# Patient Record
Sex: FEMALE | Race: WHITE | NOT HISPANIC OR LATINO | Employment: UNEMPLOYED | URBAN - METROPOLITAN AREA
[De-identification: names, ages, dates, MRNs, and addresses within clinical notes are randomized per-mention and may not be internally consistent; named-entity substitution may affect disease eponyms.]

---

## 2017-09-25 ENCOUNTER — TRANSCRIBE ORDERS (OUTPATIENT)
Dept: ADMINISTRATIVE | Facility: HOSPITAL | Age: 47
End: 2017-09-25

## 2017-09-25 DIAGNOSIS — Z12.31 ENCOUNTER FOR MAMMOGRAM TO ESTABLISH BASELINE MAMMOGRAM: Primary | ICD-10-CM

## 2017-10-03 ENCOUNTER — ALLSCRIPTS OFFICE VISIT (OUTPATIENT)
Dept: OTHER | Facility: OTHER | Age: 47
End: 2017-10-03

## 2017-10-03 DIAGNOSIS — Z12.31 ENCOUNTER FOR SCREENING MAMMOGRAM FOR MALIGNANT NEOPLASM OF BREAST: ICD-10-CM

## 2017-10-04 NOTE — PROGRESS NOTES
Assessment  1  Encounter for routine gynecological examination () (Z01 419)    Plan  Encounter for screening mammogram for malignant neoplasm of breast    · MAMMO SCREENING BILATERAL W 3D & CAD; Status:Hold For - Scheduling; Requested  OZX:[id number];    Perform:[facility] Radiology; VJF:[id number]; Ordered;For:Encounter for screening mammogram for malignant neoplasm of breast; Ordered By:Triny Flores;    Discussion/Summary  Pap test was done today Breast cancer screening: mammogram has been ordered  Refill OC's  On this for PMS  Working well  Chief Complaint  Patient presents today for a yearly exam      History of Present Illness  GYN HM, Adult Female [facility]: The patient is being seen for a gynecology evaluation  The last health maintenance visit was 1 year(s) ago  General Health:   Reproductive health: the patient is premenopausal    Screening:      Review of Systems    Constitutional: No fever, no chills, feels well, no tiredness, no recent weight gain or loss  Breasts: no complaints of breast pain, breast lump or nipple discharge  Gastrointestinal: no complaints of abdominal pain, no constipation, no nausea or diarrhea, no vomiting, no bloody stools  Genitourinary: no complaints of dysuria, no incontinence, no pelvic pain, no dysmenorrhea, no vaginal discharge or abnormal vaginal bleeding  Active Problems  1  Abdominal swelling (789 30) (R19 00)   2  Breast pain (611 71) (N64 4)   3  Encounter for routine gynecological examination ( 31) (Z01 419)   4  Encounter for routine gynecological examination with Papanicolaou smear of cervix   ( 31,V76 2) (Z01 419)   5  Encounter for screening mammogram for malignant neoplasm of breast ( 12)   (Z12 31)   6  Oral contraceptive prescribed (V25 01) (Z30 011)   7   Pre-menstrual syndrome (625 4) (N94 3)    Past Medical History   · History of Contraceptives (V25 02)   · History of Missed  (632) (O02 1)   · Normal delivery (650) (O48,E72  9)   · Oral contraceptive prescribed (V25 01) (Z30 011)   · History of Summary Of Previous Pregnancies  7  (Total No )   · History of Summary Of Previous Pregnancies Para 5  (Deliveries)    Surgical History   · History of Excision Of Urethral Prolapse   · History of Gallbladder Surgery    Family History  Family History    · Family history of Diabetes Mellitus (V18 0)   · Family history of Heart Disease (V17 49)    Social History   · Being A Social Drinker   · Marital History - Currently    · Never A Smoker    Current Meds   1  ClonazePAM 0 5 MG Oral Tablet; Therapy: 31EOU2129 to (Evaluate:2014) Recorded   2  Microgestin FE  1-20 MG-MCG Oral Tablet; take one tablet by mouth daily as   directed; Therapy: 74WFT3913 to (Evaluate:2017)  Requested for: 26Zut7798; Last   Rx:[id number] Ordered   3  PreviDent 5000 Sensitive 1 1-5 % Dental Paste; Therapy: 90QOH6051 to (Evaluate:2014) Recorded    Allergies  1  No Known Drug Allergies    Vitals   Recorded: 48YHY6824 73:84MV   Systolic 315   Diastolic 80   Height 5 ft 4 96 in   Weight 180 lb 8 oz   BMI Calculated 30 07   BSA Calculated 1 89   LMP 83Lwx3341     Physical Exam    Constitutional   General appearance: No acute distress, well appearing and well nourished  Neck   Neck: Normal, supple, trachea midline, no masses  Thyroid: Normal, no thyromegaly  Pulmonary   Respiratory effort: No increased work of breathing or signs of respiratory distress  Auscultation of lungs: Clear to auscultation  Cardiovascular   Auscultation of heart: Normal rate and rhythm, normal S1 and S2, no murmurs  Peripheral vascular exam: Normal pulses Throughout  Genitourinary   External genitalia: Normal and no lesions appreciated  Vagina: Normal, no lesions or dryness appreciated  Urethra: Normal     Urethral meatus: Normal     Bladder: Normal, soft, non-tender and no prolapse or masses appreciated      Cervix: Normal, no palpable masses  Uterus: Normal, non-tender, not enlarged, and no palpable masses  Adnexa/parametria: Normal, non-tender and no fullness or masses appreciated  Chest   Breasts: Normal and no dimpling or skin changes noted  Abdomen   Abdomen: Normal, non-tender, and no organomegaly noted  Liver and spleen: No hepatomegaly or splenomegaly  Examination for hernias: No hernias appreciated  Lymphatic   Palpation of lymph nodes in neck, axillae, groin and/or other locations: No lymphadenopathy or masses noted      Psychiatric   Orientation to person, place, and time: Normal     Mood and affect: Normal        Provider Comments  Provider Comments:   KRISSY mccoy julian PHSother children        Signatures   Electronically signed by : Gigi Ernandez DO; Oct  3 2017  9:05AM EST                       (Author)

## 2017-10-05 ENCOUNTER — LAB CONVERSION - ENCOUNTER (OUTPATIENT)
Dept: OTHER | Facility: OTHER | Age: 47
End: 2017-10-05

## 2017-10-05 LAB
ADDITIONAL INFORMATION (HISTORICAL): NORMAL
ADEQUACY: (HISTORICAL): NORMAL
COMMENT (HISTORICAL): NORMAL
CYTOTECHNOLOGIST: (HISTORICAL): NORMAL
INTERPRETATION (HISTORICAL): NORMAL
LMP (HISTORICAL): NORMAL
PREV. BX: (HISTORICAL): NORMAL
PREV. PAP (HISTORICAL): NORMAL
SOURCE (HISTORICAL): NORMAL

## 2017-10-24 ENCOUNTER — HOSPITAL ENCOUNTER (OUTPATIENT)
Dept: MAMMOGRAPHY | Facility: MEDICAL CENTER | Age: 47
Discharge: HOME | End: 2017-10-24
Payer: COMMERCIAL

## 2017-10-24 DIAGNOSIS — Z12.31 ENCOUNTER FOR SCREENING MAMMOGRAM FOR MALIGNANT NEOPLASM OF BREAST: ICD-10-CM

## 2017-10-24 PROCEDURE — G0202 SCR MAMMO BI INCL CAD: HCPCS

## 2017-10-24 PROCEDURE — 77063 BREAST TOMOSYNTHESIS BI: CPT

## 2018-01-14 VITALS
SYSTOLIC BLOOD PRESSURE: 120 MMHG | HEIGHT: 65 IN | WEIGHT: 180.5 LBS | BODY MASS INDEX: 30.07 KG/M2 | DIASTOLIC BLOOD PRESSURE: 80 MMHG

## 2018-01-18 ENCOUNTER — ALLSCRIPTS OFFICE VISIT (OUTPATIENT)
Dept: OTHER | Facility: OTHER | Age: 48
End: 2018-01-18

## 2018-01-19 NOTE — PROGRESS NOTES
Assessment   1  Bacterial vaginosis (616 10,041 9) (N76 0,B96 89)   2  Menorrhagia (626 2) (N92 0)    Plan   Bacterial vaginosis    · Clindamycin Phosphate 2 % Vaginal Cream (Cleocin); INSERT 1 APPLICATORFUL    INTRAVAGINALLY AT BEDTIMEx 7NIGHTS   Rx By: Ha Head; Dispense: 7 Days ; #:1 X 40 GM Tube; Refill: 1;For: Bacterial vaginosis; JUDI = N; Verified Transmission to [address] [place] [address]; Last Updated By: System, SureScript[employer]; 1/18/2018 4:10:22 PM  Menorrhagia    · Premarin 1 25 MG Oral Tablet; Take one tablet daily day 1-10   Rx By: Ha Head; Dispense: 0 Days ; #:10 Tablet; Refill: 0;For: Menorrhagia; JUDI = N; Verified Transmission to [address] [place] [address]; Last Updated By: System, [employer]Script[employer]; 1/18/2018 4:10:20 PM    Discussion/Summary   Discussion Summary:    Add premarin 1 25 to 1st 10 days next pack of pills; RTO TVS-SIS poss bx for possible BV  Chief Complaint   Chief Complaint Free Text Note Form: heavy menses and possible BV      History of Present Illness   HPI: Over this past week having heavy bleeding with poassage of large clots  Is on microgestin but missed 1 pill  Bleeding significantly slowed down today  Due to start next pack in 3 days  hx recurrent BV and believes she has another infection now--vaginal burning, discomfort      Active Problems   1  Abdominal swelling (789 30) (R19 00)   2  Bacterial vaginosis (616 10,041 9) (N76 0,B96 89)   3  Breast pain (611 71) (N64 4)   4  Encounter for routine gynecological examination (V72 31) (Z01 419)   5  Encounter for routine gynecological examination with Papanicolaou smear of cervix     (V72 31,V76 2) (Z01 419)   6  Encounter for screening mammogram for malignant neoplasm of breast (V76 12)     (Z12 31)   7  Menorrhagia (626 2) (N92 0)   8  Oral contraceptive prescribed (V25 01) (Z30 011)   9  Pre-menstrual syndrome (625 4) (N94 3)    Past Medical History   1  History of Contraceptives (V25 02)   2   History of Missed  (69 849 69 22) (O02 1)   3  Normal delivery (650) (O80,Z37 9)   4  Oral contraceptive prescribed (V25 01) (Z30 011)   5  History of Summary Of Previous Pregnancies  7  (Total No )   6  History of Summary Of Previous Pregnancies Para 5  (Deliveries)    Surgical History   1  History of Excision Of Urethral Prolapse   2  History of Gallbladder Surgery    Family History   Family History    1  Family history of Diabetes Mellitus (V18 0)   2  Family history of Heart Disease (V17 49)    Social History    · Being A Social Drinker   · Marital History - Currently    · Never A Smoker    Current Meds    1  ClonazePAM 0 5 MG Oral Tablet; Therapy: 19HHJ7533 to (Evaluate:2014) Recorded   2  Microgestin FE  1-20 MG-MCG Oral Tablet; take one tablet by mouth daily as     directed; Therapy: 75LNC4482 to (Evaluate:2018)  Requested for: 31CBS0973; Last     Rx:2017 Ordered   3  PreviDent 5000 Sensitive 1 1-5 % Dental Paste; Therapy: 11AFF0184 to (Evaluate:2014) Recorded    Allergies   1  No Known Drug Allergies    Vitals   Vital Signs    Recorded: 33IPJ1299 74:00JP   Systolic 621, LUE, Sitting   Diastolic 74, LUE, Sitting   Height 5 ft    Weight 182 lb 8 oz   BMI Calculated 35 64   BSA Calculated 1 8   LMP 25UZJ9866     Physical Exam        Genitourinary      External genitalia: Normal and no lesions appreciated  Vagina: Normal, no lesions or dryness appreciated  Urethra: Normal        Urethral meatus: Normal        Bladder: Normal, soft, non-tender and no prolapse or masses appreciated  Cervix: Normal, no palpable masses  Uterus: Normal, non-tender, not enlarged, and no palpable masses  Adnexa/parametria: Normal, non-tender and no fullness or masses appreciated  Abdomen      Abdomen: Normal, non-tender, and no organomegaly noted  Liver and spleen: No hepatomegaly or splenomegaly  Examination for hernias: No hernias appreciated  Signatures    Electronically signed by : Beni Strickland DO; Jan 18 2018  4:19PM EST                       (Author)

## 2018-01-22 VITALS
BODY MASS INDEX: 35.83 KG/M2 | HEIGHT: 60 IN | WEIGHT: 182.5 LBS | DIASTOLIC BLOOD PRESSURE: 74 MMHG | SYSTOLIC BLOOD PRESSURE: 124 MMHG

## 2018-01-24 ENCOUNTER — ROUTINE PRENATAL (OUTPATIENT)
Dept: GYNECOLOGY | Facility: CLINIC | Age: 48
End: 2018-01-24
Payer: COMMERCIAL

## 2018-01-24 ENCOUNTER — OFFICE VISIT (OUTPATIENT)
Dept: GYNECOLOGY | Facility: CLINIC | Age: 48
End: 2018-01-24
Payer: COMMERCIAL

## 2018-01-24 DIAGNOSIS — N92.4 PERIMENOPAUSAL MENORRHAGIA: Primary | ICD-10-CM

## 2018-01-24 DIAGNOSIS — N92.0 MENORRHAGIA WITH REGULAR CYCLE: ICD-10-CM

## 2018-01-24 PROCEDURE — 58100 BIOPSY OF UTERUS LINING: CPT | Performed by: OBSTETRICS & GYNECOLOGY

## 2018-01-24 PROCEDURE — 58340 CATHETER FOR HYSTEROGRAPHY: CPT | Performed by: OBSTETRICS & GYNECOLOGY

## 2018-01-24 PROCEDURE — 99212 OFFICE O/P EST SF 10 MIN: CPT | Performed by: OBSTETRICS & GYNECOLOGY

## 2018-01-24 PROCEDURE — 76831 ECHO EXAM UTERUS: CPT | Performed by: OBSTETRICS & GYNECOLOGY

## 2018-01-24 PROCEDURE — 88305 TISSUE EXAM BY PATHOLOGIST: CPT | Performed by: OBSTETRICS & GYNECOLOGY

## 2018-01-24 PROCEDURE — 76830 TRANSVAGINAL US NON-OB: CPT | Performed by: OBSTETRICS & GYNECOLOGY

## 2018-01-24 PROCEDURE — 88305 TISSUE EXAM BY PATHOLOGIST: CPT | Performed by: PATHOLOGY

## 2018-01-24 NOTE — PROGRESS NOTES
AMB US Pelvic Non OB  Date/Time: 1/24/2018 2:42 PM  Performed by: Tam Bravo  Authorized by: Ashkan Stuart     Procedure details:     Indications: non-obstetric vaginal bleeding      Technique:  Transvaginal US, Non-OB    Position: lithotomy exam    Uterine findings:     Diameter (mm):  42    Length (mm):  81    Width (mm):  59    Endometrial stripe: identified      Endometrium thickness (mm):  53  Left ovary findings:     Left ovary:  Visualized    Diameter (mm):  12 5    Length (mm):  27 6    Width (mm):  16 3  Right ovary findings:     Right ovary:  Visualized    Diameter (mm):  14 1    Length (mm):  27 3    Width (mm):  17 3  Other findings:     Free pelvic fluid: not identified      Free peritoneal fluid: not identified    Post-Procedure Details:     Impression:  Anteverted uterus is inhomogeneous throughout without distinct fibroids  Bilateral ovaries appear within normal limits  Sonohysterogram  Date/Time: 1/24/2018 3:19 PM  Performed by: Humberto Barrera by: Ashkan Stuart     Consent:     Consent obtained:  Written    Consent given by:  Patient    Patient questions answered: yes      Patient agrees, verbalizes understanding, and wants to proceed: yes    Pre-procedure:     Prepped with: Betadine    Procedure:     Cervix cleaned and prepped: yes      Tenaculum applied to cervix: yes    Post-procedure:     Post procedure instructions given to patient: yes      Patient tolerated procedure well with no complications: yes    Comments:      Sonohysterogram demonstrates a smooth appearing endometrium  Endometrial biopsy  Date/Time: 1/24/2018 3:20 PM  Performed by: Humberto Barrera by: Ashkan Stuart     Consent:     Consent obtained:  Verbal    Consent given by:  Patient    Patient questions answered: yes      Patient agrees, verbalizes understanding, and wants to proceed: yes    Indication:     Indications:  Other disorder of menstruation and other abnormal bleeding from female genital tract    Procedure:     Procedure: endometrial biopsy with Pipelle

## 2018-01-24 NOTE — PROGRESS NOTES
Endometrium 5 3 mm therefore biopsy taken  SIS revealed no intracavitary polyps or fibroids  Ovaries WNL

## 2018-01-24 NOTE — PATIENT INSTRUCTIONS
If menorrhagia persists options dsicussed including following, change dose of OC's, alternative medical mgt vs endometrial ablation

## 2018-02-01 DIAGNOSIS — N93.9 ABNORMAL BLEEDING IN MENSTRUAL CYCLE: Primary | ICD-10-CM

## 2018-02-01 RX ORDER — CLONAZEPAM 0.5 MG/1
0.5 TABLET ORAL DAILY
COMMUNITY
Start: 2017-12-07 | End: 2019-10-15

## 2018-02-01 RX ORDER — CLONAZEPAM 0.5 MG/1
TABLET ORAL
Refills: 4 | COMMUNITY
Start: 2018-01-16 | End: 2020-11-24 | Stop reason: ALTCHOICE

## 2018-02-01 RX ORDER — CLONAZEPAM 0.5 MG/1
TABLET ORAL
COMMUNITY
Start: 2014-03-06 | End: 2019-10-15

## 2018-02-05 DIAGNOSIS — N93.9 ABNORMAL BLEEDING IN MENSTRUAL CYCLE: ICD-10-CM

## 2018-06-12 DIAGNOSIS — N76.0 BACTERIAL VAGINOSIS: Primary | ICD-10-CM

## 2018-06-12 DIAGNOSIS — B96.89 BACTERIAL VAGINOSIS: Primary | ICD-10-CM

## 2018-06-12 RX ORDER — CLINDAMYCIN PHOSPHATE 20 MG/G
1 CREAM VAGINAL
Qty: 40 G | Refills: 0 | Status: SHIPPED | OUTPATIENT
Start: 2018-06-12 | End: 2018-06-19

## 2018-09-11 ENCOUNTER — TRANSCRIBE ORDERS (OUTPATIENT)
Dept: ADMINISTRATIVE | Facility: HOSPITAL | Age: 48
End: 2018-09-11

## 2018-09-11 DIAGNOSIS — Z12.39 SCREENING BREAST EXAMINATION: Primary | ICD-10-CM

## 2018-09-20 DIAGNOSIS — N93.9 ABNORMAL UTERINE BLEEDING (AUB): Primary | ICD-10-CM

## 2018-09-20 RX ORDER — CYCLOBENZAPRINE HCL 5 MG
5 TABLET ORAL 2 TIMES DAILY
COMMUNITY
Start: 2017-11-20 | End: 2019-10-15

## 2018-09-20 RX ORDER — PROMETHAZINE HYDROCHLORIDE, PHENYLEPHRINE HYDROCHLORIDE AND CODEINE PHOSPHATE 6.25; 5; 1 MG/5ML; MG/5ML; MG/5ML
SOLUTION ORAL
Refills: 0 | COMMUNITY
Start: 2018-07-01 | End: 2020-11-24 | Stop reason: ALTCHOICE

## 2018-09-20 RX ORDER — CALCIUM CARBONATE/VITAMIN D3 500-10/5ML
1 LIQUID (ML) ORAL
COMMUNITY

## 2018-09-20 RX ORDER — NORETHINDRONE ACETATE AND ETHINYL ESTRADIOL 1MG-20(21)
1 KIT ORAL DAILY
COMMUNITY
Start: 2013-11-06 | End: 2018-09-20 | Stop reason: SDUPTHER

## 2018-09-20 RX ORDER — NORETHINDRONE ACETATE AND ETHINYL ESTRADIOL 1MG-20(21)
1 KIT ORAL DAILY
Qty: 28 TABLET | Refills: 1 | Status: SHIPPED | OUTPATIENT
Start: 2018-09-20 | End: 2019-10-15

## 2018-09-20 RX ORDER — THYROID,PORK 130 MG
TABLET ORAL
Refills: 0 | COMMUNITY
Start: 2018-08-29 | End: 2020-11-24 | Stop reason: ALTCHOICE

## 2018-10-09 ENCOUNTER — ANNUAL EXAM (OUTPATIENT)
Dept: GYNECOLOGY | Facility: CLINIC | Age: 48
End: 2018-10-09
Payer: COMMERCIAL

## 2018-10-09 VITALS
BODY MASS INDEX: 26.66 KG/M2 | DIASTOLIC BLOOD PRESSURE: 70 MMHG | WEIGHT: 160 LBS | HEIGHT: 65 IN | SYSTOLIC BLOOD PRESSURE: 110 MMHG

## 2018-10-09 DIAGNOSIS — Z12.4 ENCOUNTER FOR PAPANICOLAOU SMEAR FOR CERVICAL CANCER SCREENING: ICD-10-CM

## 2018-10-09 DIAGNOSIS — R39.9 URINARY TRACT INFECTION SYMPTOMS: ICD-10-CM

## 2018-10-09 DIAGNOSIS — N81.4 UTERINE PROLAPSE: ICD-10-CM

## 2018-10-09 DIAGNOSIS — Z01.419 ENCOUNTER FOR GYNECOLOGICAL EXAMINATION WITHOUT ABNORMAL FINDING: Primary | ICD-10-CM

## 2018-10-09 LAB
BILIRUB UR QL STRIP: NEGATIVE
CLARITY UR: ABNORMAL
COLOR UR: YELLOW
GLUCOSE UR STRIP-MCNC: NEGATIVE MG/DL
HGB UR QL STRIP.AUTO: NEGATIVE
KETONES UR STRIP-MCNC: NEGATIVE MG/DL
LEUKOCYTE ESTERASE UR QL STRIP: NEGATIVE
NITRITE UR QL STRIP: NEGATIVE
PH UR STRIP.AUTO: 8.5 [PH] (ref 4.5–8)
PROT UR STRIP-MCNC: NEGATIVE MG/DL
SL AMB  POCT GLUCOSE, UA: ABNORMAL
SL AMB LEUKOCYTE ESTERASE,UA: ABNORMAL
SL AMB POCT BILIRUBIN,UA: ABNORMAL
SL AMB POCT BLOOD,UA: ABNORMAL
SL AMB POCT CLARITY,UA: CLEAR
SL AMB POCT COLOR,UA: YELLOW
SL AMB POCT KETONES,UA: ABNORMAL
SL AMB POCT NITRITE,UA: ABNORMAL
SL AMB POCT PH,UA: 9
SL AMB POCT SPECIFIC GRAVITY,UA: 1.01
SL AMB POCT URINE PROTEIN: 2
SL AMB POCT UROBILINOGEN: ABNORMAL
SP GR UR STRIP.AUTO: 1.02 (ref 1–1.03)
UROBILINOGEN UR QL STRIP.AUTO: 0.2 E.U./DL

## 2018-10-09 PROCEDURE — 81003 URINALYSIS AUTO W/O SCOPE: CPT | Performed by: OBSTETRICS & GYNECOLOGY

## 2018-10-09 PROCEDURE — G0145 SCR C/V CYTO,THINLAYER,RESCR: HCPCS | Performed by: OBSTETRICS & GYNECOLOGY

## 2018-10-09 PROCEDURE — S0612 ANNUAL GYNECOLOGICAL EXAMINA: HCPCS | Performed by: OBSTETRICS & GYNECOLOGY

## 2018-10-09 PROCEDURE — 81002 URINALYSIS NONAUTO W/O SCOPE: CPT | Performed by: OBSTETRICS & GYNECOLOGY

## 2018-10-09 NOTE — PROGRESS NOTES
Assessment/Plan:         Diagnoses and all orders for this visit:    Encounter for gynecological examination without abnormal finding    Urinary tract infection symptoms  -     POCT urine dip; RTO PST    Encounter for Papanicolaou smear for cervical cancer screening  -     Liquid-based pap, screening    Uterine prolapse      I recommended that Kevin Farley return to the office for a potassium sensitivity test   If this test is negative, I discussed the possibility this pain could be secondary to adenomyosis, possibly from her uterine prolapse  Discussed treatment options  each  Subjective:      Patient ID: Sy Lynn is a 53 y o  female  HPI   Annual exam   All presents the office today complaining of increasing suprapubic pressure and vaginal pressure lower thigh discomfort in lower back pain  She suspected she had a urinary tract infection  She saw her PCP  Urinalysis was negative  She was diagnosed with bacterial vaginosis  Patient however denies any vaginal discharge itching or burning  She states that several years ago she was diagnosed with interstitial cystitis while in [place]  She had a bladder installations done by her   She also states she had uplift procedure for uterine prolapse several years ago also [place] which also was unsuccessful  Patient has been on oral contraceptives but discontinued them last month  She was on this primarily for PMS symptoms she did have a workup in earlier this year for menorrhagia  Workup at that time was negative  The following portions of the patient's history were reviewed and updated as appropriate: allergies, current medications, past family history, past medical history, past social history, past surgical history and problem list     Review of Systems   Constitutional: Negative  Gastrointestinal: Negative  Genitourinary: Positive for pelvic pain   Negative for difficulty urinating, dyspareunia, dysuria, frequency, hematuria, urgency, vaginal bleeding, vaginal discharge and vaginal pain  Objective: All presents the office today complaining of increasing suprapubic pressure and vaginal pressure lower thigh discomfort in lower back pain  She suspected she had a urinary tract infection  She saw her PCP  Urinalysis was negative  She was diagnosed with bacterial vaginosis  Patient however denies any vaginal discharge itching or burning  She states that several years ago she was diagnosed with interstitial cystitis while in [place]  She had a bladder installations done by her   She also states she had uplift procedure for uterine prolapse several years ago also [place] which also was unsuccessful  Patient has been on oral contraceptives but discontinued them last month  She was on this primarily for PMS symptoms she did have a workup in earlier this year for menorrhagia  Workup at that time was negative  Since January menses have been regular  Has not had menses since stopping pill  Known of the aforementioned symptoms became worse with menses      /70 (BP Location: Left arm)   Ht 5' 5" (1 651 m)   Wt 72 6 kg (160 lb)   LMP 09/05/2018 (Approximate)   BMI 26 63 kg/m²          Physical Exam   Constitutional: She appears well-developed and well-nourished  Neck: Normal range of motion  Neck supple  No thyromegaly present  Cardiovascular: Normal rate, regular rhythm and normal heart sounds  Pulmonary/Chest: Effort normal and breath sounds normal  No respiratory distress  Right breast exhibits no inverted nipple, no mass, no nipple discharge, no skin change and no tenderness  Left breast exhibits no inverted nipple, no mass, no nipple discharge, no skin change and no tenderness  Breasts are symmetrical    Abdominal: Soft  Bowel sounds are normal  She exhibits no distension and no mass  There is no tenderness   Hernia confirmed negative in the right inguinal area and confirmed negative in the left inguinal area    Genitourinary: There is no rash or lesion on the right labia  There is no rash or lesion on the left labia  Uterus is not deviated, not enlarged, not fixed and not tender  Cervix exhibits no motion tenderness, no discharge and no friability  Right adnexum displays no mass, no tenderness and no fullness  Left adnexum displays no mass, no tenderness and no fullness  No erythema or bleeding in the vagina  No vaginal discharge found  Genitourinary Comments: 2nd degree prolapse   Lymphadenopathy:        Right: No inguinal adenopathy present  Left: No inguinal adenopathy present

## 2018-10-09 NOTE — PROGRESS NOTES
Assessment/Plan:    No problem-specific Assessment & Plan notes found for this encounter  Diagnoses and all orders for this visit:    Encounter for gynecological examination without abnormal finding    Urinary tract infection symptoms  -     POCT urine dip    Encounter for Papanicolaou smear for cervical cancer screening  -     Liquid-based pap, screening        Subjective:      Patient ID: Carolyn Mejia is a 53 y o  female      HPI    The following portions of the patient's history were reviewed and updated as appropriate: allergies, current medications, past family history, past medical history, past social history, past surgical history and problem list     Review of Systems      Objective:      /70 (BP Location: Left arm)   Ht 5' 5" (1 651 m)   Wt 72 6 kg (160 lb)   LMP 09/05/2018 (Approximate)   BMI 26 63 kg/m²          Physical Exam

## 2018-10-11 ENCOUNTER — OFFICE VISIT (OUTPATIENT)
Dept: GYNECOLOGY | Facility: CLINIC | Age: 48
End: 2018-10-11
Payer: COMMERCIAL

## 2018-10-11 VITALS
DIASTOLIC BLOOD PRESSURE: 70 MMHG | BODY MASS INDEX: 26.66 KG/M2 | WEIGHT: 160 LBS | SYSTOLIC BLOOD PRESSURE: 110 MMHG | HEIGHT: 65 IN

## 2018-10-11 DIAGNOSIS — N30.10 INTERSTITIAL CYSTITIS: Primary | ICD-10-CM

## 2018-10-11 PROCEDURE — 81002 URINALYSIS NONAUTO W/O SCOPE: CPT | Performed by: OBSTETRICS & GYNECOLOGY

## 2018-10-11 PROCEDURE — 51700 IRRIGATION OF BLADDER: CPT | Performed by: OBSTETRICS & GYNECOLOGY

## 2018-10-11 NOTE — PROGRESS NOTES
Patient presents today for potassium sensitivity test to rule out interstitial cystitis  The urethral orifice was prepped with Betadine  A catheter was inserted  Urinalysis was obtained and was negative  60 cc of sterile water was then instilled into the bladder with no over sedation of pain  This fluid was allowed to drain from the bladder  A solution of potassium was then instilled into the bladder with a noticeable increase in pelvic discomfort  This fluid was drained  A cocktail solution of DMSO, heparin, lidocaine, and Solu-Cortef was then instilled into the bladder    Impression interstitial cystitis    Plan:  Dietary a handout was given  We discussed option of MRI on 100 mg t i d  For 6 months or return to the office every 2 weeks for bladder instillation for 6 treatments patient is opted to return to the office for the bladder installations

## 2018-10-12 LAB
LAB AP GYN PRIMARY INTERPRETATION: NORMAL
Lab: NORMAL

## 2018-10-18 ENCOUNTER — HOSPITAL ENCOUNTER (OUTPATIENT)
Dept: MAMMOGRAPHY | Facility: MEDICAL CENTER | Age: 48
Discharge: HOME | End: 2018-10-18
Payer: COMMERCIAL

## 2018-10-18 DIAGNOSIS — Z12.39 SCREENING BREAST EXAMINATION: ICD-10-CM

## 2018-10-18 PROCEDURE — 77063 BREAST TOMOSYNTHESIS BI: CPT

## 2018-10-18 PROCEDURE — 77067 SCR MAMMO BI INCL CAD: CPT

## 2019-02-12 DIAGNOSIS — N91.2 AMENORRHEA: Primary | ICD-10-CM

## 2019-02-12 RX ORDER — MEDROXYPROGESTERONE ACETATE 10 MG/1
10 TABLET ORAL DAILY
Qty: 10 TABLET | Refills: 0 | Status: SHIPPED | OUTPATIENT
Start: 2019-02-12 | End: 2020-11-24 | Stop reason: ALTCHOICE

## 2019-04-02 ENCOUNTER — OFFICE VISIT (OUTPATIENT)
Dept: GYNECOLOGY | Facility: CLINIC | Age: 49
End: 2019-04-02
Payer: COMMERCIAL

## 2019-04-02 VITALS
DIASTOLIC BLOOD PRESSURE: 78 MMHG | HEIGHT: 65 IN | BODY MASS INDEX: 26.66 KG/M2 | WEIGHT: 160 LBS | SYSTOLIC BLOOD PRESSURE: 130 MMHG

## 2019-04-02 DIAGNOSIS — N64.4 BREAST PAIN: Primary | ICD-10-CM

## 2019-04-02 PROCEDURE — 99213 OFFICE O/P EST LOW 20 MIN: CPT | Performed by: OBSTETRICS & GYNECOLOGY

## 2019-04-03 ENCOUNTER — HOSPITAL ENCOUNTER (OUTPATIENT)
Dept: MAMMOGRAPHY | Facility: CLINIC | Age: 49
Discharge: HOME | End: 2019-04-03
Payer: COMMERCIAL

## 2019-04-03 ENCOUNTER — HOSPITAL ENCOUNTER (OUTPATIENT)
Dept: ULTRASOUND IMAGING | Facility: CLINIC | Age: 49
Discharge: HOME | End: 2019-04-03

## 2019-04-03 VITALS — HEIGHT: 66 IN | WEIGHT: 160 LBS | BODY MASS INDEX: 25.71 KG/M2

## 2019-04-03 DIAGNOSIS — N64.4 BREAST PAIN: ICD-10-CM

## 2019-04-03 PROCEDURE — 76642 ULTRASOUND BREAST LIMITED: CPT

## 2019-04-03 PROCEDURE — G0279 TOMOSYNTHESIS, MAMMO: HCPCS

## 2019-04-03 PROCEDURE — 77066 DX MAMMO INCL CAD BI: CPT

## 2019-10-15 ENCOUNTER — ANNUAL EXAM (OUTPATIENT)
Dept: GYNECOLOGY | Facility: CLINIC | Age: 49
End: 2019-10-15
Payer: COMMERCIAL

## 2019-10-15 VITALS
SYSTOLIC BLOOD PRESSURE: 132 MMHG | BODY MASS INDEX: 27.49 KG/M2 | HEIGHT: 65 IN | DIASTOLIC BLOOD PRESSURE: 76 MMHG | WEIGHT: 165 LBS

## 2019-10-15 DIAGNOSIS — Z01.419 ENCOUNTER FOR GYNECOLOGICAL EXAMINATION WITHOUT ABNORMAL FINDING: Primary | ICD-10-CM

## 2019-10-15 DIAGNOSIS — Z01.419 ENCOUNTER FOR GYNECOLOGICAL EXAMINATION WITH PAPANICOLAOU SMEAR OF CERVIX: ICD-10-CM

## 2019-10-15 DIAGNOSIS — N95.1 PERIMENOPAUSE: ICD-10-CM

## 2019-10-15 PROCEDURE — S0612 ANNUAL GYNECOLOGICAL EXAMINA: HCPCS | Performed by: OBSTETRICS & GYNECOLOGY

## 2019-10-15 PROCEDURE — G0145 SCR C/V CYTO,THINLAYER,RESCR: HCPCS | Performed by: OBSTETRICS & GYNECOLOGY

## 2019-10-15 NOTE — PROGRESS NOTES
Assessment/Plan:    Discussed hormonal treatment for symptomatic perimenopause/menopause  She will consider options  If she discharge try hormones will start her on Lo Loestrin  Diagnoses and all orders for this visit:    Encounter for gynecological examination without abnormal finding    Encounter for gynecological examination with Papanicolaou smear of cervix  -     Liquid-based pap, screening    Perimenopause        Subjective:      Patient ID: Gayathri King is a 51 y o  female  HPI   patient presents to office for annual examination  Her menses are irregular  She occasionally go to 2 months without a  She is experiencing hot flashes and insomnia  Denies any urinary complaints  Patient does have a history of interstitial cystitis  She also has had a prior uplift procedure in [place] for uterine prolapse    The following portions of the patient's history were reviewed and updated as appropriate:   She  has a past medical history of Contraceptive use and Missed ab  She There are no active problems to display for this patient  She  has a past surgical history that includes Urethral prolapse excision and Gallbladder surgery  Her family history includes Heart attack in her father and sister; Heart disease in her mother; Lymphoma in her sister  She  reports that she has never smoked  She has never used smokeless tobacco  She reports that she drinks alcohol  She reports that she does not use drugs    Current Outpatient Medications   Medication Sig Dispense Refill    Cholecalciferol 2000 units TABS Take 1 tablet by mouth      clonazePAM (KlonoPIN) 0 5 mg tablet   4    Magnesium Oxide 400 MG CAPS Apply 1 capsule topically      NATURE-THROID 130 MG tablet 130 every other day and 65 every other day  0    conjugated estrogens (PREMARIN) 1 25 mg tablet Take 1 tablet (1 25 mg total) by mouth daily (Patient not taking: Reported on 10/9/2018 ) 10 tablet 0    medroxyPROGESTERone (PROVERA) 10 mg tablet Take 1 tablet (10 mg total) by mouth daily for 10 days 10 tablet 0    Promethazine-Phenyleph-Codeine 6 25-5-10 MG/5ML SYRP TK 5 ML PO BID  0     No current facility-administered medications for this visit  Current Outpatient Medications on File Prior to Visit   Medication Sig    Cholecalciferol 2000 units TABS Take 1 tablet by mouth    clonazePAM (KlonoPIN) 0 5 mg tablet     Magnesium Oxide 400 MG CAPS Apply 1 capsule topically    NATURE-THROID 130 MG tablet 130 every other day and 65 every other day    conjugated estrogens (PREMARIN) 1 25 mg tablet Take 1 tablet (1 25 mg total) by mouth daily (Patient not taking: Reported on 10/9/2018 )    medroxyPROGESTERone (PROVERA) 10 mg tablet Take 1 tablet (10 mg total) by mouth daily for 10 days    Promethazine-Phenyleph-Codeine 6 25-5-10 MG/5ML SYRP TK 5 ML PO BID    [DISCONTINUED] clonazePAM (KlonoPIN) 0 5 mg tablet Take 0 5 mg by mouth daily    [DISCONTINUED] clonazePAM (KlonoPIN) 0 5 mg tablet Take by mouth    [DISCONTINUED] cyclobenzaprine (FLEXERIL) 5 mg tablet Take 5 mg by mouth 2 (two) times a day    [DISCONTINUED] Linaclotide 145 MCG CAPS Take 145 mcg by mouth    [DISCONTINUED] norethindrone-ethinyl estradiol (MICROGESTIN FE 1/20) 1-20 MG-MCG per tablet Take 1 tablet by mouth daily (Patient not taking: Reported on 10/9/2018 )     No current facility-administered medications on file prior to visit  She has No Known Allergies       Review of Systems   Constitutional: Negative  HENT: Negative for sore throat and trouble swallowing  Gastrointestinal: Negative  Genitourinary: Negative  Objective:      /76 (BP Location: Left arm)   Ht 5' 5" (1 651 m)   Wt 74 8 kg (165 lb)   LMP 09/08/2019 (Exact Date)   BMI 27 46 kg/m²          Physical Exam   Constitutional: She appears well-developed and well-nourished  Neck: Normal range of motion  Neck supple  No thyromegaly present     Cardiovascular: Normal rate, regular rhythm and normal heart sounds  Pulmonary/Chest: Effort normal and breath sounds normal  No respiratory distress  Right breast exhibits no inverted nipple, no mass, no nipple discharge, no skin change and no tenderness  Left breast exhibits no inverted nipple, no mass, no nipple discharge, no skin change and no tenderness  Abdominal: Soft  Bowel sounds are normal  She exhibits no distension and no mass  There is no tenderness  There is no rebound and no guarding  Hernia confirmed negative in the right inguinal area and confirmed negative in the left inguinal area  Genitourinary: There is no rash, tenderness or lesion on the right labia  There is no rash, tenderness or lesion on the left labia  Uterus is not deviated, not enlarged, not fixed and not tender  Cervix exhibits no motion tenderness, no discharge and no friability  Right adnexum displays no mass, no tenderness and no fullness  Left adnexum displays no mass, no tenderness and no fullness  No erythema, tenderness or bleeding in the vagina  No vaginal discharge found  Lymphadenopathy:     She has no cervical adenopathy  No inguinal adenopathy noted on the right or left side

## 2019-10-20 LAB
LAB AP GYN PRIMARY INTERPRETATION: NORMAL
Lab: NORMAL

## 2019-11-05 ENCOUNTER — DOCUMENTATION (OUTPATIENT)
Dept: GYNECOLOGY | Facility: CLINIC | Age: 49
End: 2019-11-05

## 2019-11-05 DIAGNOSIS — N95.1 PERIMENOPAUSE: Primary | ICD-10-CM

## 2019-11-05 NOTE — PROGRESS NOTES
Pt called she is really having severe hot flashes 25-30 a day  She did not get her period yet it has been 2 5 months so she did not start OCP yet   Spoke to Dr John Mcrae tell her to start taking the pills   RX and pt notified

## 2020-02-19 DIAGNOSIS — R10.2 PELVIC PRESSURE IN FEMALE: Primary | ICD-10-CM

## 2020-02-19 DIAGNOSIS — Z12.31 ENCOUNTER FOR SCREENING MAMMOGRAM FOR MALIGNANT NEOPLASM OF BREAST: Primary | ICD-10-CM

## 2020-03-17 ENCOUNTER — EVALUATION (OUTPATIENT)
Dept: PHYSICAL THERAPY | Facility: CLINIC | Age: 50
End: 2020-03-17
Payer: COMMERCIAL

## 2020-03-17 DIAGNOSIS — M79.10 MUSCLE PAIN: Primary | ICD-10-CM

## 2020-03-17 DIAGNOSIS — N94.10 DYSPAREUNIA, FEMALE: ICD-10-CM

## 2020-03-17 DIAGNOSIS — R10.2 PELVIC PRESSURE IN FEMALE: ICD-10-CM

## 2020-03-17 PROCEDURE — 97140 MANUAL THERAPY 1/> REGIONS: CPT

## 2020-03-17 PROCEDURE — 97163 PT EVAL HIGH COMPLEX 45 MIN: CPT

## 2020-03-17 PROCEDURE — 97112 NEUROMUSCULAR REEDUCATION: CPT

## 2020-03-17 NOTE — PROGRESS NOTES
PT Evaluation     Today's date: 3/17/2020  Patient name: Sheridan Jolly  : 1970  MRN: [id number]  Referring provider: Kane Collazo DO  Dx:   Encounter Diagnosis     ICD-10-CM    1  Muscle pain M79 10    2  Pelvic pressure in female R10 2    3  Dyspareunia, female N94 10        Start Time: 56  Stop Time: 1135  Total time in clinic (min): 65 minutes    Assessment  Assessment details: Patient is a 52 yo female presenting to physical therapy with symptoms consistent with pelvic pain during a well women exam and pelvic pressure that started after her 5th vaginal delivery in   Patient notes a hx of interstitial cystitis, prolapse and Hashimotos  Patient presents with decreased pelvic floor and hip strength, decreased pelvic floor endurance, increased frequency with voiding, poor posture and body mechanics during toileting  Patient has increased pelvic pain and pressure with well women exam with impedes on daily activities  Patient provided verbal and written consent on performing internal and external examination  PT will address the noted impairments by performing hip, core and pelvic floor strengthening, stretching, posture training, biofeedback, functional activities and manual techniques to allow the patient to return to her PLOF  PT recommended 1x/week for 6-8 weeks c a good prognosis 2* PLOF  Impairments: abnormal muscle firing, abnormal or restricted ROM, activity intolerance, impaired physical strength, lacks appropriate home exercise program, pain with function, poor posture  and poor body mechanics    Symptom irritability: highUnderstanding of Dx/Px/POC: good   Prognosis: good    Goals  STG: In four weeks the patient will:    1  Be (I) with her HEP  2  Increase hip and core strength to 4+/5 MMT score to assist c functional activities  3  Complete daily voiding and bowel log  4  Perform x10 diaphragmatic breathes without cues    5  Perform x 10 Kegels with a 4-5 second hold with proper breathing and relaxation of the pelvic floor muscles  LTG: In six weeks, the patient will:    1  Perform x10 TA contractions with proper activation and no cues provided by PT  2   Void without straining in the proper body mechanics  3  tolerating speculum for well woman exam with minimal to no pain post    4  Increase hip and core strength to 5/5 MMT score to assist c prolonged activities  Plan  Patient would benefit from: skilled physical therapy and women's health eval  Referral necessary: No  Planned modality interventions: cryotherapy, thermotherapy: hydrocollator packs and biofeedback  Planned therapy interventions: abdominal trunk stabilization, joint mobilization, manual therapy, massage, Jack taping, neuromuscular re-education, patient education, postural training, strengthening, stretching, therapeutic activities, therapeutic exercise, transfer training, home exercise program, functional ROM exercises, flexibility, breathing training and body mechanics training  Frequency: 1x week  Duration in visits: 8  Duration in weeks: 8  Plan of Care beginning date: 3/17/2020  Plan of Care expiration date: 5/12/2020  Treatment plan discussed with: patient        Subjective Evaluation    History of Present Illness  Mechanism of injury: Symptom description: Patient noted a hx of 5 vaginal births with the last one being in 2005  Patient noted a hx of interstitial cystitis after her last baby was born  Patient noted since then she has had increased pelvic pain with a gynecologist exam as well as intercourse  Patient noted a hx of uterine prolapse and she had a uplift procedure to decrease the pain in 2005  Patient did not have any relief  Patient noted increased straining with voiding as well as increased frequency  Patient noted she started to feel better about 10 days ago, however after having sex her pelvic pain flared    Symptoms first began in 2005  Activities that increase symptoms: well women exam and intercourse  Activities that decrease symptoms: epsinsalt bath, landis oil packs  Social activities that have altered due to condition: intercourse, worst it has been in the past 6 weeks  Diet: no gluten, no dairy, no sugar, no soy; tries to eat healthy  Fluid intake and type: 40 oz water; seltzer water    OBGYN History:    - Last exam: 10/2019    - Last period: starting menupause (2 weeks ago)    - Child birth deliveries: 5 vaginal    - Episiotomy: 2 earlier    - C-sections: no    - Difficulty childbirth: no    - [facility]     - Vaginal dryness: at times      - Menopause: started    - Painful vaginal penetration: yes at times    - Pelvic Pain: yes, post well women exam    Bladder Habits:  Number of Voids per day: 2-3 times an hour (at times)  Color of urine on avgerage: yellow to clear, depending on the amount to drink  Straining with voiding: yes  Fully Emptying: no  Leakage present: yes with coughing and sneezing  Activities with leakage: no at the moment  Pads used per day: no  UTI history: yes when younger; none in the past few years    Bowel Habits:  Number of Bowel movements per week: 1-2 times per day, depending on magnesium supplement  Potter scale average: 6-7  Constipation present: at times  Laxative use: yes  Straining during bowel movement: yes  Trouble holding back gas: no  Fully emptying: at times  Incontinence present: no    Sexual History:  Sexually active: yes  Partners: one            Recurrent probem    Quality of life: good    Pain  Current pain ratin  At best pain ratin  At worst pain ratin  Location: pelvic and lumbar spine  Quality: radiating and dull ache  Relieving factors: rest  Progression: worsening    Social Support    Employment status: not working  Hand dominance: right  Life stress: 5 children to take care of     Patient Goals  Patient goals for therapy: increased strength, independence with ADLs/IADLs, return to sport/leisure activities, increased motion and decreased pain  Patient goal: "to have less pain with well women exam "        Objective     Active Range of Motion     Lumbar   Flexion:  Restriction level: minimal  Extension:  Restriction level: minimal  Left rotation:  Restriction level: minimal  Right rotation:  Restriction level: minimal    Strength/Myotome Testing     Left Hip   Planes of Motion   Flexion: 4  Abduction: 4  Adduction: 4  External rotation: 4-  Internal rotation: 4-    Right Hip   Planes of Motion   Flexion: 4  Abduction: 4  Adduction: 4  External rotation: 4-  Internal rotation: 4-  Pelvic Floor Exam     General Perineum Exam:   perineum intact  Positive for no pelvic organ prolapse at rest and perianal erythema  Visual Inspection of Perineum:   Excursion of perineal body in cephalad direction with contraction of pelvic floor muscles (PFM): delayed , weak and unable to isolate without substitution  Excursion of perineal body in caudal direction with relaxation of pelvic floor muscles (PFM): weak and fair   Involuntary relaxation with bearing down: no  Cotton swab test: non-tender  Cough reflex: no cough reflex  Sphincter Tone Resting: normal  Sphincter Tone Squeeze: weak  Sensation: intact  Tenderness: unprovoked    Pelvic Organ Prolapse   At rest: none    Pelvic Floor Muscle Exam     Palpation   Moderate increased muscle tension in the pubococcygeus  Severe increased muscle tension in the iliococcygeus  Moderate tenderness on right in the pubococcygeus and iliococcygeus  No tenderness on left in the pubococcygeus and iliococcygeus    Muscle Contraction: substitution and weak  Breathing pattern with contraction: apical  Pelvic floor muscle relaxation is delayed  4 seconds required for complete relaxation       PERFECT Score   Power right: 2+/5  Power left: 2+/5  Endurance (seconds to max): 4  Repetitions (before fatigue): 3  Fast flicks (in 10 seconds): 7    SMEG Biofeedback Biofeedback comments: To be performed in future sessions           Flowsheet Rows      Most Recent Value   PT/OT G-Codes   Current Score  33   Projected Score  0   FOTO information reviewed  Yes   Assessment Type  Evaluation             Precautions: interstitial cystitis, hashimoto's, prolapse      Manual  3/17            Internal release MW                                                                    Exercise Diary  3/17            Open books nv            Piriformis stretch nv            Hip add and abd stretch nv            Diaphragmatic breathing nv            TA nv            TA + hip add nv            TA + hip abd nv            kegel nv            biofeedback             Posture training MW            Toilet tip and mechanics education MW            Pelvic floor model and function education MW            Voiding log education MW                                                                                                           Modalities  3/17            HP/CP PRN  np

## 2020-03-25 ENCOUNTER — OFFICE VISIT (OUTPATIENT)
Dept: PHYSICAL THERAPY | Facility: CLINIC | Age: 50
End: 2020-03-25
Payer: COMMERCIAL

## 2020-03-25 DIAGNOSIS — R10.2 PELVIC PRESSURE IN FEMALE: Primary | ICD-10-CM

## 2020-03-25 DIAGNOSIS — M79.10 MUSCLE PAIN: ICD-10-CM

## 2020-03-25 DIAGNOSIS — N94.10 DYSPAREUNIA, FEMALE: ICD-10-CM

## 2020-03-25 PROCEDURE — 97112 NEUROMUSCULAR REEDUCATION: CPT

## 2020-03-25 PROCEDURE — 97110 THERAPEUTIC EXERCISES: CPT

## 2020-03-25 PROCEDURE — 97140 MANUAL THERAPY 1/> REGIONS: CPT

## 2020-03-25 NOTE — PROGRESS NOTES
Daily Note     Today's date: 3/25/2020  Patient name: Karthik Lucio  : 1970  MRN: [id number]  Referring provider: Whitney No DO  Dx:   Encounter Diagnosis     ICD-10-CM    1  Pelvic pressure in female R10 2    2  Muscle pain M79 10    3  Dyspareunia, female N94 10        Start Time: 0830  Stop Time: 0930  Total time in clinic (min): 60 minutes    Subjective: Patient noted that she felt good for 3 days after last session, however the past few days she has had increased LBP  Patient noted she is having a hard time sleeping  Objective: See treatment diary below      Assessment: PT introduced diaphragmatic breathing, pelvic tilts and hip adduction to assist c pain levels during functional activities  Patient required moderate cues throughout the session for form  Patient provided verbal consent for internal release  PT noted less trigger points on the L 3rd layer compared to the R  Patient noted improvements post manuals  PT educated the patient about urinary frequency and mechanics to decrease her urge  Patient would benefit from continued PT to allow the patient to return to her PLOF  Plan: Continue per plan of care        Precautions: interstitial cystitis, hashimoto's, prolapse      Manual  3/17 3/25           Internal release MW ZEYAD                                                                   Exercise Diary  3/17 3/25           Open books nv 5x15" ea           Piriformis stretch nv 5x15" ea           Hip add and abd stretch nv Add butterfly stretch  3x30"           Diaphragmatic breathing nv 2x10  7 5# weight           TA nv 2x10  3" hold           TA + hip add nv 2x10  3" hold           TA + hip abd nv nv           kegel nv            biofeedback             Posture training MW            Toilet tip and mechanics education MW            Pelvic floor model and function education MW            Voiding log education MW ZEYAD Modalities  3/17            HP/CP PRN  np

## 2020-04-01 ENCOUNTER — OFFICE VISIT (OUTPATIENT)
Dept: PHYSICAL THERAPY | Facility: CLINIC | Age: 50
End: 2020-04-01
Payer: COMMERCIAL

## 2020-04-01 DIAGNOSIS — R10.2 PELVIC PRESSURE IN FEMALE: ICD-10-CM

## 2020-04-01 DIAGNOSIS — M79.10 MUSCLE PAIN: Primary | ICD-10-CM

## 2020-04-01 DIAGNOSIS — N94.10 DYSPAREUNIA, FEMALE: ICD-10-CM

## 2020-04-01 PROCEDURE — 97112 NEUROMUSCULAR REEDUCATION: CPT

## 2020-04-01 PROCEDURE — 97140 MANUAL THERAPY 1/> REGIONS: CPT

## 2020-04-08 ENCOUNTER — OFFICE VISIT (OUTPATIENT)
Dept: PHYSICAL THERAPY | Facility: CLINIC | Age: 50
End: 2020-04-08
Payer: COMMERCIAL

## 2020-04-08 DIAGNOSIS — M79.10 MUSCLE PAIN: Primary | ICD-10-CM

## 2020-04-08 DIAGNOSIS — N94.10 DYSPAREUNIA, FEMALE: ICD-10-CM

## 2020-04-08 DIAGNOSIS — R10.2 PELVIC PRESSURE IN FEMALE: ICD-10-CM

## 2020-04-08 PROCEDURE — 97110 THERAPEUTIC EXERCISES: CPT

## 2020-04-08 PROCEDURE — 97112 NEUROMUSCULAR REEDUCATION: CPT

## 2020-04-08 PROCEDURE — 97140 MANUAL THERAPY 1/> REGIONS: CPT

## 2020-04-15 ENCOUNTER — OFFICE VISIT (OUTPATIENT)
Dept: PHYSICAL THERAPY | Facility: CLINIC | Age: 50
End: 2020-04-15
Payer: COMMERCIAL

## 2020-04-15 DIAGNOSIS — R10.2 PELVIC PRESSURE IN FEMALE: ICD-10-CM

## 2020-04-15 DIAGNOSIS — N94.10 DYSPAREUNIA, FEMALE: ICD-10-CM

## 2020-04-15 DIAGNOSIS — M79.10 MUSCLE PAIN: Primary | ICD-10-CM

## 2020-04-15 PROCEDURE — 97530 THERAPEUTIC ACTIVITIES: CPT

## 2020-04-15 PROCEDURE — 97140 MANUAL THERAPY 1/> REGIONS: CPT

## 2020-04-15 PROCEDURE — 97112 NEUROMUSCULAR REEDUCATION: CPT

## 2020-04-22 ENCOUNTER — OFFICE VISIT (OUTPATIENT)
Dept: PHYSICAL THERAPY | Facility: CLINIC | Age: 50
End: 2020-04-22
Payer: COMMERCIAL

## 2020-04-22 DIAGNOSIS — M79.10 MUSCLE PAIN: Primary | ICD-10-CM

## 2020-04-22 DIAGNOSIS — N94.10 DYSPAREUNIA, FEMALE: ICD-10-CM

## 2020-04-22 DIAGNOSIS — R10.2 PELVIC PRESSURE IN FEMALE: ICD-10-CM

## 2020-04-22 PROCEDURE — 97112 NEUROMUSCULAR REEDUCATION: CPT

## 2020-04-22 PROCEDURE — 97140 MANUAL THERAPY 1/> REGIONS: CPT

## 2020-04-22 PROCEDURE — 97110 THERAPEUTIC EXERCISES: CPT

## 2020-04-29 ENCOUNTER — OFFICE VISIT (OUTPATIENT)
Dept: PHYSICAL THERAPY | Facility: CLINIC | Age: 50
End: 2020-04-29
Payer: COMMERCIAL

## 2020-04-29 DIAGNOSIS — N94.10 DYSPAREUNIA, FEMALE: ICD-10-CM

## 2020-04-29 DIAGNOSIS — M79.10 MUSCLE PAIN: Primary | ICD-10-CM

## 2020-04-29 DIAGNOSIS — R10.2 PELVIC PRESSURE IN FEMALE: ICD-10-CM

## 2020-04-29 PROCEDURE — 97110 THERAPEUTIC EXERCISES: CPT

## 2020-04-29 PROCEDURE — 97112 NEUROMUSCULAR REEDUCATION: CPT

## 2020-04-29 PROCEDURE — 97140 MANUAL THERAPY 1/> REGIONS: CPT

## 2020-05-06 ENCOUNTER — OFFICE VISIT (OUTPATIENT)
Dept: PHYSICAL THERAPY | Facility: CLINIC | Age: 50
End: 2020-05-06
Payer: COMMERCIAL

## 2020-05-06 DIAGNOSIS — N94.10 DYSPAREUNIA, FEMALE: ICD-10-CM

## 2020-05-06 DIAGNOSIS — M79.10 MUSCLE PAIN: Primary | ICD-10-CM

## 2020-05-06 DIAGNOSIS — R10.2 PELVIC PRESSURE IN FEMALE: ICD-10-CM

## 2020-05-06 PROCEDURE — 97112 NEUROMUSCULAR REEDUCATION: CPT

## 2020-05-06 PROCEDURE — 97140 MANUAL THERAPY 1/> REGIONS: CPT

## 2020-05-06 PROCEDURE — 97110 THERAPEUTIC EXERCISES: CPT

## 2020-05-13 ENCOUNTER — OFFICE VISIT (OUTPATIENT)
Dept: PHYSICAL THERAPY | Facility: CLINIC | Age: 50
End: 2020-05-13
Payer: COMMERCIAL

## 2020-05-13 DIAGNOSIS — N94.10 DYSPAREUNIA, FEMALE: ICD-10-CM

## 2020-05-13 DIAGNOSIS — R10.2 PELVIC PRESSURE IN FEMALE: ICD-10-CM

## 2020-05-13 DIAGNOSIS — M79.10 MUSCLE PAIN: Primary | ICD-10-CM

## 2020-05-13 PROCEDURE — 97140 MANUAL THERAPY 1/> REGIONS: CPT

## 2020-05-13 PROCEDURE — 97112 NEUROMUSCULAR REEDUCATION: CPT

## 2020-05-18 ENCOUNTER — OFFICE VISIT (OUTPATIENT)
Dept: PHYSICAL THERAPY | Facility: CLINIC | Age: 50
End: 2020-05-18
Payer: COMMERCIAL

## 2020-05-18 DIAGNOSIS — R10.2 PELVIC PRESSURE IN FEMALE: ICD-10-CM

## 2020-05-18 DIAGNOSIS — N94.10 DYSPAREUNIA, FEMALE: ICD-10-CM

## 2020-05-18 DIAGNOSIS — M79.10 MUSCLE PAIN: Primary | ICD-10-CM

## 2020-05-18 PROCEDURE — 97112 NEUROMUSCULAR REEDUCATION: CPT

## 2020-05-18 PROCEDURE — 97110 THERAPEUTIC EXERCISES: CPT

## 2020-05-18 PROCEDURE — 97140 MANUAL THERAPY 1/> REGIONS: CPT

## 2020-05-20 ENCOUNTER — EVALUATION (OUTPATIENT)
Dept: PHYSICAL THERAPY | Facility: CLINIC | Age: 50
End: 2020-05-20
Payer: COMMERCIAL

## 2020-05-20 DIAGNOSIS — G89.29 CHRONIC RIGHT-SIDED LOW BACK PAIN WITHOUT SCIATICA: ICD-10-CM

## 2020-05-20 DIAGNOSIS — M79.10 MUSCLE PAIN: Primary | ICD-10-CM

## 2020-05-20 DIAGNOSIS — N94.10 DYSPAREUNIA, FEMALE: ICD-10-CM

## 2020-05-20 DIAGNOSIS — M54.50 CHRONIC RIGHT-SIDED LOW BACK PAIN WITHOUT SCIATICA: ICD-10-CM

## 2020-05-20 DIAGNOSIS — R10.2 PELVIC PRESSURE IN FEMALE: ICD-10-CM

## 2020-05-20 PROCEDURE — 97140 MANUAL THERAPY 1/> REGIONS: CPT

## 2020-05-20 PROCEDURE — 97112 NEUROMUSCULAR REEDUCATION: CPT

## 2020-05-26 ENCOUNTER — OFFICE VISIT (OUTPATIENT)
Dept: PHYSICAL THERAPY | Facility: CLINIC | Age: 50
End: 2020-05-26
Payer: COMMERCIAL

## 2020-05-26 DIAGNOSIS — M79.10 MUSCLE PAIN: Primary | ICD-10-CM

## 2020-05-26 DIAGNOSIS — G89.29 CHRONIC RIGHT-SIDED LOW BACK PAIN WITHOUT SCIATICA: ICD-10-CM

## 2020-05-26 DIAGNOSIS — M54.50 CHRONIC RIGHT-SIDED LOW BACK PAIN WITHOUT SCIATICA: ICD-10-CM

## 2020-05-26 DIAGNOSIS — R10.2 PELVIC PRESSURE IN FEMALE: ICD-10-CM

## 2020-05-26 DIAGNOSIS — N94.10 DYSPAREUNIA, FEMALE: ICD-10-CM

## 2020-05-26 PROCEDURE — 97112 NEUROMUSCULAR REEDUCATION: CPT

## 2020-05-27 ENCOUNTER — APPOINTMENT (OUTPATIENT)
Dept: PHYSICAL THERAPY | Facility: CLINIC | Age: 50
End: 2020-05-27
Payer: COMMERCIAL

## 2020-05-27 ENCOUNTER — TRANSCRIBE ORDERS (OUTPATIENT)
Dept: PHYSICAL THERAPY | Facility: CLINIC | Age: 50
End: 2020-05-27

## 2020-05-27 DIAGNOSIS — M54.50 CHRONIC RIGHT-SIDED LOW BACK PAIN WITHOUT SCIATICA: ICD-10-CM

## 2020-05-27 DIAGNOSIS — R10.2 PELVIC PRESSURE IN FEMALE: ICD-10-CM

## 2020-05-27 DIAGNOSIS — M79.10 MUSCLE PAIN: Primary | ICD-10-CM

## 2020-05-27 DIAGNOSIS — G89.29 CHRONIC RIGHT-SIDED LOW BACK PAIN WITHOUT SCIATICA: ICD-10-CM

## 2020-05-27 DIAGNOSIS — N94.10 DYSPAREUNIA, FEMALE: ICD-10-CM

## 2020-05-28 ENCOUNTER — APPOINTMENT (OUTPATIENT)
Dept: PHYSICAL THERAPY | Facility: CLINIC | Age: 50
End: 2020-05-28
Payer: COMMERCIAL

## 2020-06-02 ENCOUNTER — OFFICE VISIT (OUTPATIENT)
Dept: PHYSICAL THERAPY | Facility: CLINIC | Age: 50
End: 2020-06-02
Payer: COMMERCIAL

## 2020-06-02 DIAGNOSIS — M79.10 MUSCLE PAIN: Primary | ICD-10-CM

## 2020-06-02 DIAGNOSIS — G89.29 CHRONIC RIGHT-SIDED LOW BACK PAIN WITHOUT SCIATICA: ICD-10-CM

## 2020-06-02 DIAGNOSIS — M54.50 CHRONIC RIGHT-SIDED LOW BACK PAIN WITHOUT SCIATICA: ICD-10-CM

## 2020-06-02 DIAGNOSIS — N94.10 DYSPAREUNIA, FEMALE: ICD-10-CM

## 2020-06-02 DIAGNOSIS — R10.2 PELVIC PRESSURE IN FEMALE: ICD-10-CM

## 2020-06-02 PROCEDURE — 97110 THERAPEUTIC EXERCISES: CPT

## 2020-06-02 PROCEDURE — 97140 MANUAL THERAPY 1/> REGIONS: CPT

## 2020-06-02 PROCEDURE — 97112 NEUROMUSCULAR REEDUCATION: CPT

## 2020-06-05 ENCOUNTER — HOSPITAL ENCOUNTER (OUTPATIENT)
Dept: MAMMOGRAPHY | Facility: MEDICAL CENTER | Age: 50
Discharge: HOME | End: 2020-06-05
Payer: COMMERCIAL

## 2020-06-05 VITALS — BODY MASS INDEX: 26.67 KG/M2 | HEIGHT: 65 IN | WEIGHT: 160.06 LBS

## 2020-06-05 DIAGNOSIS — Z12.31 ENCOUNTER FOR SCREENING MAMMOGRAM FOR MALIGNANT NEOPLASM OF BREAST: ICD-10-CM

## 2020-06-05 PROCEDURE — 77063 BREAST TOMOSYNTHESIS BI: CPT

## 2020-06-05 PROCEDURE — 77067 SCR MAMMO BI INCL CAD: CPT

## 2020-06-09 ENCOUNTER — OFFICE VISIT (OUTPATIENT)
Dept: PHYSICAL THERAPY | Facility: CLINIC | Age: 50
End: 2020-06-09
Payer: COMMERCIAL

## 2020-06-09 DIAGNOSIS — M79.10 MUSCLE PAIN: Primary | ICD-10-CM

## 2020-06-09 DIAGNOSIS — M54.50 CHRONIC RIGHT-SIDED LOW BACK PAIN WITHOUT SCIATICA: ICD-10-CM

## 2020-06-09 DIAGNOSIS — R10.2 PELVIC PRESSURE IN FEMALE: ICD-10-CM

## 2020-06-09 DIAGNOSIS — G89.29 CHRONIC RIGHT-SIDED LOW BACK PAIN WITHOUT SCIATICA: ICD-10-CM

## 2020-06-09 DIAGNOSIS — N94.10 DYSPAREUNIA, FEMALE: ICD-10-CM

## 2020-06-09 PROCEDURE — 97140 MANUAL THERAPY 1/> REGIONS: CPT

## 2020-06-09 PROCEDURE — 97112 NEUROMUSCULAR REEDUCATION: CPT

## 2020-06-23 ENCOUNTER — OFFICE VISIT (OUTPATIENT)
Dept: PHYSICAL THERAPY | Facility: CLINIC | Age: 50
End: 2020-06-23
Payer: COMMERCIAL

## 2020-06-23 DIAGNOSIS — R10.2 PELVIC PRESSURE IN FEMALE: ICD-10-CM

## 2020-06-23 DIAGNOSIS — M54.50 CHRONIC RIGHT-SIDED LOW BACK PAIN WITHOUT SCIATICA: ICD-10-CM

## 2020-06-23 DIAGNOSIS — M79.10 MUSCLE PAIN: Primary | ICD-10-CM

## 2020-06-23 DIAGNOSIS — N94.10 DYSPAREUNIA, FEMALE: ICD-10-CM

## 2020-06-23 DIAGNOSIS — G89.29 CHRONIC RIGHT-SIDED LOW BACK PAIN WITHOUT SCIATICA: ICD-10-CM

## 2020-06-23 PROCEDURE — 97112 NEUROMUSCULAR REEDUCATION: CPT

## 2020-06-23 PROCEDURE — 97140 MANUAL THERAPY 1/> REGIONS: CPT

## 2020-07-02 ENCOUNTER — OFFICE VISIT (OUTPATIENT)
Dept: PHYSICAL THERAPY | Facility: CLINIC | Age: 50
End: 2020-07-02
Payer: COMMERCIAL

## 2020-07-02 DIAGNOSIS — M54.50 CHRONIC RIGHT-SIDED LOW BACK PAIN WITHOUT SCIATICA: ICD-10-CM

## 2020-07-02 DIAGNOSIS — R10.2 PELVIC PRESSURE IN FEMALE: ICD-10-CM

## 2020-07-02 DIAGNOSIS — M79.10 MUSCLE PAIN: Primary | ICD-10-CM

## 2020-07-02 DIAGNOSIS — G89.29 CHRONIC RIGHT-SIDED LOW BACK PAIN WITHOUT SCIATICA: ICD-10-CM

## 2020-07-02 DIAGNOSIS — N94.10 DYSPAREUNIA, FEMALE: ICD-10-CM

## 2020-07-02 PROCEDURE — 97112 NEUROMUSCULAR REEDUCATION: CPT

## 2020-07-02 PROCEDURE — 97110 THERAPEUTIC EXERCISES: CPT

## 2020-07-02 NOTE — PROGRESS NOTES
Daily Note     Today's date: 2020  Patient name: Khushbu Lauren  : 1970  MRN: [id number]  Referring provider: Naomi Betts DO  Dx:   Encounter Diagnosis     ICD-10-CM    1  Muscle pain M79 10    2  Pelvic pressure in female R10 2    3  Dyspareunia, female N94 10    4  Chronic right-sided low back pain without sciatica M54 5     G89 29        Start Time: 1130  Stop Time: 1230  Total time in clinic (min): 60 minutes    Subjective: Patient noted she received the MRI on the lumbar spine and has the results in her hand  Patient noted her pain is around a 3/10 constantly, however when she bends forward to brush the dog she has an 8/10  Patient noted yesterday she spent about a half hour stretching and noted relief  Patient asked about getting a pelvic ultrasound  Objective: See treatment diary below      Assessment: Patient looked at the MRI result which noted in the impression mild DDD  PT educated the patient about DDD and noted a pelvic US would be recommended due to PT exercises only helping a little  PT educated the patient on starting with stabilization exercises now due to her stopping her strengthening exercises  Patient was able to activate the multifidus on the L side when compared to the R  The multifidus fatigues quickly which explains why she has pain after performing an activity for a period of time  PT changed HEP  Patient noted no pain at the end of the session  Patient would benefit from continued PT to allow the patient to return to her PLOF  Plan: Continue per plan of care        Precautions: interstitial cystitis, hashimoto's, prolapse    Manuals 7/            Internal release             Trigger point release to glute meds             Pelvic rocking             PA to lumbar spine             Neuro Re-Ed     Pelvic tilt + multifidus x15  3" hold            Posture, HEP, pelvic education MW  30'            Multifidus sidelying x10 ea  3" hold            Seated TA + multifidus x15  3" hold            kegel and glute squeeze x10  3" hold                                      Ther Ex    Piriformis stretch 5x15" ea            Butterfly stretch 3x30"                                                                                          Ther Activity                              Gait Training                              Modalities

## 2020-07-09 ENCOUNTER — OFFICE VISIT (OUTPATIENT)
Dept: PHYSICAL THERAPY | Facility: CLINIC | Age: 50
End: 2020-07-09
Payer: COMMERCIAL

## 2020-07-09 DIAGNOSIS — N94.10 DYSPAREUNIA, FEMALE: ICD-10-CM

## 2020-07-09 DIAGNOSIS — M79.10 MUSCLE PAIN: Primary | ICD-10-CM

## 2020-07-09 DIAGNOSIS — R10.2 PELVIC PRESSURE IN FEMALE: ICD-10-CM

## 2020-07-09 DIAGNOSIS — G89.29 CHRONIC RIGHT-SIDED LOW BACK PAIN WITHOUT SCIATICA: ICD-10-CM

## 2020-07-09 DIAGNOSIS — M54.50 CHRONIC RIGHT-SIDED LOW BACK PAIN WITHOUT SCIATICA: ICD-10-CM

## 2020-07-09 PROCEDURE — 97110 THERAPEUTIC EXERCISES: CPT

## 2020-07-09 PROCEDURE — 97140 MANUAL THERAPY 1/> REGIONS: CPT

## 2020-07-09 NOTE — PROGRESS NOTES
Daily Note     Today's date: 2020  Patient name: Francesco Johnson  : 1970  MRN: [id number]  Referring provider: Alexsandra Joyce DO  Dx:   Encounter Diagnosis     ICD-10-CM    1  Muscle pain M79 10    2  Pelvic pressure in female R10 2    3  Dyspareunia, female N94 10    4  Chronic right-sided low back pain without sciatica M54 5     G89 29        Start Time: 1400  Stop Time: 1500  Total time in clinic (min): 60 minutes    Subjective: Patient noted she has not heard back from her doctor about her pelvic ultrasounds  Patient noted she felt good after last time, however over the weekend she had increased back, pelvic and leg pain  Patient noted in general she wakes up feeling sore all over  Patient noted she feels constipated at times  Objective: See treatment diary below      Assessment: PT assessed hamstrings and hip flexors due to pain patterns  Patient was tight and tender at the proximal attachment of the hamstring  Patient improved with STM at the proximal hamstring and piriformis  PT introduced sciatic neve glides as well as contract/relax stretch for the hamstrings  PT also introduced hip flexor stretch 2* lumbar spine pain that is radiating to the thighs  PT performed ILU massage 2* patient feeling constipated  Patient would benefit from continued PT to allow the patient to return to her PLOF  Plan: Continue per plan of care        Precautions: interstitial cystitis, hashimoto's, prolapse    Manuals            Internal release             Trigger point release to glute meds  prox hamstring, hip flex & piriformis  MW           Pelvic rocking             ILU massage  MW           PA to lumbar spine             Neuro Re-Ed     Pelvic tilt + multifidus x15  3" hold            Posture, HEP, pelvic education MW  27' MW           Multifidus sidelying x10 ea  3" hold            Seated TA + multifidus x15  3" hold            kegel and glute squeeze x10  3" hold            Sciatic nerve glide  x16 ea                        Ther Ex    Piriformis stretch 5x15" ea            Butterfly stretch 3x30" hold           Hamstring stretch (contract/relax)  3x5 ea           Hip flexor standing stretch on chair  5x10"                                                               Ther Activity                              Gait Training                              Modalities

## 2020-07-15 ENCOUNTER — OFFICE VISIT (OUTPATIENT)
Dept: PHYSICAL THERAPY | Facility: CLINIC | Age: 50
End: 2020-07-15
Payer: COMMERCIAL

## 2020-07-15 DIAGNOSIS — M54.50 CHRONIC RIGHT-SIDED LOW BACK PAIN WITHOUT SCIATICA: ICD-10-CM

## 2020-07-15 DIAGNOSIS — G89.29 CHRONIC RIGHT-SIDED LOW BACK PAIN WITHOUT SCIATICA: ICD-10-CM

## 2020-07-15 DIAGNOSIS — N94.10 DYSPAREUNIA, FEMALE: ICD-10-CM

## 2020-07-15 DIAGNOSIS — R10.2 PELVIC PRESSURE IN FEMALE: ICD-10-CM

## 2020-07-15 DIAGNOSIS — M79.10 MUSCLE PAIN: Primary | ICD-10-CM

## 2020-07-15 PROCEDURE — 97140 MANUAL THERAPY 1/> REGIONS: CPT

## 2020-07-15 PROCEDURE — 97110 THERAPEUTIC EXERCISES: CPT

## 2020-07-15 PROCEDURE — 97112 NEUROMUSCULAR REEDUCATION: CPT

## 2020-07-15 NOTE — PROGRESS NOTES
Daily Note     Today's date: 7/15/2020  Patient name: Shawn Andres  : 1970  MRN: [id number]  Referring provider: Domenic Raymundo DO  Dx:   Encounter Diagnosis     ICD-10-CM    1  Muscle pain M79 10    2  Pelvic pressure in female R10 2    3  Dyspareunia, female N94 10    4  Chronic right-sided low back pain without sciatica M54 5     G89 29        Start Time: 830  Stop Time: 930  Total time in clinic (min): 60 minutes    Subjective: Patient noted she mowed the lawn yesterday and noted increased back pain afterwards  Patient noted her LBP is migrating into the thoracic spine  Patient noted the exercises have been helping her and have not made her pain worse  Patient noted her pelvic ultrasound is scheduled for Monday  Objective: See treatment diary below      Assessment: PT introduced prone I and Ts to assist c stabilization during mowing the lawn  Patient required moderate cues for scapular retractions, however was able to perform them in front of a mirror with proper form  PT introduced quad set and hip flexor isometric due to groin pain ever once in a while  Patient was less TTP on the R proximal hamstring compared to last session  Patient noted no pain at the end of the session  Patient would benefit from continued PT to allow the patient to return to her PLOF  Plan: Continue per plan of care        Precautions: interstitial cystitis, hashimoto's, prolapse    Manuals 7/2 7/9 7/15          Internal release             Trigger point release to glute meds  prox hamstring, hip flex & piriformis  MW MW  prox hamstring, piriformis          Pelvic rocking             ILU massage  MW           PA to lumbar spine             Neuro Re-Ed     Pelvic tilt + multifidus x15  3" hold            Posture, HEP, pelvic education MW  27' MW           Multifidus sidelying x10 ea  3" hold            Seated TA + multifidus x15  3" hold            kegel and glute squeeze x10  3" hold            Sciatic nerve glide  x16 ea           Prone I and T   x10 ea  Mod cues          Scapular retraction   2x10  5" hold          Ther Ex    Piriformis stretch 5x15" ea            Butterfly stretch 3x30" hold           Hamstring stretch (contract/relax)  3x5 ea           Hip flexor standing stretch on chair  5x10"           Hip flexor isometric + TA   x10  5" hold ea          Quad set + TA   x10  5" hold ea                                    Ther Activity                              Gait Training                              Modalities

## 2020-07-22 ENCOUNTER — APPOINTMENT (OUTPATIENT)
Dept: PHYSICAL THERAPY | Facility: CLINIC | Age: 50
End: 2020-07-22
Payer: COMMERCIAL

## 2020-07-24 ENCOUNTER — OFFICE VISIT (OUTPATIENT)
Dept: PHYSICAL THERAPY | Facility: CLINIC | Age: 50
End: 2020-07-24
Payer: COMMERCIAL

## 2020-07-24 DIAGNOSIS — M79.10 MUSCLE PAIN: Primary | ICD-10-CM

## 2020-07-24 DIAGNOSIS — G89.29 CHRONIC RIGHT-SIDED LOW BACK PAIN WITHOUT SCIATICA: ICD-10-CM

## 2020-07-24 DIAGNOSIS — N94.10 DYSPAREUNIA, FEMALE: ICD-10-CM

## 2020-07-24 DIAGNOSIS — R10.2 PELVIC PRESSURE IN FEMALE: ICD-10-CM

## 2020-07-24 DIAGNOSIS — M54.50 CHRONIC RIGHT-SIDED LOW BACK PAIN WITHOUT SCIATICA: ICD-10-CM

## 2020-07-24 PROCEDURE — 97140 MANUAL THERAPY 1/> REGIONS: CPT

## 2020-07-24 PROCEDURE — 97112 NEUROMUSCULAR REEDUCATION: CPT

## 2020-07-24 NOTE — PROGRESS NOTES
Daily Note     Today's date: 2020  Patient name: Davon Fletcher  : 1970  MRN: [id number]  Referring provider: Pawan Ramírez DO  Dx:   Encounter Diagnosis     ICD-10-CM    1  Muscle pain M79 10    2  Pelvic pressure in female R10 2    3  Dyspareunia, female N94 10    4  Chronic right-sided low back pain without sciatica M54 5     G89 29        Start Time: 830  Stop Time: 930  Total time in clinic (min): 60 minutes    Subjective: Patient noted she had the pelvic ultrasound performed with negative findings  Patient noted the back is about the same, however the pelvic floor feels like a pressure in the front  Patient noted her constipation has improved since she stopped eating as many vegetables  Objective: See treatment diary below      Assessment: PT performed internal release to assist c pain levels  The patient presented with increased trigger points in the third layer  Patient provided verbal consent for a student to be present in the room as well provided verbal consent for the internal release  Patient noted improvements post manuals  PT introduced repeated flexion -double knee to chest to assist c back pain  Patient noted improvements after performing the double knee to chest  Patient performed the trio seated on two towels  The patient required moderate VCs for diaphragmatic breathing in a sitting position  Patient would benefit from continued PT to allow the patient to return to her PLOF  Plan: Continue per plan of care        Precautions: interstitial cystitis, hashimoto's, prolapse    Manuals 7/2 7/9 7/15 7/24         Internal release    MW  Layer 3 was tight         Trigger point release to glute meds  prox hamstring, hip flex & piriformis  MW MW  prox hamstring, piriformis MW  prox hamstring, piriformis         Pelvic rocking             ILU massage  MW           PA to lumbar spine             Neuro Re-Ed     Pelvic tilt + multifidus x15  3" hold            Posture, HEP, pelvic education MW  27' MW           Multifidus sidelying x10 ea  3" hold            Seated TA + multifidus x15  3" hold            kegel and glute squeeze x10  3" hold            Sciatic nerve glide  x16 ea           Prone I and T   x10 ea  Mod cues          Repeated flexion in lying (double knee to chest with towel)    x10  improved pain levels         Trio seated on towels    2x10  Mod cues         Scapular retraction   2x10  5" hold          Ther Ex    Piriformis stretch 5x15" ea            Butterfly stretch 3x30" hold           Hamstring stretch (contract/relax)  3x5 ea           Hip flexor standing stretch on chair  5x10"           Hip flexor isometric + TA   x10  5" hold ea          Quad set + TA   x10  5" hold ea                                    Ther Activity                              Gait Training                              Modalities

## 2020-07-29 ENCOUNTER — APPOINTMENT (OUTPATIENT)
Dept: PHYSICAL THERAPY | Facility: CLINIC | Age: 50
End: 2020-07-29
Payer: COMMERCIAL

## 2020-08-05 ENCOUNTER — APPOINTMENT (OUTPATIENT)
Dept: PHYSICAL THERAPY | Facility: CLINIC | Age: 50
End: 2020-08-05
Payer: COMMERCIAL

## 2020-08-06 ENCOUNTER — OFFICE VISIT (OUTPATIENT)
Dept: PHYSICAL THERAPY | Facility: CLINIC | Age: 50
End: 2020-08-06
Payer: COMMERCIAL

## 2020-08-06 DIAGNOSIS — G89.29 CHRONIC RIGHT-SIDED LOW BACK PAIN WITHOUT SCIATICA: ICD-10-CM

## 2020-08-06 DIAGNOSIS — N94.10 DYSPAREUNIA, FEMALE: ICD-10-CM

## 2020-08-06 DIAGNOSIS — R10.2 PELVIC PRESSURE IN FEMALE: ICD-10-CM

## 2020-08-06 DIAGNOSIS — M79.10 MUSCLE PAIN: Primary | ICD-10-CM

## 2020-08-06 DIAGNOSIS — M54.50 CHRONIC RIGHT-SIDED LOW BACK PAIN WITHOUT SCIATICA: ICD-10-CM

## 2020-08-06 PROCEDURE — 97112 NEUROMUSCULAR REEDUCATION: CPT

## 2020-08-06 PROCEDURE — 97140 MANUAL THERAPY 1/> REGIONS: CPT

## 2020-08-06 NOTE — PROGRESS NOTES
Daily Note     Today's date: 2020  Patient name: Jenny Blackburn  : 1970  MRN: [id number]  Referring provider: Campbell Ahumada, DO  Dx:   Encounter Diagnosis     ICD-10-CM    1  Muscle pain  M79 10    2  Pelvic pressure in female  R10 2    3  Dyspareunia, female  N94 10    4  Chronic right-sided low back pain without sciatica  M54 5     G89 29        Start Time: 830  Stop Time: 930  Total time in clinic (min): 60 minutes    Subjective: Patient noted she feels about the same from last visit  Patient noted she is able to complete ADLs such as cleaning and mowing the lawn without pain, however at night the stiffness is so bad that she has a hard time getting out of a chair  Patient noted after the internal release she did not have pain for a day  Objective: See treatment diary below      Assessment: PT performed internal release to assist c pain levels  Patient presented with increased trigger points in the 3rd layer  Patient noted improvements post release  Patient provided verbal consent to perform internal release and biofeedback  PT introduced biofeedback to assist c proper kegel contractions and to work on relaxation of the pelvic floor muscles  Patient had a difficult time with relaxation while in supine, however seated her levels were WNL  Patient had a difficult time maureen while seated, however supine she was able to hold for 5 seconds  Patient continues to be limited in endurance in the pelvic floor musculature  Patient noted no pain post session  Patient would benefit from continued PT to allow the patient to return to her PLOF  Plan: Continue per plan of care        Precautions: interstitial cystitis, hashimoto's, prolapse    Manuals 7/2 7/9 7/15 7/24 8/6        Internal release    MW  Layer 3 was tight MW  layer 3 was tight        Trigger point release to glute meds  prox hamstring, hip flex & piriformis  MW MW  prox hamstring, piriformis MW  prox hamstring, piriformis Pelvic rocking             ILU massage  MW           PA to lumbar spine             Neuro Re-Ed     Pelvic tilt + multifidus x15  3" hold            Posture, HEP, pelvic education MW  27' MW           Multifidus sidelying x10 ea  3" hold            Seated TA + multifidus x15  3" hold            kegel and glute squeeze x10  3" hold            Sciatic nerve glide  x16 ea           Prone I and T   x10 ea  Mod cues          Repeated flexion in lying (double knee to chest with towel)    x10  improved pain levels         Trio seated on towels    2x10  Mod cues         Bio feedback: seated and supine     30'  3-5" hold  restin-7        Scapular retraction   2x10  5" hold          Ther Ex    Piriformis stretch 5x15" ea            Butterfly stretch 3x30" hold           Hamstring stretch (contract/relax)  3x5 ea           Hip flexor standing stretch on chair  5x10"           Hip flexor isometric + TA   x10  5" hold ea          Quad set + TA   x10  5" hold ea                                    Ther Activity                              Gait Training                              Modalities

## 2020-08-19 ENCOUNTER — OFFICE VISIT (OUTPATIENT)
Dept: PHYSICAL THERAPY | Facility: CLINIC | Age: 50
End: 2020-08-19
Payer: COMMERCIAL

## 2020-08-19 DIAGNOSIS — N94.10 DYSPAREUNIA, FEMALE: ICD-10-CM

## 2020-08-19 DIAGNOSIS — R10.2 PELVIC PRESSURE IN FEMALE: ICD-10-CM

## 2020-08-19 DIAGNOSIS — M79.10 MUSCLE PAIN: Primary | ICD-10-CM

## 2020-08-19 DIAGNOSIS — M54.50 CHRONIC RIGHT-SIDED LOW BACK PAIN WITHOUT SCIATICA: ICD-10-CM

## 2020-08-19 DIAGNOSIS — G89.29 CHRONIC RIGHT-SIDED LOW BACK PAIN WITHOUT SCIATICA: ICD-10-CM

## 2020-08-19 PROCEDURE — 97112 NEUROMUSCULAR REEDUCATION: CPT

## 2020-08-19 PROCEDURE — 97140 MANUAL THERAPY 1/> REGIONS: CPT

## 2020-08-19 NOTE — PROGRESS NOTES
PT Re-Evaluation     Today's date: 2020  Patient name: Khadra Gordon  : 1970  MRN: [id number]  Referring provider: Pito Alonso DO  Dx:   Encounter Diagnosis     ICD-10-CM    1  Muscle pain  M79 10    2  Pelvic pressure in female  R10 2    3  Dyspareunia, female  N94 10    4  Chronic right-sided low back pain without sciatica  M54 5     G89 29        Start Time: 830  Stop Time: 930  Total time in clinic (min): 60 minutes    Assessment  Assessment details: Since last re-evaluation the patient has improved with pain levels, strength and the ability to contract her pelvic floor  Patient reports a decrease in pain after internal release and has improved in her ability to contract and relax the pelvic floor  Patient notes less pain with ADLs, however mowing the lawn increases her pain  PT continues to educate the patient on proper mechanics when lifting and pushing  Although improvements have been made, the patient continues to be limited in pelvic floor strength and endurance, hip strength and lumbar ROM  PT will continue to address the noted impairments by performing hip and pelvic floor strengthening, stretching, balance, functional activities and manual techniques to allow the patient to return to her PLOF  PT recommended 1x/week for 4-6 weeks c a good prognosis 2* noted improvements  Impairments: abnormal muscle firing, abnormal or restricted ROM, activity intolerance, impaired physical strength, lacks appropriate home exercise program, pain with function, poor posture  and poor body mechanics    Symptom irritability: moderateUnderstanding of Dx/Px/POC: good   Prognosis: good    Goals  STG: In four weeks the patient will:    1  Be (I) with her HEP  (75% MET)  2  Increase hip and core strength to 4+/5 MMT score to assist c functional activities  (in progress)  3  Complete daily voiding and bowel log  (MET)  4  Perform x10 diaphragmatic breathes without cues  (MET)  5  Perform x 10 Kegels with a 4-5 second hold with proper breathing and relaxation of the pelvic floor muscles  (MET)      LTG: In six weeks, the patient will:    1  Perform x10 TA contractions with proper activation and no cues provided by PT  (MET)  2  Void without straining in the proper body mechanics  (MET)  3  tolerating speculum for well woman exam with minimal to no pain post  (MET)  4  Increase hip and core strength to 5/5 MMT score to assist c prolonged activities  (in progress)    New goals: in four weeks the patient will:  1  Chop an onion with propers TA and PF contraction and require no cues for breathing by PT  (50% MET)  2  Ascend and descend the stairs with TA activation  (75% MET)  3  Perform a floor transfer with proper form and TA activation  (50% MET)  4  Sleep 6-8 hours with minimal to no pain in the lumbar spine  (MET)  5  Mow the lawn without pain in the back  Plan  Patient would benefit from: skilled physical therapy and women's health eval  Referral necessary: No  Planned modality interventions: cryotherapy, thermotherapy: hydrocollator packs and biofeedback  Planned therapy interventions: abdominal trunk stabilization, joint mobilization, manual therapy, massage, Jack taping, neuromuscular re-education, patient education, postural training, strengthening, stretching, therapeutic activities, therapeutic exercise, transfer training, home exercise program, functional ROM exercises, flexibility, breathing training and body mechanics training  Frequency: 1x week  Duration in visits: 6  Duration in weeks: 6  Plan of Care beginning date: 6/17/2020  Plan of Care expiration date: 9/30/2020  Treatment plan discussed with: patient        Subjective Evaluation    History of Present Illness  Mechanism of injury: Patient noted she is 60% back to her PLOF  Patient noted overall her back and pelvic pain has decreased  Patient noted the internal release has helped as well as the exercises  Patient noted since last session her pain decrease from an 8/10 to a 3/10 and there were two days were she did not have pain  Patient noted the  seems to increase her pain  Recurrent probem    Quality of life: good    Pain  Current pain ratin  At best pain ratin  At worst pain ratin  Location: pelvic and lumbar spine  Quality: dull ache and pulling  Relieving factors: rest  Aggravating factors: sitting  Progression: improved    Social Support    Employment status: not working  Hand dominance: right  Life stress: 5 children to take care of     Patient Goals  Patient goals for therapy: increased strength, independence with ADLs/IADLs, return to sport/leisure activities, increased motion and decreased pain  Patient goal: "to have less pain with well women exam " (75% MET) "to sleep without pain " (MET) "to mow the lawn without pain " (25% MET)        Objective     Active Range of Motion     Lumbar   Flexion:  Restriction level: minimal  Extension:  Restriction level: minimal  Left rotation:  Restriction level: minimal  Right rotation:  Restriction level: minimal    Strength/Myotome Testing     Left Hip   Planes of Motion   Flexion: 4+  Abduction: 4+  Adduction: 4+  External rotation: 4+  Internal rotation: 4+    Right Hip   Planes of Motion   Flexion: 4+  Abduction: 4+  Adduction: 4+  External rotation: 4+  Internal rotation: 4+  Pelvic Floor Exam     General Perineum Exam:   perineum intact     Negative for no pelvic organ prolapse at rest and perianal erythema    Visual Inspection of Perineum:   Excursion of perineal body in cephalad direction with contraction of pelvic floor muscles (PFM): fair  and good  Excursion of perineal body in caudal direction with relaxation of pelvic floor muscles (PFM): fair  and good   Involuntary relaxation with bearing down: no  Cotton swab test: non-tender  Cough reflex: no cough reflex  Sphincter Tone Resting: normal  Sphincter Tone Squeeze: weak  Sensation: intact  Tenderness: unprovoked    Pelvic Organ Prolapse no pelvic organ prolapse at rest    Pelvic Floor Muscle Exam     Palpation   Minimal increased muscle tension in the pubococcygeus and iliococcygeus  Minimal tenderness on right in the pubococcygeus and iliococcygeus  No tenderness on left in the pubococcygeus and iliococcygeus    Muscle Contraction: substitution and mild  Breathing pattern with contraction: apical and diaphragmatic  Pelvic floor muscle relaxation is complete  2 seconds required for complete relaxation       PERFECT Score   Power right: 3/5  Power left: 3/5  Endurance (seconds to max): 4  Repetitions (before fatigue): 3  Fast flicks (in 10 seconds): 8    SMEG Biofeedback   Sensor used: external sensors placed perianally  Positioning: sitting  Holding ability: poor and fair  Derecruitment: fairelevated and low  Biofeedback comments: Seated: rest: 0- 5; contraction: 7 mV  Supine: rest: 5 mV; contraction: 40mv        Flowsheet Rows      Most Recent Value   PT/OT G-Codes   Current Score  13   Projected Score  0        Precautions: interstitial cystitis, hashimoto's, prolapse    Manuals 7/2 7/9 7/15 7/24 8/6 8/19       Internal release    MW  Layer 3 was tight MW  layer 3 was tight MW  layer 3 was tight       Trigger point release to glute meds  prox hamstring, hip flex & piriformis  MW MW  prox hamstring, piriformis MW  prox hamstring, piriformis  MW       Pelvic rocking             ILU massage  MW           Re-eval      MW       PA to lumbar spine             Neuro Re-Ed     Pelvic tilt + multifidus x15  3" hold            Posture, HEP, pelvic education MW  27' MW           Multifidus sidelying x10 ea  3" hold            Seated TA + multifidus x15  3" hold            kegel and glute squeeze x10  3" hold            Sciatic nerve glide  x16 ea           Prone I and T   x10 ea  Mod cues          Repeated flexion in lying (double knee to chest with towel)    x10  improved pain levels Trio seated on towels    2x10  Mod cues         Bio feedback: seated and supine     30'  3-5" hold  restin-7        Cable pulls to simulate pulling  + TA      40#  2x10       Pushing cart to simulate pushing  + TA      15'       Scapular retraction   2x10  5" hold          Ther Ex    Piriformis stretch 5x15" ea            Butterfly stretch 3x30" hold           Hamstring stretch (contract/relax)  3x5 ea           Hip flexor standing stretch on chair  5x10"           Hip flexor isometric + TA   x10  5" hold ea          Quad set + TA   x10  5" hold ea                                    Ther Activity                              Gait Training                              Modalities

## 2020-08-19 NOTE — LETTER
2020    Boaz Baca DO  [facility]  [address]    Patient: Laura Villavicencio   YOB: 1970   Date of Visit: 2020     Encounter Diagnosis     ICD-10-CM    1  Muscle pain  M79 10    2  Pelvic pressure in female  R10 2    3  Dyspareunia, female  N94 10    4  Chronic right-sided low back pain without sciatica  M54 5     G89 29        Dear Dr Kaur Gopi: Thank you for your recent referral of Laura Villavicencio  Please review the attached evaluation summary from Zoraida's recent visit  Please verify that you agree with the plan of care by signing the attached order  If you have any questions or concerns, please do not hesitate to call  I sincerely appreciate the opportunity to share in the care of one of your patients and hope to have another opportunity to work with you in the near future  Sincerely,    Terrell Jefferson, PT      Referring Provider:      I certify that I have read the below Plan of Care and certify the need for these services furnished under this plan of treatment while under my care  Boaz Baca DO  [facility] [place] [address]  VIA Facsimile: [phone]          PT Re-Evaluation     Today's date: 2020  Patient name: Laura Villavicencio  : 1970  MRN: [id number]  Referring provider: Sunil Rosales DO  Dx:   Encounter Diagnosis     ICD-10-CM    1  Muscle pain  M79 10    2  Pelvic pressure in female  R10 2    3  Dyspareunia, female  N94 10    4  Chronic right-sided low back pain without sciatica  M54 5     G89 29        Start Time: 830  Stop Time: 930  Total time in clinic (min): 60 minutes    Assessment  Assessment details: Since last re-evaluation the patient has improved with pain levels, strength and the ability to contract her pelvic floor   Patient reports a decrease in pain after internal release and has improved in her ability to contract and relax the pelvic floor  Patient notes less pain with ADLs, however mowing the lawn increases her pain  PT continues to educate the patient on proper mechanics when lifting and pushing  Although improvements have been made, the patient continues to be limited in pelvic floor strength and endurance, hip strength and lumbar ROM  PT will continue to address the noted impairments by performing hip and pelvic floor strengthening, stretching, balance, functional activities and manual techniques to allow the patient to return to her PLOF  PT recommended 1x/week for 4-6 weeks c a good prognosis 2* noted improvements  Impairments: abnormal muscle firing, abnormal or restricted ROM, activity intolerance, impaired physical strength, lacks appropriate home exercise program, pain with function, poor posture  and poor body mechanics    Symptom irritability: moderateUnderstanding of Dx/Px/POC: good   Prognosis: good    Goals  STG: In four weeks the patient will:    1  Be (I) with her HEP  (75% MET)  2  Increase hip and core strength to 4+/5 MMT score to assist c functional activities  (in progress)  3  Complete daily voiding and bowel log  (MET)  4  Perform x10 diaphragmatic breathes without cues  (MET)  5  Perform x 10 Kegels with a 4-5 second hold with proper breathing and relaxation of the pelvic floor muscles  (MET)      LTG: In six weeks, the patient will:    1  Perform x10 TA contractions with proper activation and no cues provided by PT  (MET)  2  Void without straining in the proper body mechanics  (MET)  3  tolerating speculum for well woman exam with minimal to no pain post  (MET)  4  Increase hip and core strength to 5/5 MMT score to assist c prolonged activities  (in progress)    New goals: in four weeks the patient will:  1  Chop an onion with propers TA and PF contraction and require no cues for breathing by PT  (50% MET)  2  Ascend and descend the stairs with TA activation  (75% MET)  3   Perform a floor transfer with proper form and TA activation  (50% MET)  4  Sleep 6-8 hours with minimal to no pain in the lumbar spine  (MET)  5  Mow the lawn without pain in the back  Plan  Patient would benefit from: skilled physical therapy and women's health eval  Referral necessary: No  Planned modality interventions: cryotherapy, thermotherapy: hydrocollator packs and biofeedback  Planned therapy interventions: abdominal trunk stabilization, joint mobilization, manual therapy, massage, Jack taping, neuromuscular re-education, patient education, postural training, strengthening, stretching, therapeutic activities, therapeutic exercise, transfer training, home exercise program, functional ROM exercises, flexibility, breathing training and body mechanics training  Frequency: 1x week  Duration in visits: 6  Duration in weeks: 6  Plan of Care beginning date: 2020  Plan of Care expiration date: 2020  Treatment plan discussed with: patient        Subjective Evaluation    History of Present Illness  Mechanism of injury: Patient noted she is 60% back to her PLOF  Patient noted overall her back and pelvic pain has decreased  Patient noted the internal release has helped as well as the exercises  Patient noted since last session her pain decrease from an 8/10 to a 3/10 and there were two days were she did not have pain  Patient noted the  seems to increase her pain             Recurrent probem    Quality of life: good    Pain  Current pain ratin  At best pain ratin  At worst pain ratin  Location: pelvic and lumbar spine  Quality: dull ache and pulling  Relieving factors: rest  Aggravating factors: sitting  Progression: improved    Social Support    Employment status: not working  Hand dominance: right  Life stress: 5 children to take care of     Patient Goals  Patient goals for therapy: increased strength, independence with ADLs/IADLs, return to sport/leisure activities, increased motion and decreased pain  Patient goal: "to have less pain with well women exam " (75% MET) "to sleep without pain " (MET) "to mow the lawn without pain " (25% MET)        Objective     Active Range of Motion     Lumbar   Flexion:  Restriction level: minimal  Extension:  Restriction level: minimal  Left rotation:  Restriction level: minimal  Right rotation:  Restriction level: minimal    Strength/Myotome Testing     Left Hip   Planes of Motion   Flexion: 4+  Abduction: 4+  Adduction: 4+  External rotation: 4+  Internal rotation: 4+    Right Hip   Planes of Motion   Flexion: 4+  Abduction: 4+  Adduction: 4+  External rotation: 4+  Internal rotation: 4+  Pelvic Floor Exam     General Perineum Exam:   perineum intact  Negative for no pelvic organ prolapse at rest and perianal erythema    Visual Inspection of Perineum:   Excursion of perineal body in cephalad direction with contraction of pelvic floor muscles (PFM): fair  and good  Excursion of perineal body in caudal direction with relaxation of pelvic floor muscles (PFM): fair  and good   Involuntary relaxation with bearing down: no  Cotton swab test: non-tender  Cough reflex: no cough reflex  Sphincter Tone Resting: normal  Sphincter Tone Squeeze: weak  Sensation: intact  Tenderness: unprovoked    Pelvic Organ Prolapse no pelvic organ prolapse at rest    Pelvic Floor Muscle Exam     Palpation   Minimal increased muscle tension in the pubococcygeus and iliococcygeus  Minimal tenderness on right in the pubococcygeus and iliococcygeus  No tenderness on left in the pubococcygeus and iliococcygeus    Muscle Contraction: substitution and mild  Breathing pattern with contraction: apical and diaphragmatic  Pelvic floor muscle relaxation is complete  2 seconds required for complete relaxation       PERFECT Score   Power right: 3/5  Power left: 3/5  Endurance (seconds to max): 4  Repetitions (before fatigue): 3  Fast flicks (in 10 seconds): 8    SMEG Biofeedback   Sensor used: external sensors placed perianally  Positioning: sitting  Holding ability: poor and fair  Derecruitment: fairelevated and low  Biofeedback comments: Seated: rest: 0- 5; contraction: 7 mV  Supine: rest: 5 mV; contraction: 40mv        Flowsheet Rows      Most Recent Value   PT/OT G-Codes   Current Score  13   Projected Score  0        Precautions: interstitial cystitis, hashimoto's, prolapse    Manuals 7/2 7/9 7/15 7/24 8/6 8/19       Internal release    MW  Layer 3 was tight MW  layer 3 was tight MW  layer 3 was tight       Trigger point release to glute meds  prox hamstring, hip flex & piriformis  MW MW  prox hamstring, piriformis MW  prox hamstring, piriformis  MW       Pelvic rocking             ILU massage  MW           Re-eval      MW       PA to lumbar spine             Neuro Re-Ed     Pelvic tilt + multifidus x15  3" hold            Posture, HEP, pelvic education MW  27' MW           Multifidus sidelying x10 ea  3" hold            Seated TA + multifidus x15  3" hold            kegel and glute squeeze x10  3" hold            Sciatic nerve glide  x16 ea           Prone I and T   x10 ea  Mod cues          Repeated flexion in lying (double knee to chest with towel)    x10  improved pain levels         Trio seated on towels    2x10  Mod cues         Bio feedback: seated and supine     30'  3-5" hold  restin-7        Cable pulls to simulate pulling  + TA      40#  2x10       Pushing cart to simulate pushing  + TA      15'       Scapular retraction   2x10  5" hold          Ther Ex    Piriformis stretch 5x15" ea            Butterfly stretch 3x30" hold           Hamstring stretch (contract/relax)  3x5 ea           Hip flexor standing stretch on chair  5x10"           Hip flexor isometric + TA   x10  5" hold ea          Quad set + TA   x10  5" hold ea                                    Ther Activity                              Gait Training                              Modalities

## 2020-08-19 NOTE — PROGRESS NOTES
Daily Note     Today's date: 2020  Patient name: Praveena Stein  : 1970  MRN: [id number]  Referring provider: Ahmet Cooper DO  Dx:   Encounter Diagnosis     ICD-10-CM    1  Muscle pain  M79 10    2  Pelvic pressure in female  R10 2    3  Dyspareunia, female  N94 10    4  Chronic right-sided low back pain without sciatica  M54 5     G89 29                   Subjective: ***      Objective: See treatment diary below      Assessment: Tolerated treatment {Tolerated treatment :}  Patient {assessment:5669032348}      Plan: {PLAN:0204036615}     Precautions: interstitial cystitis, hashimoto's, prolapse    Manuals 7/2 7/9 7/15 7/24 8/6        Internal release    MW  Layer 3 was tight MW  layer 3 was tight        Trigger point release to glute meds  prox hamstring, hip flex & piriformis  MW MW  prox hamstring, piriformis MW  prox hamstring, piriformis         Pelvic rocking             ILU massage  MW           PA to lumbar spine             Neuro Re-Ed     Pelvic tilt + multifidus x15  3" hold            Posture, HEP, pelvic education MW  27' MW           Multifidus sidelying x10 ea  3" hold            Seated TA + multifidus x15  3" hold            kegel and glute squeeze x10  3" hold            Sciatic nerve glide  x16 ea           Prone I and T   x10 ea  Mod cues          Repeated flexion in lying (double knee to chest with towel)    x10  improved pain levels         Trio seated on towels    2x10  Mod cues         Bio feedback: seated and supine     30'  3-5" hold  restin-7        Scapular retraction   2x10  5" hold          Ther Ex    Piriformis stretch 5x15" ea            Butterfly stretch 3x30" hold           Hamstring stretch (contract/relax)  3x5 ea           Hip flexor standing stretch on chair  5x10"           Hip flexor isometric + TA   x10  5" hold ea          Quad set + TA   x10  5" hold ea                                    Ther Activity                              Gait Training                              Modalities

## 2020-08-20 ENCOUNTER — TRANSCRIBE ORDERS (OUTPATIENT)
Dept: PHYSICAL THERAPY | Facility: CLINIC | Age: 50
End: 2020-08-20

## 2020-08-20 DIAGNOSIS — F02.81 LATE ONSET ALZHEIMER'S DISEASE WITH BEHAVIORAL DISTURBANCE (HCC): ICD-10-CM

## 2020-08-20 DIAGNOSIS — R29.898 RIGHT LEG WEAKNESS: ICD-10-CM

## 2020-08-20 DIAGNOSIS — M79.10 MUSCLE PAIN: ICD-10-CM

## 2020-08-20 DIAGNOSIS — M54.50 CHRONIC RIGHT-SIDED LOW BACK PAIN WITHOUT SCIATICA: ICD-10-CM

## 2020-08-20 DIAGNOSIS — R10.2 PELVIC PRESSURE IN FEMALE: ICD-10-CM

## 2020-08-20 DIAGNOSIS — N94.10 DYSPAREUNIA, FEMALE: ICD-10-CM

## 2020-08-20 DIAGNOSIS — G89.29 CHRONIC RIGHT-SIDED LOW BACK PAIN WITHOUT SCIATICA: ICD-10-CM

## 2020-08-20 DIAGNOSIS — G30.1 LATE ONSET ALZHEIMER'S DISEASE WITH BEHAVIORAL DISTURBANCE (HCC): ICD-10-CM

## 2020-08-20 DIAGNOSIS — R26.9 ABNORMAL GAIT: Primary | ICD-10-CM

## 2020-08-26 ENCOUNTER — APPOINTMENT (OUTPATIENT)
Dept: PHYSICAL THERAPY | Facility: CLINIC | Age: 50
End: 2020-08-26
Payer: COMMERCIAL

## 2020-08-27 ENCOUNTER — OFFICE VISIT (OUTPATIENT)
Dept: PHYSICAL THERAPY | Facility: CLINIC | Age: 50
End: 2020-08-27
Payer: COMMERCIAL

## 2020-08-27 DIAGNOSIS — R10.2 PELVIC PRESSURE IN FEMALE: ICD-10-CM

## 2020-08-27 DIAGNOSIS — M79.10 MUSCLE PAIN: Primary | ICD-10-CM

## 2020-08-27 DIAGNOSIS — M54.50 CHRONIC RIGHT-SIDED LOW BACK PAIN WITHOUT SCIATICA: ICD-10-CM

## 2020-08-27 DIAGNOSIS — G89.29 CHRONIC RIGHT-SIDED LOW BACK PAIN WITHOUT SCIATICA: ICD-10-CM

## 2020-08-27 DIAGNOSIS — N94.10 DYSPAREUNIA, FEMALE: ICD-10-CM

## 2020-08-27 PROCEDURE — 97112 NEUROMUSCULAR REEDUCATION: CPT

## 2020-08-27 PROCEDURE — 97110 THERAPEUTIC EXERCISES: CPT

## 2020-08-27 PROCEDURE — 97140 MANUAL THERAPY 1/> REGIONS: CPT

## 2020-08-27 NOTE — PROGRESS NOTES
Daily Note     Today's date: 2020  Patient name: Rachel Diaz  : 1970  MRN: [id number]  Referring provider: Madelin Barrett DO  Dx:   Encounter Diagnosis     ICD-10-CM    1  Muscle pain  M79 10    2  Pelvic pressure in female  R10 2    3  Dyspareunia, female  N94 10    4  Chronic right-sided low back pain without sciatica  M54 5     G89 29        Start Time: 08  Stop Time: 900  Total time in clinic (min): 60 minutes    Subjective: Patient noted that she did not have pain for 4 days after last session in the pelvis  Patient noted her thyroid medication has been too high so she has not been sleeping  Patient noted when she does not sleep her back pain gets worse  Objective: See treatment diary below      Assessment: PT performed internal release with the 3rd layer presenting with increased trigger points on the R when compared to the L  Patient provided verbal consent for internal release  PT performed STM to the upper R and L quadrant 2* fascial restrictions  Patient noted improvements post manuals  Session focused on diaphragmatic breathing  Patient improved with cues, and performed them in a quad position as well as supine with a weight  Patient would benefit from continued PT to allow the patient to return to her PLOF  Plan: Continue per plan of care        Precautions: interstitial cystitis, hashimoto's, prolapse    Manuals 7/2 7/9 7/15 7/24 8/6 8/19 8/27      Internal release    MW  Layer 3 was tight MW  layer 3 was tight MW  layer 3 was tight MW  layer 3 was tight      Trigger point release to glute meds  prox hamstring, hip flex & piriformis  MW MW  prox hamstring, piriformis MW  prox hamstring, piriformis  MW       Pelvic rocking       STM  R & L UQ      ILU massage  MW           Re-eval      MW       PA to lumbar spine             Neuro Re-Ed     Diaphragmaticbreathing: quad and supine       2x10 ea  10#      Pelvic tilt + multifidus x15  3" hold            Posture, HEP, pelvic education MW  27' MW           Multifidus sidelying x10 ea  3" hold            Seated TA + multifidus x15  3" hold            kegel and glute squeeze x10  3" hold            Sciatic nerve glide  x16 ea           Prone I and T   x10 ea  Mod cues          Repeated flexion in lying (double knee to chest with towel)    x10  improved pain levels         Trio seated on towels    2x10  Mod cues         Bio feedback: seated and supine     30'  3-5" hold  restin-7        Cable pulls to simulate pulling  + TA      40#  2x10       Pushing cart to simulate pushing  + TA      15'       Scapular retraction   2x10  5" hold          Ther Ex    Piriformis stretch 5x15" ea      5x15" ea      Butterfly stretch 3x30" hold     3x30"       Hamstring stretch (contract/relax)  3x5 ea           Hip flexor standing stretch on chair  5x10"           Hip flexor isometric + TA   x10  5" hold ea          Quad set + TA   x10  5" hold ea          Open books       5x15" ea                   Ther Activity                              Gait Training                              Modalities

## 2020-09-03 ENCOUNTER — OFFICE VISIT (OUTPATIENT)
Dept: PHYSICAL THERAPY | Facility: CLINIC | Age: 50
End: 2020-09-03
Payer: COMMERCIAL

## 2020-09-03 DIAGNOSIS — R10.2 PELVIC PRESSURE IN FEMALE: ICD-10-CM

## 2020-09-03 DIAGNOSIS — M54.50 CHRONIC RIGHT-SIDED LOW BACK PAIN WITHOUT SCIATICA: ICD-10-CM

## 2020-09-03 DIAGNOSIS — N94.10 DYSPAREUNIA, FEMALE: ICD-10-CM

## 2020-09-03 DIAGNOSIS — M79.10 MUSCLE PAIN: Primary | ICD-10-CM

## 2020-09-03 DIAGNOSIS — G89.29 CHRONIC RIGHT-SIDED LOW BACK PAIN WITHOUT SCIATICA: ICD-10-CM

## 2020-09-03 PROCEDURE — 97140 MANUAL THERAPY 1/> REGIONS: CPT

## 2020-09-03 PROCEDURE — 97112 NEUROMUSCULAR REEDUCATION: CPT

## 2020-09-03 PROCEDURE — 97110 THERAPEUTIC EXERCISES: CPT

## 2020-09-03 NOTE — PROGRESS NOTES
Daily Note     Today's date: 9/3/2020  Patient name: Adrian Elizondo  : 1970  MRN: [id number]  Referring provider: Lola Mccloud DO  Dx:   Encounter Diagnosis     ICD-10-CM    1  Muscle pain  M79 10    2  Pelvic pressure in female  R10 2    3  Dyspareunia, female  N94 10    4  Chronic right-sided low back pain without sciatica  M54 5     G89 29        Start Time: 0800  Stop Time: 09  Total time in clinic (min): 60 minutes    Subjective: Patient noted she had about 5 days of pelvic relief after last session  Patient noted she is also is using her core when she is cleaning or pushing an object  Patient noted she continues to have R hip pain that radiates to her groin  Patient noted her neck pain has also been increased due to family emergency  Objective: See treatment diary below      Assessment: PT performed internal release due to improvements in pain levels for a few days  PT educated the patient use her trigger point wand for the pelvic floor to continue to improve her tissue mobility at home  Patient provided verbal consent for internal release  PT also performed sub-occipital release to assist c neck pain  PT examined the R hip and noted some symptoms of impingement and proximal hip adductor tightness  PT educated the patient on performing the butterfly stretch as well as clams and bridges to stabilize the joint  Patient required moderate cues for form for the bridges  Patient noted improvements in pain levels post session  Patient would benefit from continued PT      Plan: Continue per plan of care        Precautions: interstitial cystitis, hashimoto's, prolapse    Manuals 7/2 7/9 7/15 7/24 8/6 8/19 8/27 9/3     Internal release    MW  Layer 3 was tight MW  layer 3 was tight MW  layer 3 was tight MW  layer 3 was tight MW  Layer  2 & 3 were tight     Trigger point release to glute meds  prox hamstring, hip flex & piriformis  MW MW  prox hamstring, piriformis MW  prox hamstring, piriformis MW  Sub occipitals  MW     Pelvic rocking       STM  R & L UQ      ILU massage  MW           Re-eval      MW       PA to lumbar spine             Neuro Re-Ed     Diaphragmaticbreathing: quad and supine       2x10 ea  10#      Pelvic tilt + multifidus x15  3" hold            Posture, HEP, pelvic education MW  27' MW           Multifidus sidelying x10 ea  3" hold            Seated TA + multifidus x15  3" hold            kegel and glute squeeze x10  3" hold            Sciatic nerve glide  x16 ea           Prone I and T   x10 ea  Mod cues          Repeated flexion in lying (double knee to chest with towel)    x10  improved pain levels         Trio seated on towels    2x10  Mod cues    seated and supine  2x105" hold     Bio feedback: seated and supine     30'  3-5" hold  restin-7        Cable pulls to simulate pulling  + TA      40#  2x10       Pushing cart to simulate pushing  + TA      15'       clams        x15 ea     Bridges + TA        2x10     Scapular retraction   2x10  5" hold          Ther Ex    Piriformis stretch 5x15" ea      5x15" ea      Butterfly stretch 3x30" hold     3x30"  3x30"      Hamstring stretch (contract/relax)  3x5 ea           Hip flexor standing stretch on chair  5x10"           Hip flexor isometric + TA   x10  5" hold ea          Quad set + TA   x10  5" hold ea          Open books       5x15" ea      DL knee to chest (Frog)        2x10     Ther Activity                              Gait Training                              Modalities

## 2020-09-10 ENCOUNTER — OFFICE VISIT (OUTPATIENT)
Dept: PHYSICAL THERAPY | Facility: CLINIC | Age: 50
End: 2020-09-10
Payer: COMMERCIAL

## 2020-09-10 DIAGNOSIS — R10.2 PELVIC PRESSURE IN FEMALE: ICD-10-CM

## 2020-09-10 DIAGNOSIS — M79.10 MUSCLE PAIN: Primary | ICD-10-CM

## 2020-09-10 DIAGNOSIS — M54.50 CHRONIC RIGHT-SIDED LOW BACK PAIN WITHOUT SCIATICA: ICD-10-CM

## 2020-09-10 DIAGNOSIS — G89.29 CHRONIC RIGHT-SIDED LOW BACK PAIN WITHOUT SCIATICA: ICD-10-CM

## 2020-09-10 DIAGNOSIS — N94.10 DYSPAREUNIA, FEMALE: ICD-10-CM

## 2020-09-10 PROCEDURE — 97112 NEUROMUSCULAR REEDUCATION: CPT

## 2020-09-10 NOTE — PROGRESS NOTES
Daily Note     Today's date: 9/10/2020  Patient name: Shaneka Romero  : 1970  MRN: [id number]  Referring provider: Jordyn Lira DO  Dx:   Encounter Diagnosis     ICD-10-CM    1  Muscle pain  M79 10    2  Pelvic pressure in female  R10 2    3  Dyspareunia, female  N94 10    4  Chronic right-sided low back pain without sciatica  M54 5     G89 29        Start Time: 0830  Stop Time: 0915  Total time in clinic (min): 45 minutes    Subjective: Patient noted that she continues to feel relief in the pelvic floor after an internal release, however she is now noticing L groin pain  Patient noted that she continues to feel the pain move and at times she does not have any  Objective: See treatment diary below      Assessment: PT session consisted of education to the patient about her diagnosis and progressions of exercises  PT educated the patient on next steps and possible physicians to refer to for her pain levels  Patient would benefit from continued PT to allow the patient to return to her PLOF  Plan: Continue per plan of care  Patient will follow up with PT in one month and will follow up with PCP in the mean time        Precautions: interstitial cystitis, hashimoto's, prolapse    Manuals 7/2 7/9 7/15 7/24 8/6 8/19 8/27 9/3 9/10    Internal release    MW  Layer 3 was tight MW  layer 3 was tight MW  layer 3 was tight MW  layer 3 was tight MW  Layer  2 & 3 were tight     Trigger point release to glute meds  prox hamstring, hip flex & piriformis  MW MW  prox hamstring, piriformis MW  prox hamstring, piriformis  MW  Sub occipitals  MW     Pelvic rocking       STM  R & L UQ      ILU massage  MW           Re-eval      MW       PA to lumbar spine             Neuro Re-Ed     Diaphragmaticbreathing: quad and supine       2x10 ea  10#      Pelvic tilt + multifidus x15  3" hold            Posture, HEP, pelvic education MW  27' MW           Multifidus sidelying x10 ea  3" hold            Seated TA + multifidus x15  3" hold            kegel and glute squeeze x10  3" hold            Sciatic nerve glide  x16 ea           Prone I and T   x10 ea  Mod cues          Repeated flexion in lying (double knee to chest with towel)    x10  improved pain levels         Trio seated on towels    2x10  Mod cues    seated and supine  2x105" hold     Bio feedback: seated and supine     30'  3-5" hold  restin-7        Cable pulls to simulate pulling  + TA      40#  2x10       Pushing cart to simulate pushing  + TA      15'       clams        x15 ea     Bridges + TA        2x10     Dx education, exercise progression & potential referrals         MW  45'    Scapular retraction   2x10  5" hold          Ther Ex    Piriformis stretch 5x15" ea      5x15" ea      Butterfly stretch 3x30" hold     3x30"  3x30"      Hamstring stretch (contract/relax)  3x5 ea           Hip flexor standing stretch on chair  5x10"           Hip flexor isometric + TA   x10  5" hold ea          Quad set + TA   x10  5" hold ea          Open books       5x15" ea      DL knee to chest (Frog)        2x10     Ther Activity                              Gait Training                              Modalities

## 2020-10-15 ENCOUNTER — OFFICE VISIT (OUTPATIENT)
Dept: PHYSICAL THERAPY | Facility: CLINIC | Age: 50
End: 2020-10-15
Payer: COMMERCIAL

## 2020-10-15 DIAGNOSIS — N94.10 DYSPAREUNIA, FEMALE: ICD-10-CM

## 2020-10-15 DIAGNOSIS — R10.2 PELVIC PRESSURE IN FEMALE: ICD-10-CM

## 2020-10-15 DIAGNOSIS — M79.10 MUSCLE PAIN: ICD-10-CM

## 2020-10-15 DIAGNOSIS — M54.50 CHRONIC RIGHT-SIDED LOW BACK PAIN WITHOUT SCIATICA: Primary | ICD-10-CM

## 2020-10-15 DIAGNOSIS — M54.2 NECK PAIN: ICD-10-CM

## 2020-10-15 DIAGNOSIS — G89.29 CHRONIC RIGHT-SIDED LOW BACK PAIN WITHOUT SCIATICA: Primary | ICD-10-CM

## 2020-10-15 PROCEDURE — 97112 NEUROMUSCULAR REEDUCATION: CPT

## 2020-10-15 PROCEDURE — 97110 THERAPEUTIC EXERCISES: CPT

## 2020-10-15 PROCEDURE — 97140 MANUAL THERAPY 1/> REGIONS: CPT

## 2020-10-19 ENCOUNTER — TRANSCRIBE ORDERS (OUTPATIENT)
Dept: PHYSICAL THERAPY | Facility: CLINIC | Age: 50
End: 2020-10-19

## 2020-10-19 DIAGNOSIS — S46.011A STRAIN OF RIGHT ROTATOR CUFF CAPSULE, INITIAL ENCOUNTER: Primary | ICD-10-CM

## 2020-10-19 DIAGNOSIS — M75.41 IMPINGEMENT SYNDROME OF RIGHT SHOULDER: ICD-10-CM

## 2020-10-22 ENCOUNTER — OFFICE VISIT (OUTPATIENT)
Dept: PHYSICAL THERAPY | Facility: CLINIC | Age: 50
End: 2020-10-22
Payer: COMMERCIAL

## 2020-10-22 DIAGNOSIS — R10.2 PELVIC PRESSURE IN FEMALE: ICD-10-CM

## 2020-10-22 DIAGNOSIS — M54.50 CHRONIC RIGHT-SIDED LOW BACK PAIN WITHOUT SCIATICA: Primary | ICD-10-CM

## 2020-10-22 DIAGNOSIS — M79.10 MUSCLE PAIN: ICD-10-CM

## 2020-10-22 DIAGNOSIS — G89.29 CHRONIC RIGHT-SIDED LOW BACK PAIN WITHOUT SCIATICA: Primary | ICD-10-CM

## 2020-10-22 DIAGNOSIS — M54.2 NECK PAIN: ICD-10-CM

## 2020-10-22 DIAGNOSIS — N94.10 DYSPAREUNIA, FEMALE: ICD-10-CM

## 2020-10-22 PROCEDURE — 97140 MANUAL THERAPY 1/> REGIONS: CPT

## 2020-10-22 PROCEDURE — 97110 THERAPEUTIC EXERCISES: CPT

## 2020-10-22 PROCEDURE — 97112 NEUROMUSCULAR REEDUCATION: CPT

## 2020-10-29 ENCOUNTER — OFFICE VISIT (OUTPATIENT)
Dept: PHYSICAL THERAPY | Facility: CLINIC | Age: 50
End: 2020-10-29
Payer: COMMERCIAL

## 2020-10-29 DIAGNOSIS — M54.2 NECK PAIN: ICD-10-CM

## 2020-10-29 DIAGNOSIS — M54.50 CHRONIC RIGHT-SIDED LOW BACK PAIN WITHOUT SCIATICA: Primary | ICD-10-CM

## 2020-10-29 DIAGNOSIS — R10.2 PELVIC PRESSURE IN FEMALE: ICD-10-CM

## 2020-10-29 DIAGNOSIS — N94.10 DYSPAREUNIA, FEMALE: ICD-10-CM

## 2020-10-29 DIAGNOSIS — M79.10 MUSCLE PAIN: ICD-10-CM

## 2020-10-29 DIAGNOSIS — G89.29 CHRONIC RIGHT-SIDED LOW BACK PAIN WITHOUT SCIATICA: Primary | ICD-10-CM

## 2020-10-29 PROCEDURE — 97112 NEUROMUSCULAR REEDUCATION: CPT

## 2020-10-29 PROCEDURE — 97140 MANUAL THERAPY 1/> REGIONS: CPT

## 2020-11-04 ENCOUNTER — TRANSCRIBE ORDERS (OUTPATIENT)
Dept: PHYSICAL THERAPY | Facility: CLINIC | Age: 50
End: 2020-11-04

## 2020-11-04 DIAGNOSIS — N94.10 DYSPAREUNIA IN FEMALE: ICD-10-CM

## 2020-11-04 DIAGNOSIS — M79.10 MUSCLE PAIN: ICD-10-CM

## 2020-11-04 DIAGNOSIS — M54.50 CHRONIC RIGHT-SIDED LOW BACK PAIN WITHOUT SCIATICA: Primary | ICD-10-CM

## 2020-11-04 DIAGNOSIS — M54.2 NECK PAIN: ICD-10-CM

## 2020-11-04 DIAGNOSIS — G89.29 CHRONIC RIGHT-SIDED LOW BACK PAIN WITHOUT SCIATICA: Primary | ICD-10-CM

## 2020-11-04 DIAGNOSIS — R10.2 PELVIC PRESSURE IN FEMALE: ICD-10-CM

## 2020-11-05 ENCOUNTER — OFFICE VISIT (OUTPATIENT)
Dept: PHYSICAL THERAPY | Facility: CLINIC | Age: 50
End: 2020-11-05
Payer: COMMERCIAL

## 2020-11-05 DIAGNOSIS — R10.2 PELVIC PRESSURE IN FEMALE: ICD-10-CM

## 2020-11-05 DIAGNOSIS — N94.10 DYSPAREUNIA, FEMALE: ICD-10-CM

## 2020-11-05 DIAGNOSIS — M79.10 MUSCLE PAIN: ICD-10-CM

## 2020-11-05 DIAGNOSIS — G89.29 CHRONIC RIGHT-SIDED LOW BACK PAIN WITHOUT SCIATICA: Primary | ICD-10-CM

## 2020-11-05 DIAGNOSIS — M54.2 NECK PAIN: ICD-10-CM

## 2020-11-05 DIAGNOSIS — M54.50 CHRONIC RIGHT-SIDED LOW BACK PAIN WITHOUT SCIATICA: Primary | ICD-10-CM

## 2020-11-05 PROCEDURE — 97140 MANUAL THERAPY 1/> REGIONS: CPT

## 2020-11-05 PROCEDURE — 97112 NEUROMUSCULAR REEDUCATION: CPT

## 2020-11-05 PROCEDURE — 97110 THERAPEUTIC EXERCISES: CPT

## 2020-11-10 ENCOUNTER — APPOINTMENT (OUTPATIENT)
Dept: PHYSICAL THERAPY | Facility: CLINIC | Age: 50
End: 2020-11-10
Payer: COMMERCIAL

## 2020-11-19 ENCOUNTER — OFFICE VISIT (OUTPATIENT)
Dept: PHYSICAL THERAPY | Facility: CLINIC | Age: 50
End: 2020-11-19
Payer: COMMERCIAL

## 2020-11-19 DIAGNOSIS — G89.29 CHRONIC RIGHT-SIDED LOW BACK PAIN WITHOUT SCIATICA: Primary | ICD-10-CM

## 2020-11-19 DIAGNOSIS — R10.2 PELVIC PRESSURE IN FEMALE: ICD-10-CM

## 2020-11-19 DIAGNOSIS — M54.50 CHRONIC RIGHT-SIDED LOW BACK PAIN WITHOUT SCIATICA: Primary | ICD-10-CM

## 2020-11-19 DIAGNOSIS — M79.10 MUSCLE PAIN: ICD-10-CM

## 2020-11-19 DIAGNOSIS — M54.2 NECK PAIN: ICD-10-CM

## 2020-11-19 DIAGNOSIS — N94.10 DYSPAREUNIA, FEMALE: ICD-10-CM

## 2020-11-19 PROCEDURE — 97112 NEUROMUSCULAR REEDUCATION: CPT

## 2020-11-19 PROCEDURE — 97110 THERAPEUTIC EXERCISES: CPT

## 2020-11-24 ENCOUNTER — ANNUAL EXAM (OUTPATIENT)
Dept: GYNECOLOGY | Facility: CLINIC | Age: 50
End: 2020-11-24
Payer: COMMERCIAL

## 2020-11-24 VITALS
HEART RATE: 92 BPM | BODY MASS INDEX: 26.73 KG/M2 | SYSTOLIC BLOOD PRESSURE: 130 MMHG | WEIGHT: 160.4 LBS | DIASTOLIC BLOOD PRESSURE: 78 MMHG | HEIGHT: 65 IN

## 2020-11-24 DIAGNOSIS — Z12.31 ENCOUNTER FOR SCREENING MAMMOGRAM FOR MALIGNANT NEOPLASM OF BREAST: Primary | ICD-10-CM

## 2020-11-24 DIAGNOSIS — N95.1 PERIMENOPAUSE: ICD-10-CM

## 2020-11-24 DIAGNOSIS — Z12.11 SCREENING FOR COLON CANCER: ICD-10-CM

## 2020-11-24 DIAGNOSIS — Z12.4 ENCOUNTER FOR PAPANICOLAOU SMEAR FOR CERVICAL CANCER SCREENING: ICD-10-CM

## 2020-11-24 PROCEDURE — G0145 SCR C/V CYTO,THINLAYER,RESCR: HCPCS | Performed by: OBSTETRICS & GYNECOLOGY

## 2020-11-24 PROCEDURE — S0612 ANNUAL GYNECOLOGICAL EXAMINA: HCPCS | Performed by: OBSTETRICS & GYNECOLOGY

## 2020-11-24 RX ORDER — LEVOTHYROXINE AND LIOTHYRONINE 76; 18 UG/1; UG/1
120 TABLET ORAL DAILY
COMMUNITY

## 2020-11-30 LAB
LAB AP GYN PRIMARY INTERPRETATION: NORMAL
Lab: NORMAL

## 2020-12-03 ENCOUNTER — OFFICE VISIT (OUTPATIENT)
Dept: PHYSICAL THERAPY | Facility: CLINIC | Age: 50
End: 2020-12-03
Payer: COMMERCIAL

## 2020-12-03 DIAGNOSIS — M54.50 CHRONIC RIGHT-SIDED LOW BACK PAIN WITHOUT SCIATICA: Primary | ICD-10-CM

## 2020-12-03 DIAGNOSIS — R10.2 PELVIC PRESSURE IN FEMALE: ICD-10-CM

## 2020-12-03 DIAGNOSIS — M79.10 MUSCLE PAIN: ICD-10-CM

## 2020-12-03 DIAGNOSIS — G89.29 CHRONIC RIGHT-SIDED LOW BACK PAIN WITHOUT SCIATICA: Primary | ICD-10-CM

## 2020-12-03 DIAGNOSIS — M54.2 NECK PAIN: ICD-10-CM

## 2020-12-03 DIAGNOSIS — N94.10 DYSPAREUNIA, FEMALE: ICD-10-CM

## 2020-12-03 PROCEDURE — 97110 THERAPEUTIC EXERCISES: CPT

## 2020-12-03 PROCEDURE — 97140 MANUAL THERAPY 1/> REGIONS: CPT

## 2020-12-03 PROCEDURE — 97112 NEUROMUSCULAR REEDUCATION: CPT

## 2020-12-10 ENCOUNTER — APPOINTMENT (OUTPATIENT)
Dept: PHYSICAL THERAPY | Facility: CLINIC | Age: 50
End: 2020-12-10
Payer: COMMERCIAL

## 2021-01-07 ENCOUNTER — OFFICE VISIT (OUTPATIENT)
Dept: PHYSICAL THERAPY | Facility: CLINIC | Age: 51
End: 2021-01-07
Payer: COMMERCIAL

## 2021-01-07 DIAGNOSIS — G89.29 CHRONIC RIGHT-SIDED LOW BACK PAIN WITHOUT SCIATICA: Primary | ICD-10-CM

## 2021-01-07 DIAGNOSIS — M79.10 MUSCLE PAIN: ICD-10-CM

## 2021-01-07 DIAGNOSIS — M54.50 CHRONIC RIGHT-SIDED LOW BACK PAIN WITHOUT SCIATICA: Primary | ICD-10-CM

## 2021-01-07 DIAGNOSIS — R10.2 PELVIC PRESSURE IN FEMALE: ICD-10-CM

## 2021-01-07 DIAGNOSIS — M54.2 NECK PAIN: ICD-10-CM

## 2021-01-07 DIAGNOSIS — N94.10 DYSPAREUNIA, FEMALE: ICD-10-CM

## 2021-01-07 PROCEDURE — 97140 MANUAL THERAPY 1/> REGIONS: CPT

## 2021-01-07 PROCEDURE — 97112 NEUROMUSCULAR REEDUCATION: CPT

## 2021-01-07 NOTE — PROGRESS NOTES
Daily Note     Today's date: 2021  Patient name: Ender Adame  : 1970  MRN: [id number]  Referring provider: Brendan Hager DO  Dx:   Encounter Diagnosis     ICD-10-CM    1  Chronic right-sided low back pain without sciatica  M54 5     G89 29    2  Neck pain  M54 2    3  Muscle pain  M79 10    4  Pelvic pressure in female  R10 2    5  Dyspareunia, female  N94 10        Start Time: 1000  Stop Time: 1100  Total time in clinic (min): 60 minutes    Subjective: Patient noted that she shot lasted for about 6 weeks  Patient noted she feels about 20% improvement since the shot  When she was pain free she was able to perform her exercises  Patient noted she took a week off, however she started back up again  Patient noted her pelvis feels good, however her labral tears are acting up  Patient is also concerned about her fibromyalgia  Objective: See treatment diary below      Assessment: PT educated the patient on how exercise can assist c fibromyalgia pain  PT re-introduced hip hikes to her HEP to assist stabilization during standing and walking  Patient required cues for form by PT for clams and 3 way hip  PT performed trigger point release to (B) hip flexors, glute med and piriformis  Patient noted improvements in hip mobility post session  Patient would benefit from continued PT to allow the patient to return to her PLOF  Plan: Continue per plan of care  Follow up every two weeks        Precautions: interstitial cystitis, hashimoto's, prolapse      Manuals 10/15 10/22 10/29 11/5 11/19 12/3 1/7      Sub-occipital release MW MW MW MW         Re-eval MW            Hip flexor STM  MW    Piriformis STM  MW Piriformis, glute med and hip flexors  MW      Lumbar PA, pelvic rocking, SI PA   MW  Grade  III          Hip posterior and inferior mob       MW  grade  III                   Neuro Re-Ed     Chin tuck 2x10  5" hold 2x10  5" hold 2x10  5"hold          Scapular retraction 2x10  5" hold 2x10  5" hold 2x10  5" hold          Serratus punch nv 2x10  4" lower 2x10  4" lower          TA in quad nv x10  3" hold 2x10  3" hold kegel  x10  3" hold  x10  3" hold  trio       Myokinetic restroation ([address]) 90-90 hip lift and hip shift     x15 D/C       MK: R sidelying respiratory scissor slides     3x6  3" hold D/C       HEP educatoion     MW MW MW      Hip hikes       2x10 ea  firm      3 way hip       2x10ea      clams       2x10 ea                                                                                                 Ther Ex    UT stretch 5x15"ea 5x15" ea           Levator scap stretch 5x15" ea 5x15" ea           Hip add 2x10  5" hold 2x10  5" hold    + TA  x10  3" hold       Hip abd 2x10  5" hold  GTB 2x10  5" hold  GTB 2x10  5" hold  BTB sidelying  x10 ea  standing  x15ea       glute squeeze 2x10  5" hold 2x10  5" hold    Hip extension standing  x15 ea       Half butterfly    5x15"ea 5x15" ea  whole        Piriformis stretch (pulling towards)    5x15" ea 5x15" ea                     Ther Activity                              Gait Training                              Modalities

## 2021-01-20 ENCOUNTER — OFFICE VISIT (OUTPATIENT)
Dept: PHYSICAL THERAPY | Facility: CLINIC | Age: 51
End: 2021-01-20
Payer: COMMERCIAL

## 2021-01-20 DIAGNOSIS — M54.2 NECK PAIN: ICD-10-CM

## 2021-01-20 DIAGNOSIS — M54.50 CHRONIC RIGHT-SIDED LOW BACK PAIN WITHOUT SCIATICA: Primary | ICD-10-CM

## 2021-01-20 DIAGNOSIS — N94.10 DYSPAREUNIA, FEMALE: ICD-10-CM

## 2021-01-20 DIAGNOSIS — G89.29 CHRONIC RIGHT-SIDED LOW BACK PAIN WITHOUT SCIATICA: Primary | ICD-10-CM

## 2021-01-20 DIAGNOSIS — R10.2 PELVIC PRESSURE IN FEMALE: ICD-10-CM

## 2021-01-20 DIAGNOSIS — M79.10 MUSCLE PAIN: ICD-10-CM

## 2021-01-20 PROCEDURE — 97112 NEUROMUSCULAR REEDUCATION: CPT

## 2021-01-20 PROCEDURE — 97110 THERAPEUTIC EXERCISES: CPT

## 2021-01-20 PROCEDURE — 97140 MANUAL THERAPY 1/> REGIONS: CPT

## 2021-01-20 NOTE — PROGRESS NOTES
Daily Note     Today's date: 2021  Patient name: Jennifer Harry  : 1970  MRN: [id number]  Referring provider: Oumou Ambrose DO  Dx:   Encounter Diagnosis     ICD-10-CM    1  Chronic right-sided low back pain without sciatica  M54 5     G89 29    2  Neck pain  M54 2    3  Muscle pain  M79 10    4  Pelvic pressure in female  R10 2    5  Dyspareunia, female  N94 10        Start Time: 1400  Stop Time: 84  Total time in clinic (min): 45 minutes    Subjective: Patient noted that the shot completely wore off  Patient noted she was in a lot of pain yesterday  Patient noted she does feel stronger in the core  Objective: See treatment diary below      Assessment: PT educated the patient on her diagnosis and anatomy of the pelvis to explain pain  PT educated the patient to increased reps to 30 for her core and hip strengthening exercises to assist c endurance  PT introduced the elliptical to assist c endurance and cardiovascular strength  Patient noted no increased pain  Patient noted improvements post mobilizations and after self SI mobilization  Patient would benefit from continued PT to allow the patient to return to her PLOF  Plan: Continue per plan of care  Patient will follow up every two weeks        Precautions: interstitial cystitis, hashimoto's, prolapse      Manuals 10/15 10/22 10/29 11/5 11/19 12/3 1/7 1/20     Sub-occipital release MW MW MW MW         Re-eval MW            Hip flexor STM  MW    Piriformis STM  MW Piriformis, glute med and hip flexors  MW      Lumbar PA, pelvic rocking, SI PA   MW  Grade  III     MW  grade  III     Hip posterior and inferior mob       MW  grade  III self  postmob  Grade II                  Neuro Re-Ed     Chin tuck 2x10  5" hold 2x10  5" hold 2x10  5"hold          Scapular retraction 2x10  5" hold 2x10  5" hold 2x10  5" hold          Serratus punch nv 2x10  4" lower 2x10  4" lower          TA in quad nv x10  3" hold 2x10  3" hold kegel  x10  3" hold  x10  3" hold  trio       Myokinetic restroation ([facility]) 90-90 hip lift and hip shift     x15 D/C       MK: R sidelying respiratory scissor slides     3x6  3" hold D/C       HEP educatoion     MW MW MW MW     Hip hikes       2x10 ea  firm 2x10 ea     3 way hip       2x10ea 2x10 ea     clams       2x10 ea                                                                                                 Ther Ex    UT stretch 5x15"ea 5x15" ea           Levator scap stretch 5x15" ea 5x15" ea           Hip add 2x10  5" hold 2x10  5" hold    + TA  x10  3" hold       Hip abd 2x10  5" hold  GTB 2x10  5" hold  GTB 2x10  5" hold  BTB sidelying  x10 ea  standing  x15ea       glute squeeze 2x10  5" hold 2x10  5" hold    Hip extension standing  x15 ea       Half butterfly    5x15"ea 5x15" ea  whole        Piriformis stretch (pulling towards)    5x15" ea 5x15" ea        Elipitical        5'     Ther Activity                              Gait Training                              Modalities

## 2021-01-21 ENCOUNTER — APPOINTMENT (OUTPATIENT)
Dept: PHYSICAL THERAPY | Facility: CLINIC | Age: 51
End: 2021-01-21
Payer: COMMERCIAL

## 2021-02-04 ENCOUNTER — OFFICE VISIT (OUTPATIENT)
Dept: PHYSICAL THERAPY | Facility: CLINIC | Age: 51
End: 2021-02-04
Payer: COMMERCIAL

## 2021-02-04 DIAGNOSIS — M54.2 NECK PAIN: ICD-10-CM

## 2021-02-04 DIAGNOSIS — R10.2 PELVIC PRESSURE IN FEMALE: ICD-10-CM

## 2021-02-04 DIAGNOSIS — M79.10 MUSCLE PAIN: ICD-10-CM

## 2021-02-04 DIAGNOSIS — N94.10 DYSPAREUNIA, FEMALE: ICD-10-CM

## 2021-02-04 DIAGNOSIS — G89.29 CHRONIC RIGHT-SIDED LOW BACK PAIN WITHOUT SCIATICA: Primary | ICD-10-CM

## 2021-02-04 DIAGNOSIS — M54.50 CHRONIC RIGHT-SIDED LOW BACK PAIN WITHOUT SCIATICA: Primary | ICD-10-CM

## 2021-02-04 PROCEDURE — 97112 NEUROMUSCULAR REEDUCATION: CPT

## 2021-02-04 NOTE — PROGRESS NOTES
Daily Note     Today's date: 2021  Patient name: Wesley Miller  : 1970  MRN: [id number]  Referring provider: Hermann Mckeon DO  Dx:   Encounter Diagnosis     ICD-10-CM    1  Chronic right-sided low back pain without sciatica  M54 5     G89 29    2  Neck pain  M54 2    3  Muscle pain  M79 10    4  Pelvic pressure in female  R10 2    5  Dyspareunia, female  N94 10        Start Time: 1000  Stop Time: 1055  Total time in clinic (min): 55 minutes    Subjective: Patient noted that she is now up to 13 minutes on the elliptical without pain as well as performed her HEP on a daily basis since last visit  Patient noted about 8 days ago she had an increase in R groin pain, however after a few exercises it decreased  Objective: See treatment diary below      Assessment: The session was spent on education and progression of HEP  PT educated the patient on anatomy of the hips and pelvis and how it relates to her clinic  PT educated the patient on specific positions to be in when in hip pain  Patient continues to progress with strength 2* minimal pain and increased time on the elliptical  Patient would benefit from continued PT to allow the patient to return to her PLOF  Plan: Continue per plan of care        Precautions: interstitial cystitis, hashimoto's, prolapse      Manuals 10/15 10/22 10/29 11/5 11/19 12/3 1/7 1/20 2/4    Sub-occipital release MW MW MW MW         Re-eval MW            Hip flexor STM  MW    Piriformis STM  MW Piriformis, glute med and hip flexors  MW      Lumbar PA, pelvic rocking, SI PA   MW  Grade  III     MW  grade  III     Hip posterior and inferior mob       MW  grade  III self  postmob  Grade II                  Neuro Re-Ed     Chin tuck 2x10  5" hold 2x10  5" hold 2x10  5"hold          Scapular retraction 2x10  5" hold 2x10  5" hold 2x10  5" hold          Serratus punch nv 2x10  4" lower 2x10  4" lower          TA in quad nv x10  3" hold 2x10  3" hold kegel  x10  3" hold  x10  3" hold  trio       Myokinetic restroation ([address]) 90-90 hip lift and hip shift     x15 D/C       MK: R sidelying respiratory scissor slides     3x6  3" hold D/C       HEP educatoion     MW MW MW MW HEP, anatomy, positioning, progression  MW    Hip hikes       2x10 ea  firm 2x10 ea     3 way hip       2x10ea 2x10 ea     clams       2x10 ea                                                                                                 Ther Ex    UT stretch 5x15"ea 5x15" ea           Levator scap stretch 5x15" ea 5x15" ea           Hip add 2x10  5" hold 2x10  5" hold    + TA  x10  3" hold       Hip abd 2x10  5" hold  GTB 2x10  5" hold  GTB 2x10  5" hold  BTB sidelying  x10 ea  standing  x15ea       glute squeeze 2x10  5" hold 2x10  5" hold    Hip extension standing  x15 ea       Half butterfly    5x15"ea 5x15" ea  whole        Piriformis stretch (pulling towards)    5x15" ea 5x15" ea        Elipitical        5'     Ther Activity                              Gait Training                              Modalities

## 2021-02-10 ENCOUNTER — APPOINTMENT (OUTPATIENT)
Dept: PHYSICAL THERAPY | Facility: CLINIC | Age: 51
End: 2021-02-10
Payer: COMMERCIAL

## 2021-02-17 ENCOUNTER — OFFICE VISIT (OUTPATIENT)
Dept: PHYSICAL THERAPY | Facility: CLINIC | Age: 51
End: 2021-02-17
Payer: COMMERCIAL

## 2021-02-17 DIAGNOSIS — M54.2 NECK PAIN: ICD-10-CM

## 2021-02-17 DIAGNOSIS — R10.2 PELVIC PRESSURE IN FEMALE: ICD-10-CM

## 2021-02-17 DIAGNOSIS — M54.50 CHRONIC RIGHT-SIDED LOW BACK PAIN WITHOUT SCIATICA: Primary | ICD-10-CM

## 2021-02-17 DIAGNOSIS — G89.29 CHRONIC RIGHT-SIDED LOW BACK PAIN WITHOUT SCIATICA: Primary | ICD-10-CM

## 2021-02-17 DIAGNOSIS — N94.10 DYSPAREUNIA, FEMALE: ICD-10-CM

## 2021-02-17 DIAGNOSIS — M79.10 MUSCLE PAIN: ICD-10-CM

## 2021-02-17 PROCEDURE — 97140 MANUAL THERAPY 1/> REGIONS: CPT

## 2021-02-17 PROCEDURE — 97110 THERAPEUTIC EXERCISES: CPT

## 2021-02-17 NOTE — PROGRESS NOTES
Daily Note     Today's date: 2021  Patient name: Marshal Montoya  : 1970  MRN: [id number]  Referring provider: Ivon Marion DO  Dx:   Encounter Diagnosis     ICD-10-CM    1  Chronic right-sided low back pain without sciatica  M54 5     G89 29    2  Neck pain  M54 2    3  Muscle pain  M79 10    4  Pelvic pressure in female  R10 2    5  Dyspareunia, female  N94 10        Start Time: 1330  Stop Time: 1400  Total time in clinic (min): 30 minutes    Subjective: Patient noted her lumbar spine and SI feel very stiff  Patient noted she also started to drink more water and noted increased GI impairments  Patient noted she is up to 17 mins on the elliptical        Objective: See treatment diary below      Assessment: Session was limited due to time constraints  Patient performed cat/cow stretch, frog stretch with diaphragmatic breathing and adductor stretch to assist c pain  PT performed mobilizations to the lumbar spine, SI and hips to assist c pain  Patient noted improvements post session  Patient would benefit from continued PT to allow the patient to return to her PLOF  Plan: Continue per plan of care        Precautions: interstitial cystitis, hashimoto's, prolapse      Manuals 10/15 10/22 10/29 11/5 11/19 12/3 1/7 1/20 2/4 2/17   Sub-occipital release MW MW MW MW         Re-eval MW            Hip flexor STM  MW    Piriformis STM  MW Piriformis, glute med and hip flexors  MW   erector spine and QL  MW   Lumbar PA, pelvic rocking, SI PA   MW  Grade  III     MW  grade  III  MW  grade  III   Hip posterior and inferior mob       MW  grade  III self  postmob  Grade II                  Neuro Re-Ed     Chin tuck 2x10  5" hold 2x10  5" hold 2x10  5"hold          Scapular retraction 2x10  5" hold 2x10  5" hold 2x10  5" hold          Serratus punch nv 2x10  4" lower 2x10  4" lower          TA in quad nv x10  3" hold 2x10  3" hold kegel  x10  3" hold  x10  3" hold  trio                                 HEP educatoion     MW MW MW MW HEP, anatomy, positioning, progression  MW HEP  MW   Hip hikes       2x10 ea  firm 2x10 ea     3 way hip       2x10ea 2x10 ea     clams       2x10 ea                                                                                                 Ther Ex    UT stretch 5x15"ea 5x15" ea           Levator scap stretch 5x15" ea 5x15" ea           Hip add 2x10  5" hold 2x10  5" hold    + TA  x10  3" hold       Hip abd 2x10  5" hold  GTB 2x10  5" hold  GTB 2x10  5" hold  BTB sidelying  x10 ea  standing  x15ea       glute squeeze 2x10  5" hold 2x10  5" hold    Hip extension standing  x15 ea       Half butterfly    5x15"ea 5x15" ea  whole        Piriformis stretch (pulling towards)    5x15" ea 5x15" ea        Hip adductor stretch standing          3x15" ea   Frog stretch          3x15"   Cat/cow in standing          x5 ea                             Elipitical        5'     Ther Activity                              Gait Training                              Modalities

## 2021-02-18 ENCOUNTER — APPOINTMENT (OUTPATIENT)
Dept: PHYSICAL THERAPY | Facility: CLINIC | Age: 51
End: 2021-02-18
Payer: COMMERCIAL

## 2021-03-04 ENCOUNTER — APPOINTMENT (OUTPATIENT)
Dept: PHYSICAL THERAPY | Facility: CLINIC | Age: 51
End: 2021-03-04
Payer: COMMERCIAL

## 2021-03-25 ENCOUNTER — OFFICE VISIT (OUTPATIENT)
Dept: PHYSICAL THERAPY | Facility: CLINIC | Age: 51
End: 2021-03-25
Payer: COMMERCIAL

## 2021-03-25 DIAGNOSIS — M79.10 MUSCLE PAIN: ICD-10-CM

## 2021-03-25 DIAGNOSIS — M54.50 CHRONIC RIGHT-SIDED LOW BACK PAIN WITHOUT SCIATICA: Primary | ICD-10-CM

## 2021-03-25 DIAGNOSIS — G89.29 CHRONIC RIGHT-SIDED LOW BACK PAIN WITHOUT SCIATICA: Primary | ICD-10-CM

## 2021-03-25 DIAGNOSIS — M54.2 NECK PAIN: ICD-10-CM

## 2021-03-25 DIAGNOSIS — R10.2 PELVIC PRESSURE IN FEMALE: ICD-10-CM

## 2021-03-25 DIAGNOSIS — N94.10 DYSPAREUNIA IN FEMALE: ICD-10-CM

## 2021-03-25 PROCEDURE — 97112 NEUROMUSCULAR REEDUCATION: CPT

## 2021-03-25 NOTE — PROGRESS NOTES
Daily Note     Today's date: 3/25/2021  Patient name: Jhonatan Escamilla  : 1970  MRN: [id number]  Referring provider: Nancy Miranda DO  Dx:   Encounter Diagnosis     ICD-10-CM    1  Chronic right-sided low back pain without sciatica  M54 5     G89 29    2  Neck pain  M54 2    3  Muscle pain  M79 10    4  Pelvic pressure in female  R10 2    5  Dyspareunia in female  N94 10        Start Time: 1000  Stop Time: 1045  Total time in clinic (min): 45 minutes    Subjective: Patient noted she is up to 20 mins of cardio without pain as well as performing her HEP every other day  Patient had a coloscopy and had a UTI a few days later  Patient noted since her UTI she has had pelvic pain and finds it difficult to urinate  Objective: See treatment diary below      Assessment: PT educated the patient on bathroom mechanics as well as breathing techniques  PT also educated the patient on when to perform kegels  Patient had questions on a few exercises and the PT educated the patient on form  Patient would benefit from continued PT to allow the patient to return to her PLOF  Plan: Continue per plan of care  Patient will follow up on a monthly basis        Precautions: interstitial cystitis, hashimoto's, prolapse      Manuals  10/22 10/29 11/5 11/19 12/3 1/7 1/20 2/4 2/17   Sub-occipital release  MW MW MW         Re-eval             Hip flexor STM  MW    Piriformis STM  MW Piriformis, glute med and hip flexors  MW   erector spine and QL  MW   Lumbar PA, pelvic rocking, SI PA   MW  Grade  III     MW  grade  III  MW  grade  III   Hip posterior and inferior mob       MW  grade  III self  postmob  Grade II                  Neuro Re-Ed     Chin tuck  2x10  5" hold 2x10  5"hold          Scapular retraction  2x10  5" hold 2x10  5" hold          Serratus punch  2x10  4" lower 2x10  4" lower          TA in quad  x10  3" hold 2x10  3" hold kegel  x10  3" hold  x10  3" hold  trio                                 HEP educatoion HEP edu, bathroom mechanics, breathing and kegels       MW MW MW MW HEP, anatomy, positioning, progression  MW HEP  MW   Hip hikes       2x10 ea  firm 2x10 ea     3 way hip       2x10ea 2x10 ea     clams       2x10 ea                                                                                                 Ther Ex    UT stretch  5x15" ea           Levator scap stretch  5x15" ea           Hip add  2x10  5" hold    + TA  x10  3" hold       Hip abd  2x10  5" hold  GTB 2x10  5" hold  BTB sidelying  x10 ea  standing  x15ea       glute squeeze  2x10  5" hold    Hip extension standing  x15 ea       Half butterfly    5x15"ea 5x15" ea  whole        Piriformis stretch (pulling towards)    5x15" ea 5x15" ea        Hip adductor stretch standing          3x15" ea   Frog stretch          3x15"   Cat/cow in standing          x5 ea                             Elipitical        5'     Ther Activity                              Gait Training                              Modalities

## 2021-04-08 ENCOUNTER — TELEPHONE (OUTPATIENT)
Dept: GYNECOLOGY | Facility: CLINIC | Age: 51
End: 2021-04-08

## 2021-04-08 NOTE — TELEPHONE ENCOUNTER
Pt called and stated she sees Dr Brittney Herring for her bio identical hormone therapy and she has been experiencing some spotting as well as breast pain and he advised patient to call us to set up an U/S to check uterine thickness    Would you want to do a SIS or just a TVS? We tentatively put her on for 04/14/21

## 2021-04-14 ENCOUNTER — OFFICE VISIT (OUTPATIENT)
Dept: GYNECOLOGY | Facility: CLINIC | Age: 51
End: 2021-04-14
Payer: COMMERCIAL

## 2021-04-14 ENCOUNTER — ULTRASOUND (OUTPATIENT)
Dept: GYNECOLOGY | Facility: CLINIC | Age: 51
End: 2021-04-14
Payer: COMMERCIAL

## 2021-04-14 DIAGNOSIS — N95.0 PMB (POSTMENOPAUSAL BLEEDING): Primary | ICD-10-CM

## 2021-04-14 PROCEDURE — 58340 CATHETER FOR HYSTEROGRAPHY: CPT | Performed by: OBSTETRICS & GYNECOLOGY

## 2021-04-14 PROCEDURE — 88305 TISSUE EXAM BY PATHOLOGIST: CPT | Performed by: PATHOLOGY

## 2021-04-14 PROCEDURE — 58100 BIOPSY OF UTERUS LINING: CPT | Performed by: OBSTETRICS & GYNECOLOGY

## 2021-04-14 PROCEDURE — 76831 ECHO EXAM UTERUS: CPT | Performed by: OBSTETRICS & GYNECOLOGY

## 2021-04-14 PROCEDURE — 99212 OFFICE O/P EST SF 10 MIN: CPT | Performed by: OBSTETRICS & GYNECOLOGY

## 2021-04-14 PROCEDURE — 76830 TRANSVAGINAL US NON-OB: CPT | Performed by: OBSTETRICS & GYNECOLOGY

## 2021-04-14 NOTE — PROGRESS NOTES
Patient presently on HRT prescribed by Dr Jennifer Nayak  Has been having some irregular bleeding   Was advised to RTO TVS-SIS possible biopsy  Patient has already decreased dosage of estrogen cream after discussion with Dr Kristin PALENCIA US Pelvic Non OB   Date/Time: 4/14/2021 7:27 AM  Performed by: Stacie Boone  Authorized by: Jeaneth Bray DO   Universal Protocol:  Patient identity confirmed: verbally with patient        Procedure details:     Technique:  Transvaginal US, Non-OB    Position: lithotomy exam    Uterine findings:     Length (cm): 7 9    Height (cm):  3 95    Width (cm):  5 62    Endometrial stripe: identified      Endometrium thickness (mm):  7 2  Left ovary findings:     Left ovary:  Visualized    Length (cm): 2 35    Height (cm): 1 08    Width (cm): 1 72  Right ovary findings:     Right ovary:  Visualized    Length (cm): 2 29    Height (cm): 1 12    Width (cm): 1 26  Other findings:     Free pelvic fluid: not identified      Free peritoneal fluid: not identified    Post-Procedure Details:     Impression:  Anteverted uterus is inhomogeneous throughout without fibroids  Bilateral ovaries appear within normal limits  Tolerance:   Tolerated well, no immediate complications    Complications: no complications    Additional Procedure Comments:      Compared to ultrasound performed 1/24/2018      GE Logiq P5 transvaginal transducer E8C with Serial Number [id number] was used during procedure and subsequently cleaned with high level disinfection utilizing the [employer]       Ultrasound performed at:   [place] [address]  [address] [place] [address], [facility]  Phone: [phone]  Fax:  [phone]     Sonohysterogram   Date/Time: 4/14/2021 7:27 AM  Performed by: Jeaneth Bray DO  Authorized by: Jeaneth Bray DO   Universal Protocol:  Patient identity confirmed: verbally with patient        Pre-procedure:     Prepped with: Betadine    Procedure:     Cervix cleaned and prepped: yes      Catheter inserted: yes      Uterine cavity distended with saline: yes    Post-procedure:     Patient observed: yes      Post procedure instructions given to patient: yes      Patient tolerated procedure well with no complications: yes    Comments:      Sonohysterogram demonstrates a fairly smooth appearing endometrium  Endometrial biopsy   Date/Time: 4/14/2021 7:28 AM  Performed by: Ashlee Leone DO  Authorized by: Ashlee Leone DO   Universal Protocol:  Patient identity confirmed: verbally with patient        Procedure:     Procedure: endometrial biopsy with Pipelle      A bivalve speculum was placed in the vagina: yes      Cervix cleaned and prepped: yes      Specimen collected: specimen collected and sent to pathology      Patient tolerated procedure well with no complications: yes      IMP: PMB    Plan:  Would recommend decreasing the estrogen cream or increasing the oral progesterone  The patient is already decrease the estrogen cream   We will see how she does at this dosage

## 2021-04-14 NOTE — PROGRESS NOTES
AMB US Pelvic Non OB    Date/Time: 4/14/2021 7:27 AM  Performed by: Melanie Degroot  Authorized by: Renaldo Sanabria DO   Universal Protocol:  Patient identity confirmed: verbally with patient      Procedure details:     Technique:  Transvaginal US, Non-OB    Position: lithotomy exam    Uterine findings:     Length (cm): 7 9    Height (cm):  3 95    Width (cm):  5 62    Endometrial stripe: identified      Endometrium thickness (mm):  7 2  Left ovary findings:     Left ovary:  Visualized    Length (cm): 2 35    Height (cm): 1 08    Width (cm): 1 72  Right ovary findings:     Right ovary:  Visualized    Length (cm): 2 29    Height (cm): 1 12    Width (cm): 1 26  Other findings:     Free pelvic fluid: not identified      Free peritoneal fluid: not identified    Post-Procedure Details:     Impression:  Anteverted uterus is inhomogeneous throughout without fibroids  Bilateral ovaries appear within normal limits  Tolerance: Tolerated well, no immediate complications    Complications: no complications    Additional Procedure Comments:      Compared to ultrasound performed 1/24/2018  [employer] P5 transvaginal transducer E8C with Serial Number [id number] was used during procedure and subsequently cleaned with high level disinfection utilizing the [employer]       Ultrasound performed at:     [facility]  [address] [facility]  [address], [address] [facility]  Phone: [phone]  Fax:  [phone]    Sonohysterogram    Date/Time: 4/14/2021 7:27 AM  Performed by: Renaldo Sanabria DO  Authorized by: Renaldo Sanabria DO   Universal Protocol:  Patient identity confirmed: verbally with patient      Pre-procedure:     Prepped with: Betadine    Procedure:     Cervix cleaned and prepped: yes      Catheter inserted: yes      Uterine cavity distended with saline: yes    Post-procedure:     Patient observed: yes      Post procedure instructions given to patient: yes Patient tolerated procedure well with no complications: yes    Comments:      Sonohysterogram demonstrates a fairly smooth appearing endometrium     Endometrial biopsy    Date/Time: 4/14/2021 7:28 AM  Performed by: Octaviano Trevino DO  Authorized by: Octaviano Trevino DO   Universal Protocol:  Patient identity confirmed: verbally with patient      Procedure:     Procedure: endometrial biopsy with Pipelle      A bivalve speculum was placed in the vagina: yes      Cervix cleaned and prepped: yes      Specimen collected: specimen collected and sent to pathology      Patient tolerated procedure well with no complications: yes

## 2021-04-15 ENCOUNTER — OFFICE VISIT (OUTPATIENT)
Dept: PHYSICAL THERAPY | Facility: CLINIC | Age: 51
End: 2021-04-15
Payer: COMMERCIAL

## 2021-04-15 DIAGNOSIS — N94.10 DYSPAREUNIA IN FEMALE: ICD-10-CM

## 2021-04-15 DIAGNOSIS — M54.2 NECK PAIN: ICD-10-CM

## 2021-04-15 DIAGNOSIS — M54.50 CHRONIC RIGHT-SIDED LOW BACK PAIN WITHOUT SCIATICA: Primary | ICD-10-CM

## 2021-04-15 DIAGNOSIS — R10.2 PELVIC PRESSURE IN FEMALE: ICD-10-CM

## 2021-04-15 DIAGNOSIS — G89.29 CHRONIC RIGHT-SIDED LOW BACK PAIN WITHOUT SCIATICA: Primary | ICD-10-CM

## 2021-04-15 DIAGNOSIS — M79.10 MUSCLE PAIN: ICD-10-CM

## 2021-04-15 PROCEDURE — 97140 MANUAL THERAPY 1/> REGIONS: CPT

## 2021-04-15 PROCEDURE — 97112 NEUROMUSCULAR REEDUCATION: CPT

## 2021-04-15 PROCEDURE — 97110 THERAPEUTIC EXERCISES: CPT

## 2021-04-15 NOTE — PROGRESS NOTES
Daily Note     Today's date: 4/15/2021  Patient name: Jhonathan Don  : 1970  MRN: [id number]  Referring provider: Adebayo Witt DO  Dx:   Encounter Diagnosis     ICD-10-CM    1  Chronic right-sided low back pain without sciatica  M54 5     G89 29    2  Neck pain  M54 2    3  Muscle pain  M79 10    4  Pelvic pressure in female  R10 2    5  Dyspareunia in female  N94 10        Start Time: 09  Stop Time: 102  Total time in clinic (min): 55 minutes    Subjective: Patient noted that she has been stable, however she had a IC flare over the past week  Patient noted this past week she had a cystoscopy and uterine lining biopsy and is in a lot of pain  Patient noted her sacrum is also acting up  Objective: See treatment diary below      Assessment: PT introduced cat/camels into her exercise routine as well as child pose to assist c sacrum mobility  Patient noted improvements post manuals and exercise  PT educated the patient on her diet, using a foam roll at home and re-introducing kegels and TA exercises to assist c pain levels  Although patient is improving, her PMHx continues to limit her progress  Patient would benefit from continued PT to allow the patient to return to her PLOF  Plan: Continue per plan of care        Precautions: interstitial cystitis, hashimoto's, prolapse      Manuals 3/25 4/15  11/5 11/19 12/3 1/7 1/20 2/4 2/17   Sub-occipital release    MW         Re-eval             Hip flexor STM      Piriformis STM  MW Piriformis, glute med and hip flexors  MW   erector spine and QL  MW   Lumbar PA, pelvic rocking, SI PA  MW  Grade  III      MW  grade  III  MW  grade  III   Hip posterior and inferior mob  Self mob edu     MW  grade  III self  postmob  Grade II                  Neuro Re-Ed     Chin tuck             Scapular retraction             Serratus punch             TA in quad    kegel  x10  3" hold  x10  3" hold  trio                                 HEP educatoion HEP edu, bathroom mechanics, breathing and kegels    HEP edu, foam rolling and diet   MW MW MW MW HEP, anatomy, positioning, progression  MW HEP  MW   Hip hikes       2x10 ea  firm 2x10 ea     3 way hip       2x10ea 2x10 ea     clams       2x10 ea                                                                                                 Ther Ex    UT stretch             Levator scap stretch             Hip add      + TA  x10  3" hold       Hip abd    sidelying  x10 ea  standing  x15ea       glute squeeze      Hip extension standing  x15 ea       Half butterfly    5x15"ea 5x15" ea  whole        Piriformis stretch (pulling towards)    5x15" ea 5x15" ea        Hip adductor stretch standing          3x15" ea   Frog stretch          3x15"   Cat/cow in standing  Quad cat/camel  x10        x5 ea   Child pose (forward, diagonal)  x5 ea                        Elipitical        5'     Ther Activity                              Gait Training                              Modalities

## 2021-05-03 ENCOUNTER — TELEPHONE (OUTPATIENT)
Dept: OBGYN CLINIC | Facility: CLINIC | Age: 51
End: 2021-05-03

## 2021-05-03 DIAGNOSIS — N64.4 BREAST PAIN: Primary | ICD-10-CM

## 2021-05-03 NOTE — TELEPHONE ENCOUNTER
Patient called to state she is still experiencing breast pain even after lowering her bio estrogen  She's asking if she should go for a diagnostic mammogram to have it assessed

## 2021-05-11 ENCOUNTER — HOSPITAL ENCOUNTER (OUTPATIENT)
Dept: ULTRASOUND IMAGING | Facility: CLINIC | Age: 51
Discharge: HOME | End: 2021-05-11
Payer: COMMERCIAL

## 2021-05-11 ENCOUNTER — HOSPITAL ENCOUNTER (OUTPATIENT)
Dept: MAMMOGRAPHY | Facility: CLINIC | Age: 51
Discharge: HOME | End: 2021-05-11
Payer: COMMERCIAL

## 2021-05-11 VITALS — HEIGHT: 65 IN | WEIGHT: 160 LBS | BODY MASS INDEX: 26.66 KG/M2

## 2021-05-11 DIAGNOSIS — N64.4 BREAST PAIN: ICD-10-CM

## 2021-05-11 PROCEDURE — 77066 DX MAMMO INCL CAD BI: CPT

## 2021-05-11 PROCEDURE — G0279 TOMOSYNTHESIS, MAMMO: HCPCS

## 2021-05-13 ENCOUNTER — TRANSCRIBE ORDERS (OUTPATIENT)
Dept: PHYSICAL THERAPY | Facility: CLINIC | Age: 51
End: 2021-05-13

## 2021-05-13 ENCOUNTER — OFFICE VISIT (OUTPATIENT)
Dept: PHYSICAL THERAPY | Facility: CLINIC | Age: 51
End: 2021-05-13
Payer: COMMERCIAL

## 2021-05-13 DIAGNOSIS — M79.10 MUSCLE PAIN: ICD-10-CM

## 2021-05-13 DIAGNOSIS — R10.2 PELVIC PRESSURE IN FEMALE: ICD-10-CM

## 2021-05-13 DIAGNOSIS — M54.2 NECK PAIN: Primary | ICD-10-CM

## 2021-05-13 DIAGNOSIS — N94.10 DYSPAREUNIA IN FEMALE: ICD-10-CM

## 2021-05-13 DIAGNOSIS — M54.2 NECK PAIN: ICD-10-CM

## 2021-05-13 DIAGNOSIS — M54.50 CHRONIC RIGHT-SIDED LOW BACK PAIN WITHOUT SCIATICA: Primary | ICD-10-CM

## 2021-05-13 DIAGNOSIS — G89.29 CHRONIC RIGHT-SIDED LOW BACK PAIN WITHOUT SCIATICA: Primary | ICD-10-CM

## 2021-05-13 PROCEDURE — 97164 PT RE-EVAL EST PLAN CARE: CPT

## 2021-05-13 PROCEDURE — 97112 NEUROMUSCULAR REEDUCATION: CPT

## 2021-05-13 PROCEDURE — 97140 MANUAL THERAPY 1/> REGIONS: CPT

## 2021-05-13 NOTE — PROGRESS NOTES
PT Re-Evaluation     Today's date: 2021  Patient name: Yaw Bernal  : 1970  MRN: [id number]  Referring provider: Belgica Marie DO  Dx:   Encounter Diagnosis     ICD-10-CM    1  Chronic right-sided low back pain without sciatica  M54 5     G89 29    2  Neck pain  M54 2    3  Muscle pain  M79 10    4  Pelvic pressure in female  R10 2    5  Dyspareunia in female  N94 10        Start Time: 0900  Stop Time: 1000  Total time in clinic (min): 60 minutes    Assessment  Assessment details: Jesus Urena is a 50 yo female presenting to her one month follow up to address her pelvic floor dysfunction, lumbar spine and sacral pain that has been ongoing for over 9 months  Since last re-evaluation the patient has progressed with hip and core strength, a decrease in pain from a 7/10 to a 4/10 and she feels about 70% back to her PLOF when compared to 50% at last re-evaluation  Majority of the sessions over the past few months have focused on the back, however moving forward the patient's PT sessions will focus more on the pelvic floor  Patient continues to have good and bad days, however endurance and pelvic pain continue to be an issue  PT will continue to address the noted impairments by performing hip, core and pelvic floor strengthening, stretching, balance, functional activities and manual techniques to allow the patient to return to her PLOF  PT recommended 1x/month for 3-4 months c a good  prognosis 2* noted improvements  Impairments: abnormal muscle firing, abnormal or restricted ROM, activity intolerance, impaired physical strength, lacks appropriate home exercise program, pain with function, poor posture  and poor body mechanics    Symptom irritability: moderateUnderstanding of Dx/Px/POC: good   Prognosis: good    Goals  STG: In four weeks the patient will:    1  Be (I) with her HEP  (75% MET)  2   Increase hip and core strength to 4+/5 MMT score to assist c functional activities (MET)  3  Complete daily voiding and bowel log  (MET)  4  Perform x10 diaphragmatic breathes without cues  (MET)  5  Perform x 10 Kegels with a 4-5 second hold with proper breathing and relaxation of the pelvic floor muscles  (MET)      LTG: In six weeks, the patient will:    1  Perform x10 TA contractions with proper activation and no cues provided by PT  (MET)  2  Void without straining in the proper body mechanics  (MET)  3  tolerating speculum for well woman exam with minimal to no pain post  (MET)  4  Increase hip and core strength to 5/5 MMT score to assist c prolonged activities  (in progress)    New goals: in four weeks the patient will:  1  Chop an onion with propers TA and PF contraction and require no cues for breathing by PT  (70% MET)  2  Ascend and descend the stairs with TA activation  (70% MET)  3  Perform a floor transfer with proper form and TA activation  (70% MET)  4  Sleep 6-8 hours with minimal to no pain in the lumbar spine  (70% MET)  5  Milly Hodges the lawn without pain in the back  (in progress)        Plan  Patient would benefit from: skilled physical therapy and women's health eval  Referral necessary: No  Planned modality interventions: cryotherapy, thermotherapy: hydrocollator packs and biofeedback  Planned therapy interventions: abdominal trunk stabilization, joint mobilization, manual therapy, massage, Jack taping, neuromuscular re-education, patient education, postural training, strengthening, stretching, therapeutic activities, therapeutic exercise, transfer training, home exercise program, functional ROM exercises, flexibility, breathing training and body mechanics training  Frequency: 1x month  Duration in visits: 11  Plan of Care beginning date: 11/26/2021  Plan of Care expiration date: 8/31/2022  Treatment plan discussed with: patient        Subjective Evaluation    History of Present Illness  Mechanism of injury: Patient noted she is 70% back to her PLOF   Patient noted in general her hips and lumbar spine have improved in function and pain  Patient noted she is able to do more at home and in the community  She noted she does have good and bad days, however her average pain is about a 4/10 when compared to a 7/10 previously  Patient noted she is ready to focus on the pelvic floor again             Recurrent probem    Quality of life: good    Pain  Current pain ratin  At best pain ratin  At worst pain ratin  Location: pelvic and lumbar spine, neck pain  Quality: dull ache and pulling  Relieving factors: rest  Aggravating factors: sitting, walking, stair climbing and lifting  Progression: improved    Social Support    Employment status: not working  Hand dominance: right  Life stress: 5 children to take care of     Patient Goals  Patient goals for therapy: increased strength, independence with ADLs/IADLs, return to sport/leisure activities, increased motion and decreased pain  Patient goal: "to have less pain with well women exam " (MET) "to sleep without pain " (in progress) "to mow the lawn without pain " (in progress)        Objective     Active Range of Motion   Cervical/Thoracic Spine       Cervical    Flexion:  with pain Restriction level: moderate  Extension:  Restriction level: minimal  Left lateral flexion:  Restriction level: moderate  Right lateral flexion:  Restriction level moderate  Left rotation:  Restriction level: minimal  Right rotation:  Restriction level: minimal    Lumbar   Flexion:  Restriction level: minimal  Extension:  Restriction level: minimal  Left lateral flexion:  Restriction level: minimal  Right lateral flexion:  Restriction level: minimal  Left rotation:  Restriction level: minimal  Right rotation:  Restriction level: minimal    Strength/Myotome Testing   Cervical Spine     Left   Neck lateral flexion (C3): 3+    Right   Neck lateral flexion (C3): 3+    Left Shoulder     Planes of Motion   Flexion: 4   Extension: 4   Abduction: 4   External rotation at 0°: 4-   Internal rotation at 0°: 4-     Right Shoulder     Planes of Motion   Flexion: 4   Extension: 4   Abduction: 4   External rotation at 0°: 4-   Internal rotation at 0°: 4-     Left Elbow   Flexion: 4+  Extension: 4+    Right Elbow   Flexion: 4+  Extension: 4+    Left Wrist/Hand   Wrist extension: 4+  Wrist flexion: 4+    Right Wrist/Hand   Wrist extension: 4+  Wrist flexion: 4+    Left Hip   Planes of Motion   Flexion: 4+  Abduction: 4+  Adduction: 4+  External rotation: 4+  Internal rotation: 4+    Right Hip   Planes of Motion   Flexion: 4+  Abduction: 4+  Adduction: 4+  External rotation: 4+  Internal rotation: 4+  Pelvic Floor Exam     General Perineum Exam:   perineum intact  Negative for no pelvic organ prolapse at rest and perianal erythema    Visual Inspection of Perineum:   Excursion of perineal body in cephalad direction with contraction of pelvic floor muscles (PFM): fair  and good  Excursion of perineal body in caudal direction with relaxation of pelvic floor muscles (PFM): fair  and good   Involuntary relaxation with bearing down: no  Cotton swab test: non-tender  Cough reflex: no cough reflex  Sphincter Tone Resting: normal  Sphincter Tone Squeeze: weak  Sensation: intact  Tenderness: unprovoked    Pelvic Organ Prolapse no pelvic organ prolapse at rest    Pelvic Floor Muscle Exam     Palpation   Minimal increased muscle tension in the pubococcygeus and iliococcygeus  Minimal tenderness on right in the pubococcygeus and iliococcygeus  No tenderness on left in the pubococcygeus and iliococcygeus    Muscle Contraction: substitution and mild  Breathing pattern with contraction: within normal limits and diaphragmatic  Pelvic floor muscle relaxation is complete  2 seconds required for complete relaxation       PERFECT Score   Power right: 3/5 (Did not assess at this treatment session )  Power left: 3/5 (Did not assess at this treatment session)  Endurance (seconds to max): 4  Repetitions (before fatigue): 3  Fast flicks (in 10 seconds): 8    SMEG Biofeedback   to be assessed next treatment  Sensor used: external sensors placed perianally  Positioning: sitting  Holding ability: poor and fair  Derecruitment: fairelevated and low  Biofeedback comments: Seated: rest: 0- 5; contraction: 7 mV  Supine: rest: 5 mV; contraction: 40mv          Precautions: interstitial cystitis, hashimoto's, prolapse      Manuals 3/25 4/15 5/13   12/3 1/7 1/20 2/4 2/17   Sub-occipital release             Re-eval             Hip flexor STM      Piriformis STM  MW Piriformis, glute med and hip flexors  MW   erector spine and QL  MW   Lumbar PA, pelvic rocking, SI PA  MW  Grade  III MW  Grade  III     MW  grade  III  MW  grade  III   Hip posterior and inferior mob  Self mob edu     MW  grade  III self  postmob  Grade II     Re-eval   MW          Neuro Re-Ed     Chin tuck             Scapular retraction             Serratus punch             TA in quad      x10  3" hold  trio                                 HEP educatoion HEP edu, bathroom mechanics, breathing and kegels    HEP edu, foam rolling and diet HEP edu   MW MW MW HEP, anatomy, positioning, progression  MW HEP  MW   Hip hikes       2x10 ea  firm 2x10 ea     3 way hip       2x10ea 2x10 ea     clams       2x10 ea      TA + marching   x10 ea          TA + fallouts   x10 ea          External anal sphinter   x15  5" hold                                                              Ther Ex    UT stretch             Levator scap stretch             Hip add      + TA  x10  3" hold       Hip abd      standing  x15ea       glute squeeze      Hip extension standing  x15 ea       Half butterfly             Piriformis stretch (pulling towards)             Hip adductor stretch standing          3x15" ea   Frog stretch          3x15"   Cat/cow in standing  Quad cat/camel  x10        x5 ea   Child pose (forward, diagonal)  x5 ea                        Elipitical        5' Ther Activity                              Gait Training                              Modalities

## 2021-05-13 NOTE — PROGRESS NOTES
Daily Note     Today's date: 2021  Patient name: Yaw Bernal  : 1970  MRN: [id number]  Referring provider: Belgica Marie DO  Dx:   Encounter Diagnosis     ICD-10-CM    1  Chronic right-sided low back pain without sciatica  M54 5     G89 29    2  Neck pain  M54 2    3  Muscle pain  M79 10    4  Pelvic pressure in female  R10 2    5  Dyspareunia in female  N94 10                   Subjective: ***      Objective: See treatment diary below      Assessment: Tolerated treatment {Tolerated treatment :0572518639}  Patient {assessment:4800420256}      Plan: {PLAN:[id number]}     Precautions: interstitial cystitis, hashimoto's, prolapse      Manuals 3/25 4/15  11/5 11/19 12/3 1/7 1/20 2/4 2/17   Sub-occipital release    MW         Re-eval             Hip flexor STM      Piriformis STM  MW Piriformis, glute med and hip flexors  MW   erector spine and QL  MW   Lumbar PA, pelvic rocking, SI PA  MW  Grade  III      MW  grade  III  MW  grade  III   Hip posterior and inferior mob  Self mob edu     MW  grade  III self  postmob  Grade II                  Neuro Re-Ed     Chin tuck             Scapular retraction             Serratus punch             TA in quad    kegel  x10  3" hold  x10  3" hold  trio                                 HEP educatoion HEP edu, bathroom mechanics, breathing and kegels    HEP edu, foam rolling and diet   MW MW MW MW HEP, anatomy, positioning, progression  MW HEP  MW   Hip hikes       2x10 ea  firm 2x10 ea     3 way hip       2x10ea 2x10 ea     clams       2x10 ea                                                                                                 Ther Ex    UT stretch             Levator scap stretch             Hip add      + TA  x10  3" hold       Hip abd    sidelying  x10 ea  standing  x15ea       glute squeeze      Hip extension standing  x15 ea       Half butterfly    5x15"ea 5x15" ea  whole        Piriformis stretch (pulling towards)    5x15" ea 5x15" ea        Hip adductor stretch standing          3x15" ea   Frog stretch          3x15"   Cat/cow in standing  Quad cat/camel  x10        x5 ea   Child pose (forward, diagonal)  x5 ea                        Elipitical        5'     Ther Activity                              Gait Training                              Modalities

## 2021-05-13 NOTE — LETTER
May 13, 2021    Nellie Matson DO  [facility]  [address]    Patient: Scott Paredes   YOB: 1970   Date of Visit: 2021     Encounter Diagnosis     ICD-10-CM    1  Chronic right-sided low back pain without sciatica  M54 5     G89 29    2  Neck pain  M54 2    3  Muscle pain  M79 10    4  Pelvic pressure in female  R10 2    5  Dyspareunia in female  N94 10        Dear Dr Glinda Oppenheim: Thank you for your recent referral of Scott Paredes  Please review the attached evaluation summary from Zoraida's recent visit  Please verify that you agree with the plan of care by signing the attached order  If you have any questions or concerns, please do not hesitate to call  I sincerely appreciate the opportunity to share in the care of one of your patients and hope to have another opportunity to work with you in the near future  Sincerely,    Darby Downey, PT      Referring Provider:      I certify that I have read the below Plan of Care and certify the need for these services furnished under this plan of treatment while under my care  Nellie Matson DO  [facility] [address]  Via Fax: [phone]          PT Re-Evaluation     Today's date: 2021  Patient name: Scott Paredes  : 1970  MRN: [id number]  Referring provider: Leretha Blizzard, DO  Dx:   Encounter Diagnosis     ICD-10-CM    1  Chronic right-sided low back pain without sciatica  M54 5     G89 29    2  Neck pain  M54 2    3  Muscle pain  M79 10    4  Pelvic pressure in female  R10 2    5  Dyspareunia in female  N94 10        Start Time: 0900  Stop Time: 1000  Total time in clinic (min): 60 minutes    Assessment  Assessment details: Santana Miranda is a 50 yo female presenting to her one month follow up to address her pelvic floor dysfunction, lumbar spine and sacral pain that has been ongoing for over 9 months   Since last re-evaluation the patient has progressed with hip and core strength, a decrease in pain from a 7/10 to a 4/10 and she feels about 70% back to her PLOF when compared to 50% at last re-evaluation  Majority of the sessions over the past few months have focused on the back, however moving forward the patient's PT sessions will focus more on the pelvic floor  Patient continues to have good and bad days, however endurance and pelvic pain continue to be an issue  PT will continue to address the noted impairments by performing hip, core and pelvic floor strengthening, stretching, balance, functional activities and manual techniques to allow the patient to return to her PLOF  PT recommended 1x/month for 3-4 months c a good  prognosis 2* noted improvements  Impairments: abnormal muscle firing, abnormal or restricted ROM, activity intolerance, impaired physical strength, lacks appropriate home exercise program, pain with function, poor posture  and poor body mechanics    Symptom irritability: moderateUnderstanding of Dx/Px/POC: good   Prognosis: good    Goals  STG: In four weeks the patient will:    1  Be (I) with her HEP  (75% MET)  2  Increase hip and core strength to 4+/5 MMT score to assist c functional activities (MET)  3  Complete daily voiding and bowel log  (MET)  4  Perform x10 diaphragmatic breathes without cues  (MET)  5  Perform x 10 Kegels with a 4-5 second hold with proper breathing and relaxation of the pelvic floor muscles  (MET)      LTG: In six weeks, the patient will:    1  Perform x10 TA contractions with proper activation and no cues provided by PT  (MET)  2  Void without straining in the proper body mechanics  (MET)  3  tolerating speculum for well woman exam with minimal to no pain post  (MET)  4  Increase hip and core strength to 5/5 MMT score to assist c prolonged activities  (in progress)    New goals: in four weeks the patient will:  1   Chop an onion with propers TA and PF contraction and require no cues for breathing by PT  (70% MET)  2  Ascend and descend the stairs with TA activation  (70% MET)  3  Perform a floor transfer with proper form and TA activation  (70% MET)  4  Sleep 6-8 hours with minimal to no pain in the lumbar spine  (70% MET)  5   Garsia the lawn without pain in the back  (in progress)        Plan  Patient would benefit from: skilled physical therapy and women's health eval  Referral necessary: No  Planned modality interventions: cryotherapy, thermotherapy: hydrocollator packs and biofeedback  Planned therapy interventions: abdominal trunk stabilization, joint mobilization, manual therapy, massage, Jack taping, neuromuscular re-education, patient education, postural training, strengthening, stretching, therapeutic activities, therapeutic exercise, transfer training, home exercise program, functional ROM exercises, flexibility, breathing training and body mechanics training  Frequency: 1x month  Duration in visits: 11  Plan of Care beginning date: 2021  Plan of Care expiration date: 2022  Treatment plan discussed with: patient        Subjective Evaluation    History of Present Illness  Mechanism of injury: Patient noted she is 70% back to her PLOF  Patient noted in general her hips and lumbar spine have improved in function and pain  Patient noted she is able to do more at home and in the community  She noted she does have good and bad days, however her average pain is about a 4/10 when compared to a 7/10 previously  Patient noted she is ready to focus on the pelvic floor again             Recurrent probem    Quality of life: good    Pain  Current pain ratin  At best pain ratin  At worst pain ratin  Location: pelvic and lumbar spine, neck pain  Quality: dull ache and pulling  Relieving factors: rest  Aggravating factors: sitting, walking, stair climbing and lifting  Progression: improved    Social Support    Employment status: not working  Hand dominance: right  Life stress: 5 children to take care of     Patient Goals  Patient goals for therapy: increased strength, independence with ADLs/IADLs, return to sport/leisure activities, increased motion and decreased pain  Patient goal: "to have less pain with well women exam " (MET) "to sleep without pain " (in progress) "to mow the lawn without pain " (in progress)        Objective     Active Range of Motion   Cervical/Thoracic Spine       Cervical    Flexion:  with pain Restriction level: moderate  Extension:  Restriction level: minimal  Left lateral flexion:  Restriction level: moderate  Right lateral flexion:  Restriction level moderate  Left rotation:  Restriction level: minimal  Right rotation:  Restriction level: minimal    Lumbar   Flexion:  Restriction level: minimal  Extension:  Restriction level: minimal  Left lateral flexion:  Restriction level: minimal  Right lateral flexion:  Restriction level: minimal  Left rotation:  Restriction level: minimal  Right rotation:  Restriction level: minimal    Strength/Myotome Testing   Cervical Spine     Left   Neck lateral flexion (C3): 3+    Right   Neck lateral flexion (C3): 3+    Left Shoulder     Planes of Motion   Flexion: 4   Extension: 4   Abduction: 4   External rotation at 0°: 4-   Internal rotation at 0°: 4-     Right Shoulder     Planes of Motion   Flexion: 4   Extension: 4   Abduction: 4   External rotation at 0°: 4-   Internal rotation at 0°: 4-     Left Elbow   Flexion: 4+  Extension: 4+    Right Elbow   Flexion: 4+  Extension: 4+    Left Wrist/Hand   Wrist extension: 4+  Wrist flexion: 4+    Right Wrist/Hand   Wrist extension: 4+  Wrist flexion: 4+    Left Hip   Planes of Motion   Flexion: 4+  Abduction: 4+  Adduction: 4+  External rotation: 4+  Internal rotation: 4+    Right Hip   Planes of Motion   Flexion: 4+  Abduction: 4+  Adduction: 4+  External rotation: 4+  Internal rotation: 4+  Pelvic Floor Exam     General Perineum Exam:   perineum intact  Negative for no pelvic organ prolapse at rest and perianal erythema    Visual Inspection of Perineum:   Excursion of perineal body in cephalad direction with contraction of pelvic floor muscles (PFM): fair  and good  Excursion of perineal body in caudal direction with relaxation of pelvic floor muscles (PFM): fair  and good   Involuntary relaxation with bearing down: no  Cotton swab test: non-tender  Cough reflex: no cough reflex  Sphincter Tone Resting: normal  Sphincter Tone Squeeze: weak  Sensation: intact  Tenderness: unprovoked    Pelvic Organ Prolapse no pelvic organ prolapse at rest    Pelvic Floor Muscle Exam     Palpation   Minimal increased muscle tension in the pubococcygeus and iliococcygeus  Minimal tenderness on right in the pubococcygeus and iliococcygeus  No tenderness on left in the pubococcygeus and iliococcygeus    Muscle Contraction: substitution and mild  Breathing pattern with contraction: within normal limits and diaphragmatic  Pelvic floor muscle relaxation is complete  2 seconds required for complete relaxation       PERFECT Score   Power right: 3/5 (Did not assess at this treatment session )  Power left: 3/5 (Did not assess at this treatment session)  Endurance (seconds to max): 4  Repetitions (before fatigue): 3  Fast flicks (in 10 seconds): 8    SMEG Biofeedback   to be assessed next treatment  Sensor used: external sensors placed perianally  Positioning: sitting  Holding ability: poor and fair  Derecruitment: fairelevated and low  Biofeedback comments: Seated: rest: 0- 5; contraction: 7 mV  Supine: rest: 5 mV; contraction: 40mv          Precautions: interstitial cystitis, hashimoto's, prolapse      Manuals 3/25 4/15 5/13   12/3 1/7 1/20 2/4 2/17   Sub-occipital release             Re-eval             Hip flexor STM      Piriformis STM  MW Piriformis, glute med and hip flexors  MW   erector spine and QL  MW   Lumbar PA, pelvic rocking, SI PA  MW  Grade  III MW  Grade  III MW  grade  III  MW  grade  III   Hip posterior and inferior mob  Self mob edu     MW  grade  III self  postmob  Grade II     Re-eval   MW          Neuro Re-Ed     Chin tuck             Scapular retraction             Serratus punch             TA in quad      x10  3" hold  trio                                 HEP educatoion HEP edu, bathroom mechanics, breathing and kegels    HEP edu, foam rolling and diet HEP edu   MW MW MW HEP, anatomy, positioning, progression  MW HEP  MW   Hip hikes       2x10 ea  firm 2x10 ea     3 way hip       2x10ea 2x10 ea     clams       2x10 ea      TA + marching   x10 ea          TA + fallouts   x10 ea          External anal sphinter   x15  5" hold                                                              Ther Ex    UT stretch             Levator scap stretch             Hip add      + TA  x10  3" hold       Hip abd      standing  x15ea       glute squeeze      Hip extension standing  x15 ea       Half butterfly             Piriformis stretch (pulling towards)             Hip adductor stretch standing          3x15" ea   Frog stretch          3x15"   Cat/cow in standing  Quad cat/camel  x10        x5 ea   Child pose (forward, diagonal)  x5 ea                        Elipitical        5'     Ther Activity                              Gait Training                              Modalities

## 2021-06-17 ENCOUNTER — OFFICE VISIT (OUTPATIENT)
Dept: PHYSICAL THERAPY | Facility: CLINIC | Age: 51
End: 2021-06-17
Payer: COMMERCIAL

## 2021-06-17 DIAGNOSIS — R10.2 PELVIC PRESSURE IN FEMALE: ICD-10-CM

## 2021-06-17 DIAGNOSIS — M54.2 NECK PAIN: ICD-10-CM

## 2021-06-17 DIAGNOSIS — M79.10 MUSCLE PAIN: ICD-10-CM

## 2021-06-17 DIAGNOSIS — N94.10 DYSPAREUNIA IN FEMALE: ICD-10-CM

## 2021-06-17 DIAGNOSIS — M54.50 CHRONIC RIGHT-SIDED LOW BACK PAIN WITHOUT SCIATICA: Primary | ICD-10-CM

## 2021-06-17 DIAGNOSIS — G89.29 CHRONIC RIGHT-SIDED LOW BACK PAIN WITHOUT SCIATICA: Primary | ICD-10-CM

## 2021-06-17 PROCEDURE — 97112 NEUROMUSCULAR REEDUCATION: CPT

## 2021-06-17 PROCEDURE — 97140 MANUAL THERAPY 1/> REGIONS: CPT

## 2021-06-17 NOTE — PROGRESS NOTES
Daily Note     Today's date: 2021  Patient name: Jany Easley  : 1970  MRN: [id number]  Referring provider: Lizzy Elliott DO  Dx:   Encounter Diagnosis     ICD-10-CM    1  Chronic right-sided low back pain without sciatica  M54 5     G89 29    2  Neck pain  M54 2    3  Muscle pain  M79 10    4  Pelvic pressure in female  R10 2    5  Dyspareunia in female  N94 10        Start Time: 0900  Stop Time: 953  Total time in clinic (min): 53 minutes    Subjective: Patient noted over the past month her pain has been around a 4/10 and at the highest a 7/10  Patient noted she has been taking care of her mother over the past three weeks and has not had time to herself  Patient noted last weekend she did a lot of gardening and noted increased stiffness in the lumbar spine  Objective: See treatment diary below      Assessment: PT performed lumbar PA mobilizations as well a sacral rocking to assist c stiffness that the patient has been experiencing at night  PT noted increased stiffness at L5 and the sacrum  After mobilizations and STM to the glute med/piriformis the patient noted improvements  PT educated the patient on her HEP and exercises to perform when she feels stiffness  Patient would benefit from continued PT to allow the patient to return to her PLOF  Plan: Continue per plan of care  Patient will follow up in a month        Precautions: interstitial cystitis, hashimoto's, prolapse      Manuals 3/25 4/15 5/13 6/17  12/3 1/7 1/20 2/4 2/17   Sub-occipital release             Re-eval             Hip flexor STM    glute med and piriformis  STM  MW  Piriformis STM  MW Piriformis, glute med and hip flexors  MW   erector spine and QL  MW   Lumbar PA, pelvic rocking, SI PA  MW  Grade  III MW  Grade  III MW  Grade  III    MW  grade  III  MW  grade  III   Hip posterior and inferior mob  Self mob edu     MW  grade  III self  postmob  Grade II     Re-eval   MW          Neuro Re-Ed     [employer] Scapular retraction             Serratus punch             TA in quad      x10  3" hold  trio                                 HEP educatoion HEP edu, bathroom mechanics, breathing and kegels    HEP edu, foam rolling and diet HEP edu HEP edu  MW MW MW HEP, anatomy, positioning, progression  MW HEP  MW   Hip hikes       2x10 ea  firm 2x10 ea     3 way hip       2x10ea 2x10 ea     clams       2x10 ea      TA + marching   x10 ea          TA + fallouts   x10 ea          External anal sphinter   x15  5" hold                                                              Ther Ex    UT stretch             Levator scap stretch             Hip add      + TA  x10  3" hold       Hip abd      standing  x15ea       glute squeeze      Hip extension standing  x15 ea       Half butterfly             Piriformis stretch (pulling towards)             Hip adductor stretch standing          3x15" ea   Frog stretch          3x15"   Cat/cow in standing  Quad cat/camel  x10        x5 ea   Child pose (forward, diagonal)  x5 ea                        Elipitical        5'     Ther Activity                              Gait Training                              Modalities

## 2021-07-13 ENCOUNTER — OFFICE VISIT (OUTPATIENT)
Dept: PHYSICAL THERAPY | Facility: CLINIC | Age: 51
End: 2021-07-13
Payer: COMMERCIAL

## 2021-07-13 DIAGNOSIS — G89.29 CHRONIC RIGHT-SIDED LOW BACK PAIN WITHOUT SCIATICA: Primary | ICD-10-CM

## 2021-07-13 DIAGNOSIS — N94.10 DYSPAREUNIA IN FEMALE: ICD-10-CM

## 2021-07-13 DIAGNOSIS — M54.2 NECK PAIN: ICD-10-CM

## 2021-07-13 DIAGNOSIS — R10.2 PELVIC PRESSURE IN FEMALE: ICD-10-CM

## 2021-07-13 DIAGNOSIS — M79.10 MUSCLE PAIN: ICD-10-CM

## 2021-07-13 DIAGNOSIS — M54.50 CHRONIC RIGHT-SIDED LOW BACK PAIN WITHOUT SCIATICA: Primary | ICD-10-CM

## 2021-07-13 PROCEDURE — 97112 NEUROMUSCULAR REEDUCATION: CPT

## 2021-07-13 PROCEDURE — 97140 MANUAL THERAPY 1/> REGIONS: CPT

## 2021-07-13 NOTE — PROGRESS NOTES
Daily Note     Today's date: 2021  Patient name: Marcelo Foote  : 1970  MRN: [id number]  Referring provider: Michael Cooper DO  Dx:   Encounter Diagnosis     ICD-10-CM    1  Chronic right-sided low back pain without sciatica  M54 5     G89 29    2  Neck pain  M54 2    3  Muscle pain  M79 10    4  Pelvic pressure in female  R10 2    5  Dyspareunia in female  N94 10        Start Time: 1100  Stop Time: 1130  Total time in clinic (min): 30 minutes    Subjective: Patient noted that she feels she is stabilizing in the SI joints and her pain levels  Patient feels that she is (I) with her HEP  Patient noted she vacuumed her whole basement on  and she noted some discomfort in the back  Objective: See treatment diary below      Assessment: PT performed lumbar PA mobs to assist c pain levels and stiffness  Patient noted improvements post session  PT educated the patient on her HEP as well as how to decrease her urinary frequency at home  Due to her improvements, the patient will be D/C from PT at next session  Patient would benefit from continued PT to allow the patient to return to her PLOF  Plan: Continue per plan of care  Plan to D/C at next session  Precautions: interstitial cystitis, hashimoto's, prolapse      Manuals 3/25 4/15 5/13 6/17 7/13   1/20 2/4 2/17   Sub-occipital release             Re-eval             Hip flexor STM    glute med and piriformis  STM  MW      erector spine and QL  MW   Lumbar PA, pelvic rocking, SI PA  MW  Grade  III MW  Grade  III MW  Grade  III MW  Grade  III   MW  grade  III  MW  grade  III   Hip posterior and inferior mob  Self mob edu      self  postmob  Grade II     Re-eval   MW          Neuro Re-Ed     Chin tuck             Scapular retraction             Serratus punch             TA in quad                                       HEP educatoion HEP edu, bathroom mechanics, breathing and kegels    HEP edu, foam rolling and diet HEP edu HEP edu HEP, decrease urinary frequency edu   MW   MW HEP, anatomy, positioning, progression  MW HEP  MW   Hip hikes        2x10 ea     3 way hip        2x10 ea     clams             TA + marching   x10 ea          TA + fallouts   x10 ea          External anal sphinter   x15  5" hold                                                              Ther Ex    UT stretch             Levator scap stretch             Hip add             Hip abd             glute squeeze             Half butterfly             Piriformis stretch (pulling towards)             Hip adductor stretch standing          3x15" ea   Frog stretch          3x15"   Cat/cow in standing  Quad cat/camel  x10        x5 ea   Child pose (forward, diagonal)  x5 ea                        Elipitical        5'     Ther Activity                              Gait Training                              Modalities

## 2021-08-18 ENCOUNTER — OFFICE VISIT (OUTPATIENT)
Dept: PHYSICAL THERAPY | Facility: CLINIC | Age: 51
End: 2021-08-18
Payer: COMMERCIAL

## 2021-08-18 DIAGNOSIS — M54.50 CHRONIC RIGHT-SIDED LOW BACK PAIN WITHOUT SCIATICA: Primary | ICD-10-CM

## 2021-08-18 DIAGNOSIS — N94.10 DYSPAREUNIA IN FEMALE: ICD-10-CM

## 2021-08-18 DIAGNOSIS — M79.10 MUSCLE PAIN: ICD-10-CM

## 2021-08-18 DIAGNOSIS — G89.29 CHRONIC RIGHT-SIDED LOW BACK PAIN WITHOUT SCIATICA: Primary | ICD-10-CM

## 2021-08-18 DIAGNOSIS — R10.2 PELVIC PRESSURE IN FEMALE: ICD-10-CM

## 2021-08-18 DIAGNOSIS — M54.2 NECK PAIN: ICD-10-CM

## 2021-08-18 PROCEDURE — 97112 NEUROMUSCULAR REEDUCATION: CPT

## 2021-08-18 PROCEDURE — 97140 MANUAL THERAPY 1/> REGIONS: CPT

## 2021-08-18 PROCEDURE — 97110 THERAPEUTIC EXERCISES: CPT

## 2021-08-18 NOTE — LETTER
2021    Leola Longo DO  [address] [facility]  [address]    Patient: Greg Martinez   YOB: 1970   Date of Visit: 2021     Encounter Diagnosis     ICD-10-CM    1  Chronic right-sided low back pain without sciatica  M54 5     G89 29    2  Neck pain  M54 2    3  Muscle pain  M79 10    4  Pelvic pressure in female  R10 2    5  Dyspareunia in female  N94 10        Dear Dr Marshal Gandara: Thank you for your recent referral of Greg Martinez  Please review the attached evaluation summary from Zoraida's recent visit  Please verify that you agree with the plan of care by signing the attached order  If you have any questions or concerns, please do not hesitate to call  I sincerely appreciate the opportunity to share in the care of one of your patients and hope to have another opportunity to work with you in the near future  Sincerely,    Rahel Leonardo, PT      Referring Provider:      I certify that I have read the below Plan of Care and certify the need for these services furnished under this plan of treatment while under my care  Leola Longo DO  [address] [employer] [employer]  [place] [address]  Via Fax: [phone]          PT Discharge    Today's date: 2021  Patient name: Greg Martinez  : 1970  MRN: [id number]  Referring provider: Rena Fernandez DO  Dx:   Encounter Diagnosis     ICD-10-CM    1  Chronic right-sided low back pain without sciatica  M54 5     G89 29    2  Neck pain  M54 2    3  Muscle pain  M79 10    4  Pelvic pressure in female  R10 2    5  Dyspareunia in female  N94 10        Start Time: 1100  Stop Time: 1200  Total time in clinic (min): 60 minutes    Assessment  Assessment details: [address] [facility] is a 48 yo female presenting to her one month follow up to address her pelvic floor dysfunction, lumbar spine and sacral pain that has been ongoing for over 9 months   Since last re-evaluation the patient has had an IC flare which in return has increased her pelvic pain and lumbar spine pain  Patient noted prior to that she was able to manage her symptoms with exercises  Patient continues to notes increased SI joint pain as well as lumbar spine pain with mowing the lawn, sleeping and prolonged activities  Patient continues to note improvements post manuals and PT educated the patient on which exercises to perform when on a break from PT  At this time the patient will be D/C from PT due to primary PT going on maternity leave  Patient agrees with POC and will follow a HEP until she returns  Impairments: abnormal muscle firing, abnormal or restricted ROM, activity intolerance, impaired physical strength, lacks appropriate home exercise program, pain with function, poor posture  and poor body mechanics    Symptom irritability: moderateUnderstanding of Dx/Px/POC: good   Prognosis: good    Goals  STG: In four weeks the patient will:    1  Be (I) with her HEP  (75% MET)  2  Increase hip and core strength to 4+/5 MMT score to assist c functional activities (MET)  3  Complete daily voiding and bowel log  (MET)  4  Perform x10 diaphragmatic breathes without cues  (MET)  5  Perform x 10 Kegels with a 4-5 second hold with proper breathing and relaxation of the pelvic floor muscles  (MET)      LTG: In six weeks, the patient will:    1  Perform x10 TA contractions with proper activation and no cues provided by PT  (MET)  2  Void without straining in the proper body mechanics  (MET)  3  tolerating speculum for well woman exam with minimal to no pain post  (MET)  4  Increase hip and core strength to 5/5 MMT score to assist c prolonged activities  (in progress)    New goals: in four weeks the patient will:  1  Chop an onion with propers TA and PF contraction and require no cues for breathing by PT  (70% MET)  2  Ascend and descend the stairs with TA activation  (70% MET)  3   Perform a floor transfer with proper form and TA activation  (70% MET)  4  Sleep 6-8 hours with minimal to no pain in the lumbar spine  (70% MET)  5  [place] the lawn without pain in the back  (in progress)        Plan  Therapy options: Patient will be D/C from PT and return if symptoms worsen  Frequency: 1x month  Plan of Care beginning date: 2021  Plan of Care expiration date: 2021  Treatment plan discussed with: patient        Subjective Evaluation    History of Present Illness  Mechanism of injury: Patient noted since last re-evaluation the patient has improved with strength in her LEs and core, however she had a IC flare a few weeks ago and that sparked pelvic and lumbar spine pain  Patient noted she has tried some of the exercises and at times she has had some improvements with pain  Patient noted at this point she would like to take a break due to her primary PT going out on maternity leave  Patient noted she would like to follow up when the primary PT is back             Recurrent probem    Quality of life: good    Pain  Current pain ratin  At best pain ratin  At worst pain ratin  Location: pelvic and lumbar spine, neck pain  Quality: dull ache and pulling  Relieving factors: rest  Aggravating factors: sitting, walking, stair climbing and lifting  Progression: improved    Social Support    Employment status: not working  Hand dominance: right  Life stress: 5 children to take care of     Patient Goals  Patient goals for therapy: increased strength, independence with ADLs/IADLs, return to sport/leisure activities, increased motion and decreased pain  Patient goal: "to have less pain with well women exam " (MET) "to sleep without pain " (in progress) "to mow the lawn without pain " (in progress)        Objective     Active Range of Motion   Cervical/Thoracic Spine       Cervical    Flexion:  with pain Restriction level: moderate  Extension:  Restriction level: minimal  Left lateral flexion:  Restriction level: moderate  Right lateral flexion:  Restriction level moderate  Left rotation:  Restriction level: minimal  Right rotation:  Restriction level: minimal    Lumbar   Flexion:  Restriction level: minimal  Extension:  Restriction level: moderate  Left lateral flexion:  Restriction level: minimal  Right lateral flexion:  Restriction level: minimal  Left rotation:  Restriction level: minimal  Right rotation:  Restriction level: minimal    Strength/Myotome Testing   Cervical Spine     Left   Neck lateral flexion (C3): 3+    Right   Neck lateral flexion (C3): 3+    Left Shoulder     Planes of Motion   Flexion: 4   Extension: 4   Abduction: 4   External rotation at 0°: 4-   Internal rotation at 0°: 4-     Right Shoulder     Planes of Motion   Flexion: 4   Extension: 4   Abduction: 4   External rotation at 0°: 4-   Internal rotation at 0°: 4-     Left Elbow   Flexion: 4+  Extension: 4+    Right Elbow   Flexion: 4+  Extension: 4+    Left Wrist/Hand   Wrist extension: 4+  Wrist flexion: 4+    Right Wrist/Hand   Wrist extension: 4+  Wrist flexion: 4+    Left Hip   Planes of Motion   Flexion: 4+  Abduction: 4+  Adduction: 4+  External rotation: 4+  Internal rotation: 4+    Right Hip   Planes of Motion   Flexion: 4+  Abduction: 4+  Adduction: 4+  External rotation: 4+  Internal rotation: 4+  Pelvic Floor Exam     General Perineum Exam:   perineum intact     Negative for no pelvic organ prolapse at rest and perianal erythema    Visual Inspection of Perineum:   Excursion of perineal body in cephalad direction with contraction of pelvic floor muscles (PFM): fair  and good  Excursion of perineal body in caudal direction with relaxation of pelvic floor muscles (PFM): fair  and good   Involuntary relaxation with bearing down: no  Cotton swab test: non-tender  Cough reflex: no cough reflex  Sphincter Tone Resting: normal  Sphincter Tone Squeeze: weak  Sensation: intact  Tenderness: unprovoked    Pelvic Organ Prolapse no pelvic organ prolapse at rest    Pelvic Floor Muscle Exam     Palpation   Minimal increased muscle tension in the pubococcygeus and iliococcygeus  Minimal tenderness on right in the pubococcygeus and iliococcygeus  No tenderness on left in the pubococcygeus and iliococcygeus    Muscle Contraction: substitution and mild  Breathing pattern with contraction: within normal limits and diaphragmatic  Pelvic floor muscle relaxation is complete  2 seconds required for complete relaxation  PERFECT Score   Power right: 3/5 (Did not assess during DC)  Power left: 3/5 (Did not assess during DC)  Endurance (seconds to max): 4  Repetitions (before fatigue): 3  Fast flicks (in 10 seconds): 8    SMEG Biofeedback   to be assessed next treatment  Sensor used: external sensors placed perianally  Positioning: sitting  Holding ability: poor and fair  Derecruitment: fairelevated and low  Biofeedback comments: Seated: rest: 0- 5; contraction: 7 mV  Supine: rest: 5 mV; contraction: 40mv          Precautions: interstitial cystitis, hashimoto's, prolapse      Manuals 3/25 4/15 5/13 6/17 7/13 8/18  1/20 2/4 2/17   Sub-occipital release             Re-eval      MW       Hip flexor STM    glute med and piriformis  STM  MW      erector spine and QL  MW   Lumbar PA, pelvic rocking, SI PA  MW  Grade  III MW  Grade  III MW  Grade  III MW  Grade  III MW  Grade  III  MW  grade  III  MW  grade  III   Hip posterior and inferior mob  Self mob edu    Self mob with towel    self  postmob  Grade II     Self lumbar traction and PT lumbar traction with strap      MW  Grade  III       Neuro Re-Ed     Chin tuck             Scapular retraction             Serratus punch             TA in quad                                       HEP educatoion HEP edu, bathroom mechanics, breathing and kegels    HEP edu, foam rolling and diet HEP edu HEP edu HEP, decrease urinary frequency edu   MW HEP  edu  MW HEP, anatomy, positioning, progression  MW HEP  MW   Hip hikes        2x10 ea     3 way hip        2x10 ea     clams             TA + marching   x10 ea          TA + fallouts   x10 ea          External anal sphinter   x15  5" hold                                                              Ther Ex    UT stretch             Levator scap stretch             Hip add             Hip abd             glute squeeze             Half butterfly             Piriformis stretch (pulling towards)             Hip adductor stretch standing          3x15" ea   Frog stretch          3x15"   Cat/cow in standing  Quad cat/camel  x10    Quad  x10    x5 ea   Child pose (forward, diagonal)  x5 ea    5x15" ea                    Elipitical        5'     Ther Activity                              Gait Training                              Modalities

## 2021-08-18 NOTE — PROGRESS NOTES
PT Discharge    Today's date: 2021  Patient name: Madelin Rosales  : 1970  MRN: [id number]  Referring provider: Llia Mckee DO  Dx:   Encounter Diagnosis     ICD-10-CM    1  Chronic right-sided low back pain without sciatica  M54 5     G89 29    2  Neck pain  M54 2    3  Muscle pain  M79 10    4  Pelvic pressure in female  R10 2    5  Dyspareunia in female  N94 10        Start Time: 1100  Stop Time: 1200  Total time in clinic (min): 60 minutes    Assessment  Assessment details: Marco Sahu is a 48 yo female presenting to her one month follow up to address her pelvic floor dysfunction, lumbar spine and sacral pain that has been ongoing for over 9 months  Since last re-evaluation the patient has had an IC flare which in return has increased her pelvic pain and lumbar spine pain  Patient noted prior to that she was able to manage her symptoms with exercises  Patient continues to notes increased SI joint pain as well as lumbar spine pain with mowing the lawn, sleeping and prolonged activities  Patient continues to note improvements post manuals and PT educated the patient on which exercises to perform when on a break from PT  At this time the patient will be D/C from PT due to primary PT going on maternity leave  Patient agrees with POC and will follow a HEP until she returns  Impairments: abnormal muscle firing, abnormal or restricted ROM, activity intolerance, impaired physical strength, lacks appropriate home exercise program, pain with function, poor posture  and poor body mechanics    Symptom irritability: moderateUnderstanding of Dx/Px/POC: good   Prognosis: good    Goals  STG: In four weeks the patient will:    1  Be (I) with her HEP  (75% MET)  2  Increase hip and core strength to 4+/5 MMT score to assist c functional activities (MET)  3  Complete daily voiding and bowel log  (MET)  4  Perform x10 diaphragmatic breathes without cues  (MET)  5   Perform x 10 Kegels with a 4-5 second hold with proper breathing and relaxation of the pelvic floor muscles  (MET)      LTG: In six weeks, the patient will:    1  Perform x10 TA contractions with proper activation and no cues provided by PT  (MET)  2  Void without straining in the proper body mechanics  (MET)  3  tolerating speculum for well woman exam with minimal to no pain post  (MET)  4  Increase hip and core strength to 5/5 MMT score to assist c prolonged activities  (in progress)    New goals: in four weeks the patient will:  1  Chop an onion with propers TA and PF contraction and require no cues for breathing by PT  (70% MET)  2  Ascend and descend the stairs with TA activation  (70% MET)  3  Perform a floor transfer with proper form and TA activation  (70% MET)  4  Sleep 6-8 hours with minimal to no pain in the lumbar spine  (70% MET)  5  [place] the lawn without pain in the back  (in progress)        Plan  Therapy options: Patient will be D/C from PT and return if symptoms worsen  Frequency: 1x month  Plan of Care beginning date: 2021  Plan of Care expiration date: 2021  Treatment plan discussed with: patient        Subjective Evaluation    History of Present Illness  Mechanism of injury: Patient noted since last re-evaluation the patient has improved with strength in her LEs and core, however she had a IC flare a few weeks ago and that sparked pelvic and lumbar spine pain  Patient noted she has tried some of the exercises and at times she has had some improvements with pain  Patient noted at this point she would like to take a break due to her primary PT going out on maternity leave  Patient noted she would like to follow up when the primary PT is back             Recurrent probem    Quality of life: good    Pain  Current pain ratin  At best pain ratin  At worst pain ratin  Location: pelvic and lumbar spine, neck pain  Quality: dull ache and pulling  Relieving factors: rest  Aggravating factors: sitting, walking, stair climbing and lifting  Progression: improved    Social Support    Employment status: not working  Hand dominance: right  Life stress: 5 children to take care of     Patient Goals  Patient goals for therapy: increased strength, independence with ADLs/IADLs, return to sport/leisure activities, increased motion and decreased pain  Patient goal: "to have less pain with well women exam " (MET) "to sleep without pain " (in progress) "to mow the lawn without pain " (in progress)        Objective     Active Range of Motion   Cervical/Thoracic Spine       Cervical    Flexion:  with pain Restriction level: moderate  Extension:  Restriction level: minimal  Left lateral flexion:  Restriction level: moderate  Right lateral flexion:  Restriction level moderate  Left rotation:  Restriction level: minimal  Right rotation:  Restriction level: minimal    Lumbar   Flexion:  Restriction level: minimal  Extension:  Restriction level: moderate  Left lateral flexion:  Restriction level: minimal  Right lateral flexion:  Restriction level: minimal  Left rotation:  Restriction level: minimal  Right rotation:  Restriction level: minimal    Strength/Myotome Testing   Cervical Spine     Left   Neck lateral flexion (C3): 3+    Right   Neck lateral flexion (C3): 3+    Left Shoulder     Planes of Motion   Flexion: 4   Extension: 4   Abduction: 4   External rotation at 0°: 4-   Internal rotation at 0°: 4-     Right Shoulder     Planes of Motion   Flexion: 4   Extension: 4   Abduction: 4   External rotation at 0°: 4-   Internal rotation at 0°: 4-     Left Elbow   Flexion: 4+  Extension: 4+    Right Elbow   Flexion: 4+  Extension: 4+    Left Wrist/Hand   Wrist extension: 4+  Wrist flexion: 4+    Right Wrist/Hand   Wrist extension: 4+  Wrist flexion: 4+    Left Hip   Planes of Motion   Flexion: 4+  Abduction: 4+  Adduction: 4+  External rotation: 4+  Internal rotation: 4+    Right Hip   Planes of Motion   Flexion: 4+  Abduction: 4+  Adduction: 4+  External rotation: 4+  Internal rotation: 4+  Pelvic Floor Exam     General Perineum Exam:   perineum intact  Negative for no pelvic organ prolapse at rest and perianal erythema    Visual Inspection of Perineum:   Excursion of perineal body in cephalad direction with contraction of pelvic floor muscles (PFM): fair  and good  Excursion of perineal body in caudal direction with relaxation of pelvic floor muscles (PFM): fair  and good   Involuntary relaxation with bearing down: no  Cotton swab test: non-tender  Cough reflex: no cough reflex  Sphincter Tone Resting: normal  Sphincter Tone Squeeze: weak  Sensation: intact  Tenderness: unprovoked    Pelvic Organ Prolapse no pelvic organ prolapse at rest    Pelvic Floor Muscle Exam     Palpation   Minimal increased muscle tension in the pubococcygeus and iliococcygeus  Minimal tenderness on right in the pubococcygeus and iliococcygeus  No tenderness on left in the pubococcygeus and iliococcygeus    Muscle Contraction: substitution and mild  Breathing pattern with contraction: within normal limits and diaphragmatic  Pelvic floor muscle relaxation is complete  2 seconds required for complete relaxation       PERFECT Score   Power right: 3/5 (Did not assess during DC)  Power left: 3/5 (Did not assess during DC)  Endurance (seconds to max): 4  Repetitions (before fatigue): 3  Fast flicks (in 10 seconds): 8    SMEG Biofeedback   to be assessed next treatment  Sensor used: external sensors placed perianally  Positioning: sitting  Holding ability: poor and fair  Derecruitment: fairelevated and low  Biofeedback comments: Seated: rest: 0- 5; contraction: 7 mV  Supine: rest: 5 mV; contraction: 40mv          Precautions: interstitial cystitis, hashimoto's, prolapse      Manuals 3/25 4/15 5/13 6/17 7/13 8/18  1/20 2/4 2/17   Sub-occipital release             Re-eval      MW       Hip flexor STM    glute med and piriformis  STM  MW      erector spine and QL  MW Lumbar PA, pelvic rocking, SI PA  MW  Grade  III MW  Grade  III MW  Grade  III MW  Grade  III MW  Grade  III  MW  grade  III  MW  grade  III   Hip posterior and inferior mob  Self mob edu    Self mob with towel    self  postmob  Grade II     Self lumbar traction and PT lumbar traction with strap      MW  Grade  III       Neuro Re-Ed     Chin tuck             Scapular retraction             Serratus punch             TA in quad                                       HEP educatoion HEP edu, bathroom mechanics, breathing and kegels    HEP edu, foam rolling and diet HEP edu HEP edu HEP, decrease urinary frequency edu   MW HEP  edu  MW HEP, anatomy, positioning, progression  MW HEP  MW   Hip hikes        2x10 ea     3 way hip        2x10 ea     clams             TA + marching   x10 ea          TA + fallouts   x10 ea          External anal sphinter   x15  5" hold                                                              Ther Ex    UT stretch             Levator scap stretch             Hip add             Hip abd             glute squeeze             Half butterfly             Piriformis stretch (pulling towards)             Hip adductor stretch standing          3x15" ea   Frog stretch          3x15"   Cat/cow in standing  Quad cat/camel  x10    Quad  x10    x5 ea   Child pose (forward, diagonal)  x5 ea    5x15" ea                    Elipitical        5'     Ther Activity                              Gait Training                              Modalities

## 2021-12-07 ENCOUNTER — OFFICE VISIT (OUTPATIENT)
Dept: GYNECOLOGY | Facility: CLINIC | Age: 51
End: 2021-12-07
Payer: COMMERCIAL

## 2021-12-07 VITALS
DIASTOLIC BLOOD PRESSURE: 70 MMHG | HEIGHT: 65 IN | SYSTOLIC BLOOD PRESSURE: 130 MMHG | BODY MASS INDEX: 26.26 KG/M2 | WEIGHT: 157.6 LBS | HEART RATE: 102 BPM

## 2021-12-07 DIAGNOSIS — Z79.890 HORMONE REPLACEMENT THERAPY (HRT): ICD-10-CM

## 2021-12-07 DIAGNOSIS — Z01.419 ENCOUNTER FOR GYNECOLOGICAL EXAMINATION WITHOUT ABNORMAL FINDING: Primary | ICD-10-CM

## 2021-12-07 DIAGNOSIS — Z13.820 SCREENING FOR OSTEOPOROSIS: ICD-10-CM

## 2021-12-07 DIAGNOSIS — Z12.4 ENCOUNTER FOR PAPANICOLAOU SMEAR FOR CERVICAL CANCER SCREENING: ICD-10-CM

## 2021-12-07 PROCEDURE — 76977 US BONE DENSITY MEASURE: CPT | Performed by: OBSTETRICS & GYNECOLOGY

## 2021-12-07 PROCEDURE — G0145 SCR C/V CYTO,THINLAYER,RESCR: HCPCS | Performed by: OBSTETRICS & GYNECOLOGY

## 2021-12-07 PROCEDURE — S0612 ANNUAL GYNECOLOGICAL EXAMINA: HCPCS | Performed by: OBSTETRICS & GYNECOLOGY

## 2021-12-13 LAB
LAB AP GYN PRIMARY INTERPRETATION: NORMAL
Lab: NORMAL

## 2022-02-10 ENCOUNTER — EVALUATION (OUTPATIENT)
Dept: PHYSICAL THERAPY | Facility: CLINIC | Age: 52
End: 2022-02-10
Payer: COMMERCIAL

## 2022-02-10 DIAGNOSIS — M24.571 EQUINUS CONTRACTURE OF RIGHT ANKLE: ICD-10-CM

## 2022-02-10 DIAGNOSIS — M72.2 PLANTAR FASCIITIS: ICD-10-CM

## 2022-02-10 DIAGNOSIS — M24.572 EQUINUS CONTRACTURE OF LEFT ANKLE: ICD-10-CM

## 2022-02-10 DIAGNOSIS — M79.672 FOOT PAIN, BILATERAL: Primary | ICD-10-CM

## 2022-02-10 DIAGNOSIS — M79.671 FOOT PAIN, BILATERAL: Primary | ICD-10-CM

## 2022-02-10 DIAGNOSIS — M72.2 PLANTAR FASCIITIS OF LEFT FOOT: ICD-10-CM

## 2022-02-10 PROCEDURE — 97161 PT EVAL LOW COMPLEX 20 MIN: CPT

## 2022-02-10 PROCEDURE — 97110 THERAPEUTIC EXERCISES: CPT

## 2022-02-10 NOTE — LETTER
2022    Desirae Sorenson, [address] [facility], [place] [address]    Patient: Adrian Elizondo   YOB: 1970   Date of Visit: 2/10/2022     Encounter Diagnosis     ICD-10-CM    1  Foot pain, bilateral  M79 671     M79 672    2  Plantar fasciitis of left foot  M72 2    3  Equinus contracture of left ankle  M24 572    4  Equinus contracture of right ankle  M24 571    5  Plantar fasciitis  M72 2        Dear Dr Dorantes Innocent: Thank you for your recent referral of Adrian Elizondo  Please review the attached evaluation summary from Zoraida's recent visit  Please verify that you agree with the plan of care by signing the attached order  If you have any questions or concerns, please do not hesitate to call  I sincerely appreciate the opportunity to share in the care of one of your patients and hope to have another opportunity to work with you in the near future  Sincerely,    Jessica Lombard, PT      Referring Provider:      I certify that I have read the below Plan of Care and certify the need for these services furnished under this plan of treatment while under my care  Desirae Sorenson DPM  [facility] [address] [address]  Via Fax: [phone]          PT Evaluation     Today's date: 2/10/2022  Patient name: Adrian Elizondo  : 1970  MRN: [id number]  Referring provider: Stacey Mcdaniel DPM  Dx:   Encounter Diagnosis     ICD-10-CM    1  Foot pain, bilateral  M79 671     M79 672    2  Plantar fasciitis of left foot  M72 2    3  Equinus contracture of left ankle  M24 572    4  Equinus contracture of right ankle  M24 571    5  Plantar fasciitis  M72 2        Start Time: 1500  Stop Time: 1600  Total time in clinic (min): 60 minutes    Assessment  Assessment details: Pt is a 46 y/o female who presents to physical therapy w/ signs and symptoms consistent with gait deviations and pain related to confirmed heel spurs (L > R) in B feet   Pt's impairments include poor posture, poor gait mechanics, increased pain, and decreased ROM and strength  These impairments limit the pt's functional ability to stand and walk for long periods of time which the pt does frequently in her day-to-day life  Pt would benefit from PT to address these impairments, decrease pain with her functional mobility and allow her to return to her PLOF  Impairments: abnormal or restricted ROM, activity intolerance, impaired physical strength and pain with function  Functional limitations: Increased pain and difficulty with standing and wlaking for long amounts of time  Symptom irritability: lowUnderstanding of Dx/Px/POC: excellent  Goals  STG: In 2 weeks pt will:    1  Be independent w/ HEP  2  Decrease pain at worst from 6/10 pain to 0/10 consistently  3  Increase L ankle DF AROM from -2 degrees to 0 degrees    LTG: In 6 weeks (or per POC) pt will:    1  Increase B ankle eversion strength from 4/5 to 5/5  2  Walk for 50% of the day in her home with decreased pain in B feet  3  Increase FOTO score to 72 to demonstrate improved function in her home and community    Plan  Patient would benefit from: PT eval  Planned therapy interventions: strengthening, stretching, patient education, neuromuscular re-education, balance, home exercise program, joint mobilization, manual therapy, coordination, therapeutic exercise and therapeutic activities  Frequency: 1x week  Duration in visits: 6  Duration in weeks: 6  Plan of Care beginning date: 2/10/2022  Plan of Care expiration date: 3/24/2022        Subjective Evaluation    History of Present Illness  Date of onset: 1/6/2022  Mechanism of injury: Pt began experiencing B worsening foot pain (L > R) approximately 4-5 weeks ago  Pt had x-rays taken which showed heel spurs L > R foot  Pt also has PMHx significant for low back pain which she has received prior PT for and also reports that her podiatrist believes stems from impairments in her feet   Pt walks and stands frequently and consistently which prior to her cortisone injection worsened her pain exponentially  Pt received a cortisone injection in her L heel (2022) and that her back pain has since been 0/10  Pt also reports consistently wearing supportive shoes around her home and just purchased curex brand insoles which she reports are  Extremely helpful  Pt also reports no numbness in calves or feet, but that her calves are tight  Recurrent probem    Quality of life: good    Pain  Current pain ratin  At best pain ratin  At worst pain ratin (Prior to cortisone injection in L heel)  Location: B feet L > R  Relieving factors: relaxation and rest  Aggravating factors: standing and walking    Social Support  Lives in: multiple-level home  Lives with: spouse and adult children    Hand dominance: right      Diagnostic Tests  X-ray: abnormal  Treatments  Previous treatment: chiropractic and physical therapy  Current treatment: chiropractic and physical therapy  Patient Goals  Patient goals for therapy: decreased pain, increased motion, increased strength and return to sport/leisure activities  Patient goal: "to walk in her home without pain or difficulty "        Objective     Static Posture     Ankle/Foot   Ankle/Foot (Left): Calcaneovalgus and pes planus  Ankle/Foot (Right): Calcaneovalgus       Observations     Additional Observation Details  Pes planus L > R  Calcaneovalgus (in WB) R > L  Fifth digit on each foot raised off of ground in neutral standing posture with shoes and socks doffed, indicating pronation of B feet in static stance    Active Range of Motion   Left Ankle/Foot   Dorsiflexion (ke): -2 degrees   Plantar flexion: 33 degrees   Inversion: 23 degrees   Eversion: 33 degrees   Great toe flexion: 55 degrees     Right Ankle/Foot   Dorsiflexion (ke): 0 degrees   Plantar flexion: 40 degrees   Inversion: 29 degrees   Eversion: 10 degrees   Great toe flexion: 50 degrees Passive Range of Motion   Left Ankle/Foot    Dorsiflexion (ke): 0 degrees     Right Ankle/Foot    Dorsiflexion (ke): 5 degrees     Strength/Myotome Testing     Left Ankle/Foot   Dorsiflexion: 5  Plantar flexion: 5  Inversion: 5  Eversion: 4  Great toe flexion: 4-    Right Ankle/Foot   Dorsiflexion: 5  Plantar flexion: 5  Inversion: 5  Eversion: 4  Great toe flexion: 5    Tests   Left Ankle/Foot   Negative for windlass  Right Ankle/Foot   Negative for windlass  Additional Tests Details  Calcaneal bump test positive B feet, indicating no pain despite confirmed heel spurs (L > R) in B feet      Flowsheet Rows      Most Recent Value   PT/OT G-Codes    Current Score 57   Projected Score 72   Assessment Type Evaluation             Precautions: Recent hx of skin cancer (removed 11/2021 and 12/2021)  [facility] ACCESS CODE: [id number]      Manuals 2/10                                                                Neuro Re-Ed             SLS nv            Arch lifts nv            [place]  nv                                                                Ther Ex             Gastroc stretch 10x10" hold each            Soleus stretch 10x10" hold each            Heel raises 15x            Ankle 4-way nv            bike nv            Great toe stretch nv            Towel scrunches nv                         Ther Activity                                       Gait Training                                       Modalities                                            Attestation with edits by Vasquez Wilson PT at 2/10/2022  7:15 PM:  I supervised the visit  We discussed the case to ensure appropriate continuation and progression of care and I reviewed the documentation  AMINA Montano provided treatment under direct supervision of Vasquez Wilson DPT

## 2022-02-10 NOTE — PROGRESS NOTES
PT Evaluation     Today's date: 2/10/2022  Patient name: Serenity Cota  : 1970  MRN: [id number]  Referring provider: Kae Howe DPM  Dx:   Encounter Diagnosis     ICD-10-CM    1  Foot pain, bilateral  M79 671     M79 672    2  Plantar fasciitis of left foot  M72 2    3  Equinus contracture of left ankle  M24 572    4  Equinus contracture of right ankle  M24 571    5  Plantar fasciitis  M72 2        Start Time: 1500  Stop Time: 1600  Total time in clinic (min): 60 minutes    Assessment  Assessment details: Pt is a 46 y/o female who presents to physical therapy w/ signs and symptoms consistent with gait deviations and pain related to confirmed heel spurs (L > R) in B feet  Pt's impairments include poor posture, poor gait mechanics, increased pain, and decreased ROM and strength  These impairments limit the pt's functional ability to stand and walk for long periods of time which the pt does frequently in her day-to-day life  Pt would benefit from PT to address these impairments, decrease pain with her functional mobility and allow her to return to her PLOF  Impairments: abnormal or restricted ROM, activity intolerance, impaired physical strength and pain with function  Functional limitations: Increased pain and difficulty with standing and wlaking for long amounts of time  Symptom irritability: lowUnderstanding of Dx/Px/POC: excellent  Goals  STG: In 2 weeks pt will:    1  Be independent w/ HEP  2  Decrease pain at worst from 6/10 pain to 0/10 consistently  3  Increase L ankle DF AROM from -2 degrees to 0 degrees    LTG: In 6 weeks (or per POC) pt will:    1  Increase B ankle eversion strength from 4/5 to 5/5  2  Walk for 50% of the day in her home with decreased pain in B feet  3   Increase FOTO score to 72 to demonstrate improved function in her home and community    Plan  Patient would benefit from: PT eval  Planned therapy interventions: strengthening, stretching, patient education, neuromuscular re-education, balance, home exercise program, joint mobilization, manual therapy, coordination, therapeutic exercise and therapeutic activities  Frequency: 1x week  Duration in visits: 6  Duration in weeks: 6  Plan of Care beginning date: 2/10/2022  Plan of Care expiration date: 3/24/2022        Subjective Evaluation    History of Present Illness  Date of onset: 2022  Mechanism of injury: Pt began experiencing B worsening foot pain (L > R) approximately 4-5 weeks ago  Pt had x-rays taken which showed heel spurs L > R foot  Pt also has PMHx significant for low back pain which she has received prior PT for and also reports that her podiatrist believes stems from impairments in her feet  Pt walks and stands frequently and consistently which prior to her cortisone injection worsened her pain exponentially  Pt received a cortisone injection in her L heel (2022) and that her back pain has since been 0/10  Pt also reports consistently wearing supportive shoes around her home and just purchased curex brand insoles which she reports are  Extremely helpful  Pt also reports no numbness in calves or feet, but that her calves are tight            Recurrent probem    Quality of life: good    Pain  Current pain ratin  At best pain ratin  At worst pain ratin (Prior to cortisone injection in L heel)  Location: B feet L > R  Relieving factors: relaxation and rest  Aggravating factors: standing and walking    Social Support  Lives in: multiple-level home  Lives with: spouse and adult children    Hand dominance: right      Diagnostic Tests  X-ray: abnormal  Treatments  Previous treatment: chiropractic and physical therapy  Current treatment: chiropractic and physical therapy  Patient Goals  Patient goals for therapy: decreased pain, increased motion, increased strength and return to sport/leisure activities  Patient goal: "to walk in her home without pain or difficulty "        Objective     Static Posture Ankle/Foot   Ankle/Foot (Left): Calcaneovalgus and pes planus  Ankle/Foot (Right): Calcaneovalgus  Observations     Additional Observation Details  Pes planus L > R  Calcaneovalgus (in WB) R > L  Fifth digit on each foot raised off of ground in neutral standing posture with shoes and socks doffed, indicating pronation of B feet in static stance    Active Range of Motion   Left Ankle/Foot   Dorsiflexion (ke): -2 degrees   Plantar flexion: 33 degrees   Inversion: 23 degrees   Eversion: 33 degrees   Great toe flexion: 55 degrees     Right Ankle/Foot   Dorsiflexion (ke): 0 degrees   Plantar flexion: 40 degrees   Inversion: 29 degrees   Eversion: 10 degrees   Great toe flexion: 50 degrees     Passive Range of Motion   Left Ankle/Foot    Dorsiflexion (ke): 0 degrees     Right Ankle/Foot    Dorsiflexion (ke): 5 degrees     Strength/Myotome Testing     Left Ankle/Foot   Dorsiflexion: 5  Plantar flexion: 5  Inversion: 5  Eversion: 4  Great toe flexion: 4-    Right Ankle/Foot   Dorsiflexion: 5  Plantar flexion: 5  Inversion: 5  Eversion: 4  Great toe flexion: 5    Tests   Left Ankle/Foot   Negative for windlass  Right Ankle/Foot   Negative for windlass       Additional Tests Details  Calcaneal bump test positive B feet, indicating no pain despite confirmed heel spurs (L > R) in B feet      Flowsheet Rows      Most Recent Value   PT/OT G-Codes    Current Score 57   Projected Score 72   Assessment Type Evaluation             Precautions: Recent hx of skin cancer (removed 11/2021 and 12/2021)  [facility] Cervantes ACCESS CODE: [id number]      Manuals 2/10                                                                Neuro Re-Ed             SLS nv            Arch lifts nv            [place]  nv                                                                Ther Ex             Gastroc stretch 10x10" hold each            Soleus stretch 10x10" hold each            Heel raises 15x            Ankle 4-way nv            bike nv Great toe stretch nv            Towel scrunches nv                         Ther Activity                                       Gait Training                                       Modalities

## 2022-02-16 ENCOUNTER — OFFICE VISIT (OUTPATIENT)
Dept: PHYSICAL THERAPY | Facility: CLINIC | Age: 52
End: 2022-02-16
Payer: COMMERCIAL

## 2022-02-16 DIAGNOSIS — M72.2 PLANTAR FASCIITIS: Primary | ICD-10-CM

## 2022-02-16 DIAGNOSIS — M24.572 EQUINUS CONTRACTURE OF LEFT ANKLE: ICD-10-CM

## 2022-02-16 DIAGNOSIS — M72.2 PLANTAR FASCIITIS OF LEFT FOOT: ICD-10-CM

## 2022-02-16 DIAGNOSIS — M79.672 FOOT PAIN, BILATERAL: ICD-10-CM

## 2022-02-16 DIAGNOSIS — M79.671 FOOT PAIN, BILATERAL: ICD-10-CM

## 2022-02-16 DIAGNOSIS — M24.571 EQUINUS CONTRACTURE OF RIGHT ANKLE: ICD-10-CM

## 2022-02-16 PROCEDURE — 97110 THERAPEUTIC EXERCISES: CPT

## 2022-02-16 PROCEDURE — 97112 NEUROMUSCULAR REEDUCATION: CPT

## 2022-02-16 NOTE — PROGRESS NOTES
Daily Note     Today's date: 2022  Patient name: Jany Easley  : 1970  MRN: [id number]  Referring provider: Bill Vogel DPM  Dx:   Encounter Diagnosis     ICD-10-CM    1  Plantar fasciitis  M72 2    2  Foot pain, bilateral  M79 671     M79 672    3  Plantar fasciitis of left foot  M72 2    4  Equinus contracture of left ankle  M24 572    5  Equinus contracture of right ankle  M24 571        Start Time: 1100  Stop Time: 1130  Total time in clinic (min): 30 minutes    Subjective: Pt reported she has been compliant with her HEP and found a better way for her to perform soleus stretch in standing rather than sitting and she also wanted to follow up on the discussion had at her initial evaluation about the scar on her left arm as a result from her 2 surgeries to remove skin cancer  Objective: See treatment diary below      Assessment:  Reviewed pt's current HEP exercises to ensure appropriate frequency and duration of performance and that pt is completing them with proper form  Pt demonstrated the standing version of the soleus stretch on her HEP she tried, safely by holding onto her railing, on her steps at home and PT agreed that that was a better way to perform the exercise as she is already at the stairs for her other exercises  Pt was also concerned about her scar on her L arm, as she c/o pf pain over the area  PT consulted pt on scar massage techniques for home and asked that pt get a script from physician in order to treat the scar in order for PT to work on it in clinic  Pt demonstrated understanding of all education during the session and was agreeable to obtaining a script from her physician for treatment of the scar on her L arm  Patient would benefit from continued PT to allow pt to return to her PLOF  Plan: Continue per plan of care        Precautions: Recent hx of skin cancer (removed 2021 and 2021)  Linda Holliday ACCESS CODE: [id number]      Manuals 2/10 2/16 Neuro Re-Ed             SLS nv -           Arch lifts nv 10x each           [place]  nv -           Scar massage education  MW/SB           HEP update and education  MW/SB                                     Ther Ex             Gastroc stretch 10x10" hold each -           Soleus stretch 10x10" hold each 10x10"hold           Heel raises 15x 20x           Ankle 4-way nv -           bike nv -           Great toe stretch in doorway nv 10x10" hold           Towel scrunches nv -                        Ther Activity                                       Gait Training                                       Modalities

## 2022-02-23 ENCOUNTER — OFFICE VISIT (OUTPATIENT)
Dept: PHYSICAL THERAPY | Facility: CLINIC | Age: 52
End: 2022-02-23
Payer: COMMERCIAL

## 2022-02-23 DIAGNOSIS — M24.572 EQUINUS CONTRACTURE OF LEFT ANKLE: ICD-10-CM

## 2022-02-23 DIAGNOSIS — M72.2 PLANTAR FASCIITIS: Primary | ICD-10-CM

## 2022-02-23 DIAGNOSIS — M79.671 FOOT PAIN, BILATERAL: ICD-10-CM

## 2022-02-23 DIAGNOSIS — M72.2 PLANTAR FASCIITIS OF LEFT FOOT: ICD-10-CM

## 2022-02-23 DIAGNOSIS — M79.672 FOOT PAIN, BILATERAL: ICD-10-CM

## 2022-02-23 DIAGNOSIS — M24.571 EQUINUS CONTRACTURE OF RIGHT ANKLE: ICD-10-CM

## 2022-02-23 PROCEDURE — 97110 THERAPEUTIC EXERCISES: CPT

## 2022-02-23 PROCEDURE — 97112 NEUROMUSCULAR REEDUCATION: CPT

## 2022-02-23 NOTE — PROGRESS NOTES
Daily Note     Today's date: 2022  Patient name: Jhonathan Don  : 1970  MRN: [id number]  Referring provider: Violet Burnham DPM  Dx:   Encounter Diagnosis     ICD-10-CM    1  Plantar fasciitis  M72 2    2  Foot pain, bilateral  M79 671     M79 672    3  Plantar fasciitis of left foot  M72 2    4  Equinus contracture of left ankle  M24 572    5  Equinus contracture of right ankle  M24 571        Start Time: 1045  Stop Time: 1130  Total time in clinic (min): 45 minutes    Subjective: Patient noted at times she is now getting nerve pain in the foot every once in a while  Patient noted she is performing her HEP  Objective: See treatment diary below      Assessment: PT introduced 4 way band exercise to assist c strength and endurance  Patient required moderate cues for form and found eversion and inversion the most difficult  PT added to her HEP  Patient would benefit from continued PT to allow the patient to return to her PLOF  Plan: Continue per plan of care        Precautions: Recent hx of skin cancer (removed 2021 and 2021)  Joann Wick ACCESS CODE: [id number]      Manuals 2/10 2/16 2/23                                                              Neuro Re-Ed             SLS nv -           Arch lifts nv 10x each           [place]  nv -           Scar massage education  MW/SB           HEP update and education  MW/SB MW                                    Ther Ex             Gastroc stretch 10x10" hold each -           Soleus stretch 10x10" hold each 10x10"hold           Heel raises 15x 20x           Ankle 4-way nv - GTB  2x10 ea (B)          bike nv -           Great toe stretch in doorway nv 10x10" hold           Towel scrunches nv -                        Ther Activity                                       Gait Training                                       Modalities

## 2022-03-03 ENCOUNTER — OFFICE VISIT (OUTPATIENT)
Dept: PHYSICAL THERAPY | Facility: CLINIC | Age: 52
End: 2022-03-03
Payer: COMMERCIAL

## 2022-03-03 DIAGNOSIS — M24.572 EQUINUS CONTRACTURE OF LEFT ANKLE: ICD-10-CM

## 2022-03-03 DIAGNOSIS — M24.571 EQUINUS CONTRACTURE OF RIGHT ANKLE: ICD-10-CM

## 2022-03-03 DIAGNOSIS — M79.671 FOOT PAIN, BILATERAL: ICD-10-CM

## 2022-03-03 DIAGNOSIS — M72.2 PLANTAR FASCIITIS OF LEFT FOOT: ICD-10-CM

## 2022-03-03 DIAGNOSIS — M72.2 PLANTAR FASCIITIS: Primary | ICD-10-CM

## 2022-03-03 DIAGNOSIS — M79.672 FOOT PAIN, BILATERAL: ICD-10-CM

## 2022-03-03 PROCEDURE — 97112 NEUROMUSCULAR REEDUCATION: CPT

## 2022-03-03 PROCEDURE — 97140 MANUAL THERAPY 1/> REGIONS: CPT

## 2022-03-03 NOTE — PROGRESS NOTES
Daily Note     Today's date: 3/3/2022  Patient name: Khushbu Lauren  : 1970  MRN: [id number]  Referring provider: Ashley Gaspar DPM  Dx:   Encounter Diagnosis     ICD-10-CM    1  Plantar fasciitis  M72 2    2  Foot pain, bilateral  M79 671     M79 672    3  Plantar fasciitis of left foot  M72 2    4  Equinus contracture of left ankle  M24 572    5  Equinus contracture of right ankle  M24 571        Start Time: 930  Stop Time: 1000  Total time in clinic (min): 30 minutes    Subjective: Patient noted her SI joint pain is back now and she continues to get shocking pain in the bottom of the foot that lasts about 20 mins  Patient noted she has been performing her HEP  Objective: See treatment diary below      Assessment: PT performed lumbar and pelvic mobilizations to assist c pain after amb differently  Patient noted improvement post manuals  PT educated the patient to stretch every other day for the hip/back  PT introduced marble  to assist c foot intrinsic strength  Patient performed with no increased pain, however she noted increased stiffness in the R thigh  PT educated the patient on performing the marbles in a seated and standing position at home  Patient would benefit from continued PT to allow the patient to return to her PLOF  Plan: Continue per plan of care        Precautions: Recent hx of skin cancer (removed 2021 and 2021)  Katlin Ascencio ACCESS CODE: [id number]      Manuals 2/10 2/16 2/23 3/3         Lumbar PA and pelvic rocking mobs    Grade  III  MW         Sacral rocking mobs    MW  Grade  III                                   Neuro Re-Ed             SLS nv -           Arch lifts nv 10x each           [place]  nv -  1 jar each foot         Scar massage education  MW/SB           HEP update and education  MW/SB MW MW                                   Ther Ex             Gastroc stretch 10x10" hold each -           Soleus stretch 10x10" hold each 10x10"hold Heel raises 15x 20x           Ankle 4-way nv - GTB  2x10 ea (B)          bike nv -           Great toe stretch in doorway nv 10x10" hold           Towel scrunches nv -                        Ther Activity                                       Gait Training                                       Modalities

## 2022-03-10 ENCOUNTER — OFFICE VISIT (OUTPATIENT)
Dept: PHYSICAL THERAPY | Facility: CLINIC | Age: 52
End: 2022-03-10
Payer: COMMERCIAL

## 2022-03-10 DIAGNOSIS — M72.2 PLANTAR FASCIITIS OF LEFT FOOT: ICD-10-CM

## 2022-03-10 DIAGNOSIS — M79.672 FOOT PAIN, BILATERAL: ICD-10-CM

## 2022-03-10 DIAGNOSIS — M79.671 FOOT PAIN, BILATERAL: ICD-10-CM

## 2022-03-10 DIAGNOSIS — M24.571 EQUINUS CONTRACTURE OF RIGHT ANKLE: ICD-10-CM

## 2022-03-10 DIAGNOSIS — M24.572 EQUINUS CONTRACTURE OF LEFT ANKLE: ICD-10-CM

## 2022-03-10 DIAGNOSIS — M72.2 PLANTAR FASCIITIS: Primary | ICD-10-CM

## 2022-03-10 PROCEDURE — 97112 NEUROMUSCULAR REEDUCATION: CPT

## 2022-03-10 PROCEDURE — 97164 PT RE-EVAL EST PLAN CARE: CPT

## 2022-03-10 NOTE — LETTER
March 10, 2022    Kyle Wagner, [address] [facility] [address]    Patient: Scott Paredes   YOB: 1970   Date of Visit: 3/10/2022     Encounter Diagnosis     ICD-10-CM    1  Plantar fasciitis  M72 2    2  Foot pain, bilateral  M79 671     M79 672    3  Plantar fasciitis of left foot  M72 2    4  Equinus contracture of left ankle  M24 572    5  Equinus contracture of right ankle  M24 571        Dear Dr Toth Dies: Thank you for your recent referral of Scott Paredes  Please review the attached evaluation summary from Zoraida's recent visit  Please verify that you agree with the plan of care by signing the attached order  If you have any questions or concerns, please do not hesitate to call  I sincerely appreciate the opportunity to share in the care of one of your patients and hope to have another opportunity to work with you in the near future  Sincerely,    Darby Downey, PT      Referring Provider:      I certify that I have read the below Plan of Care and certify the need for these services furnished under this plan of treatment while under my care  Kyle Wagner DPM  [address] [place] [id number]  Via Fax: [phone]          PT Re-Evaluation  and PT Discharge    Today's date: 3/10/2022  Patient name: Scott Paredes  : 1970  MRN: [id number]  Referring provider: Annalisa Mccall DPM  Dx:   Encounter Diagnosis     ICD-10-CM    1  Plantar fasciitis  M72 2    2  Foot pain, bilateral  M79 671     M79 672    3  Plantar fasciitis of left foot  M72 2    4  Equinus contracture of left ankle  M24 572    5   Equinus contracture of right ankle  M24 571        Start Time: 1115  Stop Time: 1145  Total time in clinic (min): 30 minutes    Assessment  Assessment details: Pt is a 46 y/o female who presents to physical therapy w/ signs and symptoms consistent with gait deviations and pain related to confirmed heel spurs (L > R) in B feet  Since starting physical therapy the patient has improved with ankle ROM, pain levels, endurance and posture  Patient is able to amb and perform stairs with no pain  Patient is (I) with her HEP and ADLs  Due to noted improvements, the patient will be D/C from PT with a HEP  PT and patient agree with POC  Symptom irritability: lowUnderstanding of Dx/Px/POC: excellent  Goals  STG: In 2 weeks pt will:    1  Be independent w/ HEP (MET)  2  Decrease pain at worst from 6/10 pain to 0/10 consistently (MET)  3  Increase L ankle DF AROM from -2 degrees to 0 degrees(MET)    LTG: In 6 weeks (or per POC) pt will:    1  Increase B ankle eversion strength from 4/5 to 5/5 (in progress)  2  Walk for 50% of the day in her home with decreased pain in B feet (MET)  3  Increase FOTO score to 72 to demonstrate improved function in her home and community(MET)    Plan  Therapy options: D/C from PT  Frequency: 1x week  Duration in visits: 1  Plan of Care beginning date: 3/10/2022  Plan of Care expiration date: 3/10/2022        Subjective Evaluation    History of Present Illness  Date of onset: 2022  Mechanism of injury: Patient noted that the tingling occurs in the foot every once in a while and her LBP has returned  Patient noted she followed up with her physician and was cleared from PT  Patient feels that she is (I) with her HEP and ADLs             Recurrent probem    Quality of life: good    Pain  Current pain ratin  At best pain ratin  At worst pain ratin  Location: B feet L > R  Relieving factors: relaxation and rest  Aggravating factors: standing and walking  Progression: improved    Social Support  Lives in: multiple-level home  Lives with: spouse and adult children    Hand dominance: right      Diagnostic Tests  X-ray: abnormal  Treatments  Previous treatment: chiropractic and physical therapy  Current treatment: chiropractic and physical therapy  Patient Goals  Patient goal: "to walk in her home without pain or difficulty " (MET)        Objective     Static Posture     Ankle/Foot   Ankle/Foot (Left): Calcaneovalgus and pes planus  Ankle/Foot (Right): Calcaneovalgus  Observations     Additional Observation Details  Pes planus L > R  Calcaneovalgus (in WB) R > L  Fifth digit on each foot raised off of ground in neutral standing posture with shoes and socks doffed, indicating pronation of B feet in static stance    Active Range of Motion   Left Ankle/Foot   Dorsiflexion (ke): 0 degrees   Plantar flexion: 35 degrees   Inversion: 23 degrees   Eversion: 33 degrees   Great toe flexion: 55 degrees     Right Ankle/Foot   Dorsiflexion (ke): 0 degrees   Plantar flexion: 40 degrees   Inversion: 29 degrees   Eversion: 10 degrees   Great toe flexion: 50 degrees     Passive Range of Motion   Left Ankle/Foot    Dorsiflexion (ke): 0 degrees     Right Ankle/Foot    Dorsiflexion (ke): 5 degrees     Strength/Myotome Testing     Left Ankle/Foot   Dorsiflexion: 5  Plantar flexion: 5  Inversion: 5  Eversion: 4+  Great toe flexion: 4+    Right Ankle/Foot   Dorsiflexion: 5  Plantar flexion: 5  Inversion: 5  Eversion: 4+  Great toe flexion: 5    Tests   Left Ankle/Foot   Negative for windlass  Right Ankle/Foot   Negative for windlass       Additional Tests Details  Calcaneal bump test positive B feet, indicating no pain despite confirmed heel spurs (L > R) in B feet             Precautions: Recent hx of skin cancer (removed 11/2021 and 12/2021)  Linda Holliday ACCESS CODE: [id number]      Manuals 2/10 2/16 2/23 3/3 3/10        Lumbar PA and pelvic rocking mobs    Grade  III  MW         Sacral rocking mobs    MW  Grade  III         Re-eval     MW                     Neuro Re-Ed             SLS nv -           Arch lifts nv 10x each           [place]  nv -  1 jar each foot         Scar massage education  MW/SB           HEP update and education  MW/SB MW MW MW                                  Ther Ex             Gastroc stretch 10x10" hold each -           Soleus stretch 10x10" hold each 10x10"hold           Heel raises 15x 20x           Ankle 4-way nv - GTB  2x10 ea (B)          bike nv -           Great toe stretch in doorway nv 10x10" hold           Towel scrunches nv -                        Ther Activity                                       Gait Training                                       Modalities

## 2022-03-10 NOTE — PROGRESS NOTES
PT Re-Evaluation  and PT Discharge    Today's date: 3/10/2022  Patient name: Khushbu Lauren  : 1970  MRN: [id number]  Referring provider: Ahsley Gaspar DPM  Dx:   Encounter Diagnosis     ICD-10-CM    1  Plantar fasciitis  M72 2    2  Foot pain, bilateral  M79 671     M79 672    3  Plantar fasciitis of left foot  M72 2    4  Equinus contracture of left ankle  M24 572    5  Equinus contracture of right ankle  M24 571        Start Time: 1115  Stop Time: 1145  Total time in clinic (min): 30 minutes    Assessment  Assessment details: Pt is a 46 y/o female who presents to physical therapy w/ signs and symptoms consistent with gait deviations and pain related to confirmed heel spurs (L > R) in B feet  Since starting physical therapy the patient has improved with ankle ROM, pain levels, endurance and posture  Patient is able to amb and perform stairs with no pain  Patient is (I) with her HEP and ADLs  Due to noted improvements, the patient will be D/C from PT with a HEP  PT and patient agree with POC  Symptom irritability: lowUnderstanding of Dx/Px/POC: excellent  Goals  STG: In 2 weeks pt will:    1  Be independent w/ HEP (MET)  2  Decrease pain at worst from 6/10 pain to 0/10 consistently (MET)  3  Increase L ankle DF AROM from -2 degrees to 0 degrees(MET)    LTG: In 6 weeks (or per POC) pt will:    1  Increase B ankle eversion strength from 4/5 to 5/5 (in progress)  2  Walk for 50% of the day in her home with decreased pain in B feet (MET)  3  Increase FOTO score to 72 to demonstrate improved function in her home and community(MET)    Plan  Therapy options: D/C from PT  Frequency: 1x week  Duration in visits: 1  Plan of Care beginning date: 3/10/2022  Plan of Care expiration date: 3/10/2022        Subjective Evaluation    History of Present Illness  Date of onset: 2022  Mechanism of injury: Patient noted that the tingling occurs in the foot every once in a while and her LBP has returned   Patient noted she followed up with her physician and was cleared from PT  Patient feels that she is (I) with her HEP and ADLs  Recurrent probem    Quality of life: good    Pain  Current pain ratin  At best pain ratin  At worst pain ratin  Location: B feet L > R  Relieving factors: relaxation and rest  Aggravating factors: standing and walking  Progression: improved    Social Support  Lives in: multiple-level home  Lives with: spouse and adult children    Hand dominance: right      Diagnostic Tests  X-ray: abnormal  Treatments  Previous treatment: chiropractic and physical therapy  Current treatment: chiropractic and physical therapy  Patient Goals  Patient goal: "to walk in her home without pain or difficulty " (MET)        Objective     Static Posture     Ankle/Foot   Ankle/Foot (Left): Calcaneovalgus and pes planus  Ankle/Foot (Right): Calcaneovalgus  Observations     Additional Observation Details  Pes planus L > R  Calcaneovalgus (in WB) R > L  Fifth digit on each foot raised off of ground in neutral standing posture with shoes and socks doffed, indicating pronation of B feet in static stance    Active Range of Motion   Left Ankle/Foot   Dorsiflexion (ke): 0 degrees   Plantar flexion: 35 degrees   Inversion: 23 degrees   Eversion: 33 degrees   Great toe flexion: 55 degrees     Right Ankle/Foot   Dorsiflexion (ke): 0 degrees   Plantar flexion: 40 degrees   Inversion: 29 degrees   Eversion: 10 degrees   Great toe flexion: 50 degrees     Passive Range of Motion   Left Ankle/Foot    Dorsiflexion (ke): 0 degrees     Right Ankle/Foot    Dorsiflexion (ke): 5 degrees     Strength/Myotome Testing     Left Ankle/Foot   Dorsiflexion: 5  Plantar flexion: 5  Inversion: 5  Eversion: 4+  Great toe flexion: 4+    Right Ankle/Foot   Dorsiflexion: 5  Plantar flexion: 5  Inversion: 5  Eversion: 4+  Great toe flexion: 5    Tests   Left Ankle/Foot   Negative for windlass       Right Ankle/Foot   [facility]       Additional Tests Details  Calcaneal bump test positive B feet, indicating no pain despite confirmed heel spurs (L > R) in B feet             Precautions: Recent hx of skin cancer (removed 11/2021 and 12/2021)  Kj Isidro ACCESS CODE: [id number]      Manuals 2/10 2/16 2/23 3/3 3/10        Lumbar PA and pelvic rocking mobs    Grade  III  MW         Sacral rocking mobs    MW  Grade  III         Re-eval     MW                     Neuro Re-Ed             SLS nv -           Arch lifts nv 10x each           [place]  nv -  1 jar each foot         Scar massage education  MW/SB           HEP update and education  MW/SB MW MW MW                                  Ther Ex             Gastroc stretch 10x10" hold each -           Soleus stretch 10x10" hold each 10x10"hold           Heel raises 15x 20x           Ankle 4-way nv - GTB  2x10 ea (B)          bike nv -           Great toe stretch in doorway nv 10x10" hold           Towel scrunches nv -                        Ther Activity                                       Gait Training                                       Modalities

## 2022-05-12 ENCOUNTER — DOCUMENTATION (OUTPATIENT)
Dept: GYNECOLOGY | Facility: CLINIC | Age: 52
End: 2022-05-12

## 2022-05-12 NOTE — PROGRESS NOTES
Pt called c/o yeast infection symptoms vaginal itching slight white d/d  Is trying OTC meds with no much relief  Would like oral pill sent in  To [facility]      Please advise  Nancy did send treatmnet for pt  Pt was notied of medication and how to take it  Will call if no better with this course

## 2022-05-13 DIAGNOSIS — B37.9 YEAST INFECTION: Primary | ICD-10-CM

## 2022-05-13 RX ORDER — FLUCONAZOLE 150 MG/1
150 TABLET ORAL ONCE
Qty: 2 TABLET | Refills: 0 | Status: SHIPPED | OUTPATIENT
Start: 2022-05-13 | End: 2022-05-13

## 2022-05-16 ENCOUNTER — HOSPITAL ENCOUNTER (OUTPATIENT)
Dept: MAMMOGRAPHY | Facility: MEDICAL CENTER | Age: 52
Discharge: HOME | End: 2022-05-16
Payer: COMMERCIAL

## 2022-05-16 VITALS — WEIGHT: 157.63 LBS | BODY MASS INDEX: 26.26 KG/M2 | HEIGHT: 65 IN

## 2022-05-16 DIAGNOSIS — Z12.31 ENCOUNTER FOR SCREENING MAMMOGRAM FOR MALIGNANT NEOPLASM OF BREAST: ICD-10-CM

## 2022-05-16 PROCEDURE — 77063 BREAST TOMOSYNTHESIS BI: CPT

## 2022-05-16 PROCEDURE — 77067 SCR MAMMO BI INCL CAD: CPT

## 2022-06-07 ENCOUNTER — EVALUATION (OUTPATIENT)
Dept: PHYSICAL THERAPY | Facility: CLINIC | Age: 52
End: 2022-06-07
Payer: COMMERCIAL

## 2022-06-07 DIAGNOSIS — M79.672 LEFT FOOT PAIN: Primary | ICD-10-CM

## 2022-06-07 DIAGNOSIS — L90.5 SCAR OF FOREARM: ICD-10-CM

## 2022-06-07 PROCEDURE — 97140 MANUAL THERAPY 1/> REGIONS: CPT

## 2022-06-07 PROCEDURE — 97161 PT EVAL LOW COMPLEX 20 MIN: CPT

## 2022-06-07 NOTE — PROGRESS NOTES
PT Evaluation     Today's date: 2022  Patient name: Corazon Gonzalez  : 1970  MRN: [id number]  Referring provider: Quincy Chawla DPM  Dx:   Encounter Diagnosis     ICD-10-CM    1  Left foot pain  M79 672    2  Scar of forearm  L90 5        Start Time: 1645  Stop Time: 6493  Total time in clinic (min): 45 minutes    Assessment  Assessment details: Patient is a 48 yo female presenting to physical therapy with symptoms consistent with L heel pain and scar hypomobility on the L forearm that started about 3 weeks ago  Patient presents with decreased ankle ROM, decreased flexibility, decreased foot and knee strength, tissue hypomobility and gait deviations  Patient has increased difficulty with walking and standing  Patient notes no numbness or paraesthesias  PT performed IASTM to the calf and plantar aspect of the foot to assist c pain  Patient noted tenderness during  PT will address the noted impairments by performing knee and ankle strengthening, stretching, balance, scar massage, functional activities and manual techniques to allow the patient to return to her PLOF  PT recommended 1-2x/week for 4-6 weeks c a guarded prognosis 2* PLOF  Impairments: abnormal gait, abnormal or restricted ROM, activity intolerance, impaired balance, impaired physical strength, lacks appropriate home exercise program, pain with function, poor posture  and poor body mechanics    Symptom irritability: lowUnderstanding of Dx/Px/POC: good   Prognosis: good    Goals  STG: In four weeks the patient will:    1  Be (I) with her HEP  2  Increase ankle strength to 4+/5 MMT score to assist c ADLs  3  Increase L ankle dorsiflexion ROM to 0-10 degrees to assist c stairs  LTG: In six weeks, the patient will:    1  Increase FOTO score to 70 to demonstrate improvements in symptoms and function  2  Demonstrate full L ankle AROM without pain  3  amb 1/2 mile without pain     4  Increase hip and ankle strength to 4+/5 MMT score to assist c prolonged activities  5  Stand for 20 mins without pain  6  Note improvement in scar mobility  Plan  Patient would benefit from: skilled physical therapy and PT eval  Referral necessary: Yes  Planned modality interventions: cryotherapy and thermotherapy: hydrocollator packs  Planned therapy interventions: abdominal trunk stabilization, joint mobilization, manual therapy, massage, Jack taping, neuromuscular re-education, balance, body mechanics training, breathing training, patient education, postural training, strengthening, stretching, therapeutic activities, therapeutic exercise, transfer training, home exercise program, functional ROM exercises, gait training and flexibility  Frequency: 2x week  Duration in visits: 12  Duration in weeks: 6  Plan of Care beginning date: 2022  Plan of Care expiration date: 2022  Treatment plan discussed with: patient        Subjective Evaluation    History of Present Illness  Mechanism of injury: Patient noted she felt good after her last bout of PT, however in May the pain started to increase again in her L foot  Patient noted that she tried a steroid patch that did not help  Patient noted about 3 weeks ago she started to have a burning pain in the heel with walking  Patient is limited in walking and standing  Patient is now taking gabapentin, however she does not feel any affects  Patient had skin cancer removed on her L forearm in November and 2021  Patient noted the scar is tender and is restrictive  Patient would like to have scar massage performed to assist c the hypomobility             Recurrent probem    Quality of life: good    Pain  Current pain ratin  At best pain ratin  At worst pain ratin  Location: post tib L foot and heel  Quality: burning and tight  Relieving factors: medications, rest and ice  Aggravating factors: walking, standing and stair climbing  Progression: worsening    Social Support  Steps to enter house: yes  Stairs in house: yes   Lives in: multiple-level home  Lives with: spouse and adult children    Employment status: not working  Treatments  Previous treatment: injection treatment and medication  Patient Goals  Patient goals for therapy: increased strength, independence with ADLs/IADLs, return to sport/leisure activities, increased motion, improved balance and decreased pain  Patient goal: "to walk 1 mile without pain "        Objective     Observations   Left Ankle/Foot   Positive for edema  Additional Observation Details  Edema present along the post tib    Gait deviations: antalgic, lateral lean, walking on lateral portion of L foot  Tenderness   Left Ankle/Foot   Tenderness in the Achilles insertion, plantar fascia and posterior tibial tendon  No tenderness in the lateral malleolus, medial malleolus and peroneal tendon       Neurological Testing     Sensation     Ankle/Foot   Left Ankle/Foot   Intact: light touch    Right Ankle/Foot   Intact: light touch     Active Range of Motion   Left Ankle/Foot   Dorsiflexion (ke): -5 degrees with pain  Plantar flexion: 40 degrees   Inversion: 25 degrees   Eversion: 15 degrees     Right Ankle/Foot   Normal active range of motion    Strength/Myotome Testing     Left Hip   Planes of Motion   Flexion: 4  Extension: 4  Abduction: 4  Adduction: 4    Right Hip   Planes of Motion   Flexion: 4  Extension: 4  Abduction: 4  Adduction: 4    Left Knee   Flexion: 4  Extension: 4+    Right Knee   Flexion: 4  Extension: 4+    Left Ankle/Foot   Dorsiflexion: 4- (pain)  Plantar flexion: 4+  Inversion: 4  Eversion: 4  Great toe flexion: 4-  Great toe extension: 4-    Right Ankle/Foot   Dorsiflexion: 4+  Plantar flexion: 4+  Inversion: 4+  Eversion: 4+  Great toe flexion: 4-  Great toe extension: 4-    Additional Strength Details  Patient performed x10 DL heel raises with notes of pain starting on rep 1    General Comments:      Elbow Comments   Patient is TTP along the L forearm scar  This scar is very restricted and has some swelling on the proximal end         Flowsheet Rows    Flowsheet Row Most Recent Value   PT/OT G-Codes    Current Score 55   Projected Score 70   Assessment Type Evaluation             Precautions: none      Manuals 6/7            IASTM of calf, plantar fascia MW            IASTM and scar massage L forearm nv                                      Neuro Re-Ed             Heel raises x10            Arch lift nv            [place]  nv            Nerve glides nv                                                   Ther Ex             bike nv            Calf stretch  nv            Soleus stretch nv            Great toe stretch nv            Wrist flexion and extension stretch nv                                                   Ther Activity                                       Gait Training                                       Modalities

## 2022-06-07 NOTE — LETTER
2022    Bettie Walker, [address] [address]    Patient: Opal Murcia   YOB: 1970   Date of Visit: 2022     Encounter Diagnosis     ICD-10-CM    1  Left foot pain  M79 672    2  Scar of forearm  L90 5        Dear Dr Marlon Cohen: Thank you for your recent referral of Opal Murcia  Please review the attached evaluation summary from Zoraida's recent visit  Please verify that you agree with the plan of care by signing the attached order  If you have any questions or concerns, please do not hesitate to call  I sincerely appreciate the opportunity to share in the care of one of your patients and hope to have another opportunity to work with you in the near future  Sincerely,    Clari Jo, PT      Referring Provider:      I certify that I have read the below Plan of Care and certify the need for these services furnished under this plan of treatment while under my care  Bettie Walker DPM  [facility] [address] [address]  Via Fax: [phone]          PT Evaluation     Today's date: 2022  Patient name: Opal Murcia  : 1970  MRN: [id number]  Referring provider: Ronal Mcgee DPM  Dx:   Encounter Diagnosis     ICD-10-CM    1  Left foot pain  M79 672    2  Scar of forearm  L90 5        Start Time: 1645  Stop Time: 5135  Total time in clinic (min): 45 minutes    Assessment  Assessment details: Patient is a 48 yo female presenting to physical therapy with symptoms consistent with L heel pain and scar hypomobility on the L forearm that started about 3 weeks ago  Patient presents with decreased ankle ROM, decreased flexibility, decreased foot and knee strength, tissue hypomobility and gait deviations  Patient has increased difficulty with walking and standing  Patient notes no numbness or paraesthesias  PT performed IASTM to the calf and plantar aspect of the foot to assist c pain   Patient noted tenderness during  PT will address the noted impairments by performing knee and ankle strengthening, stretching, balance, scar massage, functional activities and manual techniques to allow the patient to return to her PLOF  PT recommended 1-2x/week for 4-6 weeks c a guarded prognosis 2* PLOF  Impairments: abnormal gait, abnormal or restricted ROM, activity intolerance, impaired balance, impaired physical strength, lacks appropriate home exercise program, pain with function, poor posture  and poor body mechanics    Symptom irritability: lowUnderstanding of Dx/Px/POC: good   Prognosis: good    Goals  STG: In four weeks the patient will:    1  Be (I) with her HEP  2  Increase ankle strength to 4+/5 MMT score to assist c ADLs  3  Increase L ankle dorsiflexion ROM to 0-10 degrees to assist c stairs  LTG: In six weeks, the patient will:    1  Increase FOTO score to 70 to demonstrate improvements in symptoms and function  2  Demonstrate full L ankle AROM without pain  3  amb 1/2 mile without pain  4  Increase hip and ankle strength to 4+/5 MMT score to assist c prolonged activities  5  Stand for 20 mins without pain  6  Note improvement in scar mobility          Plan  Patient would benefit from: skilled physical therapy and PT eval  Referral necessary: Yes  Planned modality interventions: cryotherapy and thermotherapy: hydrocollator packs  Planned therapy interventions: abdominal trunk stabilization, joint mobilization, manual therapy, massage, Jack taping, neuromuscular re-education, balance, body mechanics training, breathing training, patient education, postural training, strengthening, stretching, therapeutic activities, therapeutic exercise, transfer training, home exercise program, functional ROM exercises, gait training and flexibility  Frequency: 2x week  Duration in visits: 12  Duration in weeks: 6  Plan of Care beginning date: 6/7/2022  Plan of Care expiration date: 2022  Treatment plan discussed with: patient        Subjective Evaluation    History of Present Illness  Mechanism of injury: Patient noted she felt good after her last bout of PT, however in May the pain started to increase again in her L foot  Patient noted that she tried a steroid patch that did not help  Patient noted about 3 weeks ago she started to have a burning pain in the heel with walking  Patient is limited in walking and standing  Patient is now taking gabapentin, however she does not feel any affects  Patient had skin cancer removed on her L forearm in November and 2021  Patient noted the scar is tender and is restrictive  Patient would like to have scar massage performed to assist c the hypomobility  Recurrent probem    Quality of life: good    Pain  Current pain ratin  At best pain ratin  At worst pain ratin  Location: post tib L foot and heel  Quality: burning and tight  Relieving factors: medications, rest and ice  Aggravating factors: walking, standing and stair climbing  Progression: worsening    Social Support  Steps to enter house: yes  Stairs in house: yes   Lives in: multiple-level home  Lives with: spouse and adult children    Employment status: not working  Treatments  Previous treatment: injection treatment and medication  Patient Goals  Patient goals for therapy: increased strength, independence with ADLs/IADLs, return to sport/leisure activities, increased motion, improved balance and decreased pain  Patient goal: "to walk 1 mile without pain "        Objective     Observations   Left Ankle/Foot   Positive for edema  Additional Observation Details  Edema present along the post tib    Gait deviations: antalgic, lateral lean, walking on lateral portion of L foot  Tenderness   Left Ankle/Foot   Tenderness in the Achilles insertion, plantar fascia and posterior tibial tendon   No tenderness in the lateral malleolus, medial malleolus and peroneal tendon  Neurological Testing     Sensation     Ankle/Foot   Left Ankle/Foot   Intact: light touch    Right Ankle/Foot   Intact: light touch     Active Range of Motion   Left Ankle/Foot   Dorsiflexion (ke): -5 degrees with pain  Plantar flexion: 40 degrees   Inversion: 25 degrees   Eversion: 15 degrees     Right Ankle/Foot   Normal active range of motion    Strength/Myotome Testing     Left Hip   Planes of Motion   Flexion: 4  Extension: 4  Abduction: 4  Adduction: 4    Right Hip   Planes of Motion   Flexion: 4  Extension: 4  Abduction: 4  Adduction: 4    Left Knee   Flexion: 4  Extension: 4+    Right Knee   Flexion: 4  Extension: 4+    Left Ankle/Foot   Dorsiflexion: 4- (pain)  Plantar flexion: 4+  Inversion: 4  Eversion: 4  Great toe flexion: 4-  Great toe extension: 4-    Right Ankle/Foot   Dorsiflexion: 4+  Plantar flexion: 4+  Inversion: 4+  Eversion: 4+  Great toe flexion: 4-  Great toe extension: 4-    Additional Strength Details  Patient performed x10 DL heel raises with notes of pain starting on rep 1    General Comments:      Elbow Comments   Patient is TTP along the L forearm scar  This scar is very restricted and has some swelling on the proximal end         Flowsheet Rows    Flowsheet Row Most Recent Value   PT/OT G-Codes    Current Score 55   Projected Score 70   Assessment Type Evaluation             Precautions: none      Manuals 6/7            IASTM of calf, plantar fascia MW            IASTM and scar massage L forearm nv                                      Neuro Re-Ed             Heel raises x10            Arch lift nv            [place]  nv            Nerve glides nv                                                   Ther Ex             bike nv            Calf stretch  nv            Soleus stretch nv            Great toe stretch nv            Wrist flexion and extension stretch nv                                                   Ther Activity                                       Gait Training                                       Modalities

## 2022-06-09 ENCOUNTER — OFFICE VISIT (OUTPATIENT)
Dept: PHYSICAL THERAPY | Facility: CLINIC | Age: 52
End: 2022-06-09
Payer: COMMERCIAL

## 2022-06-09 DIAGNOSIS — M79.672 LEFT FOOT PAIN: Primary | ICD-10-CM

## 2022-06-09 DIAGNOSIS — L90.5 SCAR OF FOREARM: ICD-10-CM

## 2022-06-09 PROCEDURE — 97140 MANUAL THERAPY 1/> REGIONS: CPT

## 2022-06-09 PROCEDURE — 97110 THERAPEUTIC EXERCISES: CPT

## 2022-06-09 PROCEDURE — 97112 NEUROMUSCULAR REEDUCATION: CPT

## 2022-06-10 NOTE — PROGRESS NOTES
Daily Note     Today's date: 2022  Patient name: Niurka Davila  : 1970  MRN: [id number]  Referring provider: Annalisa Boone DPM  Dx:   Encounter Diagnosis     ICD-10-CM    1  Left foot pain  M79 672    2  Scar of forearm  L90 5        Start Time: 1000  Stop Time: 1030  Total time in clinic (min): 30 minutes    Subjective: Patient noted that she was a little sore after last session  Patient noted she is not having as much burning pain today  Objective: See treatment diary below      Assessment: PT performed IASTM to the forearm as well as the L plantar fascia  PT noted improvement post session  Patient was TTP on the posterior tibialis tendon  Patient performed stretches with min VCs  PT added to her HEP  Patient would benefit from continued PT to allow the patient to return to her PLOF  Plan: Continue per plan of care        Precautions: none      Manuals            IASTM of calf, plantar fascia MW MW + post tib           IASTM and scar massage L forearm nv MW                                     Neuro Re-Ed             Heel raises x10 x10           Arch lift nv            [place]  nv            Nerve glides nv            HEP edu  MW                                     Ther Ex             bike nv            Calf stretch  nv 3x30" ea           Soleus stretch nv            Great toe stretch nv            Wrist flexion and extension stretch nv 3x30" ea                                                  Ther Activity                                       Gait Training                                       Modalities

## 2022-06-17 ENCOUNTER — OFFICE VISIT (OUTPATIENT)
Dept: PHYSICAL THERAPY | Facility: CLINIC | Age: 52
End: 2022-06-17
Payer: COMMERCIAL

## 2022-06-17 DIAGNOSIS — L90.5 SCAR OF FOREARM: ICD-10-CM

## 2022-06-17 DIAGNOSIS — M79.672 LEFT FOOT PAIN: Primary | ICD-10-CM

## 2022-06-17 PROCEDURE — 97140 MANUAL THERAPY 1/> REGIONS: CPT

## 2022-06-17 PROCEDURE — 97112 NEUROMUSCULAR REEDUCATION: CPT

## 2022-06-17 NOTE — PROGRESS NOTES
Daily Note     Today's date: 2022  Patient name: Wesley Miller  : 1970  MRN: [id number]  Referring provider: Stan Calero DPM  Dx:   Encounter Diagnosis     ICD-10-CM    1  Left foot pain  M79 672    2  Scar of forearm  L90 5        Start Time: 1000  Stop Time: 1030  Total time in clinic (min): 30 minutes    Subjective: Patient noted that she did a lot of walking this past week and noted minimal soreness  Patient noted the last day she had some pain  Patient noted her scar on there forearm looks better  Objective: See treatment diary below      Assessment: PT continues to note improvements post IASTM in the post tib and calf musculature  Patient noted improvements post session  PT educated the patient on her HEP  PT performed scar massage to the forearm to promote healing and blood flow  PT noted improvements in flexibility post session  Patient would benefit from continued PT to allow the patient to return to her PLOF  Plan: Continue per plan of care        Precautions: none      Manuals           IASTM of calf, plantar fascia MW MW + post tib MW + post tib          IASTM and scar massage L forearm nv MW MW                                    Neuro Re-Ed             Heel raises x10 x10           Arch lift nv            [place]  nv            Nerve glides nv            HEP edu  MW MW                                    Ther Ex             bike nv            Calf stretch  nv 3x30" ea           Soleus stretch nv            Great toe stretch nv            Wrist flexion and extension stretch nv 3x30" ea                                                  Ther Activity                                       Gait Training                                       Modalities

## 2022-06-23 ENCOUNTER — OFFICE VISIT (OUTPATIENT)
Dept: PHYSICAL THERAPY | Facility: CLINIC | Age: 52
End: 2022-06-23
Payer: COMMERCIAL

## 2022-06-23 DIAGNOSIS — L90.5 SCAR OF FOREARM: ICD-10-CM

## 2022-06-23 DIAGNOSIS — M79.672 LEFT FOOT PAIN: Primary | ICD-10-CM

## 2022-06-23 PROCEDURE — 97140 MANUAL THERAPY 1/> REGIONS: CPT

## 2022-06-23 PROCEDURE — 97112 NEUROMUSCULAR REEDUCATION: CPT

## 2022-06-23 PROCEDURE — 97110 THERAPEUTIC EXERCISES: CPT

## 2022-06-23 NOTE — PROGRESS NOTES
Daily Note     Today's date: 2022  Patient name: Marshal Montoya  : 1970  MRN: [id number]  Referring provider: Mile Samayoa DPM  Dx:   Encounter Diagnosis     ICD-10-CM    1  Left foot pain  M79 672    2  Scar of forearm  L90 5        Start Time: 1540  Stop Time: 1615  Total time in clinic (min): 35 minutes    Subjective: Patient noted that she had increased foot pain for 3 day after last session, however on the 4th day she could walk on it all day without pain  Patient noted today it is slightly sore, however it is much better  Patient noted her forearm scar has greatly improved too  Objective: See treatment diary below      Assessment: PT continues to perform IASTM to the forearm and calf/plantar aspect of the foot to assist c pain  PT noted less trigger points in the calf and posterior tib musculature  Patient noted less tenderness during the manual techniques  Patient continues to improve with scar mobility on the forearm post manuals  PT educated the patient on stretches to perform post session as well as icing the foot at home for 10 mins  Patient would benefit from continued PT to allow the patient to return to her PLOF  Plan: Continue per plan of care        Precautions: none      Manuals          IASTM of calf, plantar fascia MW MW + post tib MW + post tib MW + post tib         IASTM and scar massage L forearm nv MW MW MW                                   Neuro Re-Ed             Heel raises x10 x10  x10         Arch lift nv            [place]  nv            Nerve glides nv            HEP edu  MW MW MW                                   Ther Ex             bike nv            Calf stretch  nv 3x30" ea  3x30" ea         Soleus stretch nv   3x30" ea         Great toe stretch nv            Wrist flexion and extension stretch nv 3x30" ea  3x30" ea                                                Ther Activity                                       Gait Training Modalities

## 2022-06-30 ENCOUNTER — OFFICE VISIT (OUTPATIENT)
Dept: PHYSICAL THERAPY | Facility: CLINIC | Age: 52
End: 2022-06-30
Payer: COMMERCIAL

## 2022-06-30 DIAGNOSIS — M79.672 LEFT FOOT PAIN: Primary | ICD-10-CM

## 2022-06-30 DIAGNOSIS — L90.5 SCAR OF FOREARM: ICD-10-CM

## 2022-06-30 PROCEDURE — 97112 NEUROMUSCULAR REEDUCATION: CPT

## 2022-06-30 PROCEDURE — 97140 MANUAL THERAPY 1/> REGIONS: CPT

## 2022-06-30 PROCEDURE — 97110 THERAPEUTIC EXERCISES: CPT

## 2022-06-30 NOTE — PROGRESS NOTES
Daily Note     Today's date: 2022  Patient name: Niurka Davila  : 1970  MRN: [id number]  Referring provider: Annalisa Boone DPM  Dx:   Encounter Diagnosis     ICD-10-CM    1  Left foot pain  M79 672    2  Scar of forearm  L90 5        Start Time: 815  Stop Time: 09  Total time in clinic (min): 45 minutes    Subjective: Patient noted that she had no foot pain after last session until Tuesday and it was very minimal  Patient noted that her scar is improving on her forearm too  Objective: See treatment diary below      Assessment: Patient continues to improve with scar mobility on the forearm post manuals  Patient also presented with less tenderness during IASTM which is an improvement  Patient continues to perform stretches with improved flexibility  Patient is making good progress 2* improved function at home  Patient would benefit from continued PT to allow the patient to return to her PLOF  Plan: Continue per plan of care        Precautions: none      Manuals         IASTM of calf, plantar fascia MW MW + post tib MW + post tib MW + post tib MW + post tib        IASTM and scar massage L forearm nv MW MW MW MW                                  Neuro Re-Ed             Heel raises x10 x10  x10 x10        Arch lift nv            [place]  nv            Nerve glides nv            HEP edu  MW MW MW MW                                  Ther Ex             bike nv            Calf stretch  nv 3x30" ea  3x30" ea 3x30"ea        Soleus stretch nv   3x30" ea 3x30" ea        Great toe stretch nv            Wrist flexion and extension stretch nv 3x30" ea  3x30" ea 3x30" ea                                               Ther Activity                                       Gait Training                                       Modalities

## 2022-07-07 ENCOUNTER — OFFICE VISIT (OUTPATIENT)
Dept: PHYSICAL THERAPY | Facility: CLINIC | Age: 52
End: 2022-07-07
Payer: COMMERCIAL

## 2022-07-07 DIAGNOSIS — L90.5 SCAR OF FOREARM: ICD-10-CM

## 2022-07-07 DIAGNOSIS — M79.672 LEFT FOOT PAIN: Primary | ICD-10-CM

## 2022-07-07 PROCEDURE — 97140 MANUAL THERAPY 1/> REGIONS: CPT

## 2022-07-07 PROCEDURE — 97112 NEUROMUSCULAR REEDUCATION: CPT

## 2022-07-07 NOTE — PROGRESS NOTES
Daily Note     Today's date: 2022  Patient name: Estefanía Sheth  : 1970  MRN: [id number]  Referring provider: Maldonado Glasgow DPM  Dx:   Encounter Diagnosis     ICD-10-CM    1  Left foot pain  M79 672    2  Scar of forearm  L90 5        Start Time:   Stop Time:   Total time in clinic (min): 40 minutes    Subjective: Patient noted that she is not taking the gabapentin anymore due to it messing with her thyroid levels  Patient noted she met with the podiatrist and he noted good progress  Patient noted that she felt good this week, however the week before she felt better  Patient was still able to complete her ADLs with minimal discomfort  Objective: See treatment diary below      Assessment: PT performed IASTM to the forearm as well as plantar aspect of the L foot  Patient provided verbal consent for treatment  Patient noted less tenderness during the session  PT continues to note improvements with tissue mobility on the arm and foot  PT educated on icing post session for inflammation  Patient would benefit from continued PT to allow the patient to return to her PLOF  Plan: Continue per plan of care        Precautions: none      Manuals        IASTM of calf, plantar fascia MW MW + post tib MW + post tib MW + post tib MW + post tib MW + post tib       IASTM and scar massage L forearm nv MW MW MW MW MW                                 Neuro Re-Ed             Heel raises x10 x10  x10 x10        Arch lift nv            [place]  nv            Nerve glides nv            HEP edu  MW MW MW MW MW                                 Ther Ex             bike nv            Calf stretch  nv 3x30" ea  3x30" ea 3x30"ea        Soleus stretch nv   3x30" ea 3x30" ea        Great toe stretch nv            Wrist flexion and extension stretch nv 3x30" ea  3x30" ea 3x30" ea                                               Ther Activity                                       Gait Training                                       Modalities

## 2022-07-14 ENCOUNTER — OFFICE VISIT (OUTPATIENT)
Dept: PHYSICAL THERAPY | Facility: CLINIC | Age: 52
End: 2022-07-14
Payer: COMMERCIAL

## 2022-07-14 DIAGNOSIS — M79.672 LEFT FOOT PAIN: Primary | ICD-10-CM

## 2022-07-14 DIAGNOSIS — L90.5 SCAR OF FOREARM: ICD-10-CM

## 2022-07-14 PROCEDURE — 97112 NEUROMUSCULAR REEDUCATION: CPT

## 2022-07-14 PROCEDURE — 97140 MANUAL THERAPY 1/> REGIONS: CPT

## 2022-07-14 NOTE — PROGRESS NOTES
Daily Note     Today's date: 2022  Patient name: Sonam Alvarenga  : 1970  MRN: [id number]  Referring provider: Lucho Amato DPM  Dx:   Encounter Diagnosis     ICD-10-CM    1  Left foot pain  M79 672    2  Scar of forearm  L90 5        Start Time: 1200  Stop Time: 1240  Total time in clinic (min): 40 minutes    Subjective: Patient noted that she continues to see improvements with her forearms scar  Patient is off of Gabapentin now and is feeling more back pain and foot pain at times  Patient noted that the pain is manageable and is intermittent  Objective: See treatment diary below      Assessment: PT performed IASTM to the forearm and L plantar aspect of the foot  Patient provided verbal consent for treatment as well as to have Luci Chang, SPT to be in the session  PT noted less trigger points in the posterior tib tendon which is an improvement  Patient noted less tenderness during IASTM  Patient would benefit from continued PT to allow the patient to return to her PLOF  Plan: Continue per plan of care        Precautions: none      Manuals       IASTM of calf, plantar fascia MW MW + post tib MW + post tib MW + post tib MW + post tib MW + post tib MW  + post tib      IASTM and scar massage L forearm nv MW MW MW MW MW MW                                Neuro Re-Ed             Heel raises x10 x10  x10 x10        Arch lift nv            [place]  nv            Nerve glides nv            HEP edu  MW MW MW MW MW MW                                Ther Ex             bike nv            Calf stretch  nv 3x30" ea  3x30" ea 3x30"ea        Soleus stretch nv   3x30" ea 3x30" ea        Great toe stretch nv            Wrist flexion and extension stretch nv 3x30" ea  3x30" ea 3x30" ea                                               Ther Activity                                       Gait Training                                       Modalities

## 2022-07-21 ENCOUNTER — OFFICE VISIT (OUTPATIENT)
Dept: PHYSICAL THERAPY | Facility: CLINIC | Age: 52
End: 2022-07-21
Payer: COMMERCIAL

## 2022-07-21 DIAGNOSIS — M79.672 LEFT FOOT PAIN: Primary | ICD-10-CM

## 2022-07-21 DIAGNOSIS — L90.5 SCAR OF FOREARM: ICD-10-CM

## 2022-07-21 PROCEDURE — 97140 MANUAL THERAPY 1/> REGIONS: CPT

## 2022-07-21 PROCEDURE — 97112 NEUROMUSCULAR REEDUCATION: CPT

## 2022-07-21 NOTE — PROGRESS NOTES
Daily Note     Today's date: 2022  Patient name: Sy Lynn  : 1970  MRN: [id number]  Referring provider: Marta Newby DPM  Dx:   Encounter Diagnosis     ICD-10-CM    1  Left foot pain  M79 672    2  Scar of forearm  L90 5        Start Time: 1500  Stop Time: 1545  Total time in clinic (min): 45 minutes    Subjective: Patient noted that her heel pain is better, however she has some pain at times  Patient is able to complete her ADLs  Patient noted a little soreness in the forearm after last session  Objective: See treatment diary below      Assessment: PT continues to perform IASTM with verbal consent provided by the patient  Patient continues to present with less trigger points and inflammation during the session  PT continues to educate the patient on performing her strengthening exercises to assist c pain  Patient would benefit from continued PT to allow the patient to return to her PLOF  Plan: Continue per plan of care        Precautions: none      Manuals      IASTM of calf, plantar fascia MW MW + post tib MW + post tib MW + post tib MW + post tib MW + post tib MW  + post tib MW + post tib     IASTM and scar massage L forearm nv MW MW MW MW MW MW MW                               Neuro Re-Ed             Heel raises x10 x10  x10 x10        Arch lift nv            [place]  nv            Nerve glides nv            HEP edu  MW MW MW MW MW MW MW                               Ther Ex             bike nv            Calf stretch  nv 3x30" ea  3x30" ea 3x30"ea        Soleus stretch nv   3x30" ea 3x30" ea        Great toe stretch nv            Wrist flexion and extension stretch nv 3x30" ea  3x30" ea 3x30" ea                                               Ther Activity                                       Gait Training                                       Modalities

## 2022-07-26 ENCOUNTER — OFFICE VISIT (OUTPATIENT)
Dept: PHYSICAL THERAPY | Facility: CLINIC | Age: 52
End: 2022-07-26
Payer: COMMERCIAL

## 2022-07-26 DIAGNOSIS — L90.5 SCAR OF FOREARM: ICD-10-CM

## 2022-07-26 DIAGNOSIS — M79.672 LEFT FOOT PAIN: Primary | ICD-10-CM

## 2022-07-26 PROCEDURE — 97112 NEUROMUSCULAR REEDUCATION: CPT

## 2022-07-26 PROCEDURE — 97140 MANUAL THERAPY 1/> REGIONS: CPT

## 2022-07-26 NOTE — PROGRESS NOTES
Daily Note     Today's date: 2022  Patient name: Adrian Elizondo  : 1970  MRN: [id number]  Referring provider: Stacey Mcdaniel DPM  Dx:   Encounter Diagnosis     ICD-10-CM    1  Left foot pain  M79 672    2  Scar of forearm  L90 5        Start Time: 1045  Stop Time: 1120  Total time in clinic (min): 35 minutes    Subjective: Patient notes that she has had increased pain in heel over the past couple days but has still been able to complete ADLs  Patient presents to clinic with slight limp  Objective: See treatment diary below      Assessment: SPT performed IASTM to L forearm and L calf + post tib + plantar fascia with verbal consent from patient  PT educated patient on continuing to perform stretches at home  Patient would benefit from continued PT to return to her PLOF  Plan: Follow up in 2 weeks for possible discharge from PT        Precautions: none      Manuals     IASTM of calf, plantar fascia MW MW + post tib MW + post tib MW + post tib MW + post tib MW + post tib MW  + post tib MW + post tib [address] + post tib    IASTM and scar massage L forearm nv MW MW MW MW MW MW MW [address]                              Neuro Re-Ed             Heel raises x10 x10  x10 x10        Arch lift nv            [place]  nv            Nerve glides nv            HEP edu  MW MW MW MW MW MW MW MW                              Ther Ex             bike nv            Calf stretch  nv 3x30" ea  3x30" ea 3x30"ea        Soleus stretch nv   3x30" ea 3x30" ea        Great toe stretch nv            Wrist flexion and extension stretch nv 3x30" ea  3x30" ea 3x30" ea                                               Ther Activity                                       Gait Training                                       Modalities

## 2022-07-28 ENCOUNTER — APPOINTMENT (OUTPATIENT)
Dept: PHYSICAL THERAPY | Facility: CLINIC | Age: 52
End: 2022-07-28
Payer: COMMERCIAL

## 2022-08-10 ENCOUNTER — OFFICE VISIT (OUTPATIENT)
Dept: GYNECOLOGY | Facility: CLINIC | Age: 52
End: 2022-08-10
Payer: COMMERCIAL

## 2022-08-10 VITALS
SYSTOLIC BLOOD PRESSURE: 118 MMHG | HEART RATE: 88 BPM | DIASTOLIC BLOOD PRESSURE: 72 MMHG | WEIGHT: 170 LBS | HEIGHT: 65 IN | BODY MASS INDEX: 28.32 KG/M2

## 2022-08-10 DIAGNOSIS — L90.0 LICHEN SCLEROSUS: Primary | ICD-10-CM

## 2022-08-10 PROCEDURE — 99212 OFFICE O/P EST SF 10 MIN: CPT | Performed by: OBSTETRICS & GYNECOLOGY

## 2022-08-10 RX ORDER — CLOBETASOL PROPIONATE 0.5 MG/G
CREAM TOPICAL 2 TIMES DAILY
Qty: 30 G | Refills: 2 | Status: SHIPPED | OUTPATIENT
Start: 2022-08-10

## 2022-08-10 NOTE — PROGRESS NOTES
Assessment/Plan:         Diagnoses and all orders for this visit:    Lichen sclerosus  -     clobetasol (TEMOVATE) 0 05 % cream; Apply topically 2 (two) times a day; if no improvement she will return to the office        Subjective:      Patient ID: Francesco Johnson is a 47 y o  female  HPI patient presents to the office complaining of vulvar itching over the past 2-3 months  It is fairly persistent  She denies any vaginal discharge  She has noticed some whitish discoloration  She denies any fever  Denies any dysuria or urinary incontinence  She had called in the office in May bleeding that she had a yeast infection  She was placed on Diflucan with basically no improvement    The following portions of the patient's history were reviewed and updated as appropriate:   She  has a past medical history of Cancer ([facility] [address] ) ( 11/2021, 12/2021), Contraceptive use, and Missed ab  She There are no problems to display for this patient  She  has a past surgical history that includes Urethral prolapse excision; Gallbladder surgery; Skin cancer excision (Left); Skin cancer excision (Left, 3rd week of 11/2021, beginning of 12/2021); and Skin biopsy (Left, 11/2021)  Her family history includes Breast cancer (age of onset: 72) in her maternal grandmother; Heart attack in her father and sister; Heart disease in her mother; Lymphoma (age of onset: 48) in her sister; No Known Problems in her daughter, maternal aunt, maternal grandfather, paternal aunt, paternal grandfather, paternal grandmother, and sister  She  reports that she has never smoked  She has never used smokeless tobacco  She reports previous alcohol use  She reports that she does not use drugs    Current Outpatient Medications   Medication Sig Dispense Refill    clobetasol (TEMOVATE) 0 05 % cream Apply topically 2 (two) times a day 30 g 2    Cholecalciferol 2000 units TABS Take 1 tablet by mouth      Magnesium Oxide 400 MG CAPS Apply 1 capsule topically      thyroid (ARMOUR) 120 MG tablet Take 120 mg by mouth daily       No current facility-administered medications for this visit  Current Outpatient Medications on File Prior to Visit   Medication Sig    Cholecalciferol 2000 units TABS Take 1 tablet by mouth    Magnesium Oxide 400 MG CAPS Apply 1 capsule topically    thyroid (ARMOUR) 120 MG tablet Take 120 mg by mouth daily     No current facility-administered medications on file prior to visit  She has No Known Allergies       Review of Systems      Objective:      /72   Pulse 88   Ht 5' 5" (1 651 m)   Wt 77 1 kg (170 lb)   BMI 28 29 kg/m²          Physical Exam  Vitals reviewed  Abdominal:      General: There is no distension  Palpations: Abdomen is soft  There is no mass  Tenderness: There is no abdominal tenderness  There is no guarding or rebound  Hernia: No hernia is present  There is no hernia in the left inguinal area or right inguinal area  Genitourinary:     General: Normal vulva  Vagina: Normal       Cervix: Normal       Uterus: Normal        Adnexa:         Right: No mass, tenderness or fullness  Left: No mass, tenderness or fullness  Comments: Whitish discoloration the upper labia bilaterally along with whitish discoloration saritha clitoral consistent with lichen sclerosis  Lymphadenopathy:      Lower Body: No right inguinal adenopathy  No left inguinal adenopathy  Neurological:      Mental Status: She is alert

## 2022-08-11 ENCOUNTER — OFFICE VISIT (OUTPATIENT)
Dept: PHYSICAL THERAPY | Facility: CLINIC | Age: 52
End: 2022-08-11
Payer: COMMERCIAL

## 2022-08-11 DIAGNOSIS — M79.672 LEFT FOOT PAIN: Primary | ICD-10-CM

## 2022-08-11 DIAGNOSIS — L90.5 SCAR OF FOREARM: ICD-10-CM

## 2022-08-11 PROCEDURE — 97110 THERAPEUTIC EXERCISES: CPT

## 2022-08-11 NOTE — PROGRESS NOTES
Daily Note     Today's date: 2022  Patient name: Ruben Cornelius  : 1970  MRN: [id number]  Referring provider: Lisette Homans, DPM  Dx:   Encounter Diagnosis     ICD-10-CM    1  Left foot pain  M79 672    2  Scar of forearm  L90 5        Start Time: 1030  Stop Time: 1105  Total time in clinic (min): 35 minutes    Subjective: Patient noted that she was on vacation last week and noted that her arm is improving  Patient noted that she does have pain in the foot  Patient noted a 4/10 pain  Objective: See treatment diary below      Assessment: PT continues to perform IASTM to the forearm and plantar fascia   PT noted great improvements in the forearm scar 2* less hypomobility on the distal end of the scar  PT noted increased trigger points on the posterior tib muscle, however after manuals this improved  PT also noted less pain post manuals  PT educated the patient on stretching within her ranges  Patient would benefit from continued PT to allow the patient to return to her PLOF  Plan: Continue per plan of care        Precautions: none      Manuals    IASTM of calf, plantar fascia MW MW + post tib MW + post tib MW + post tib MW + post tib MW + post tib MW  + post tib MW + post tib [address] + post tib MW +post tib   IASTM and scar massage L forearm nv MW MW MW MW MW MW MW [address] MW                             Neuro Re-Ed             Heel raises x10 x10  x10 x10        Arch lift nv            [place]  nv            Nerve glides nv            HEP edu  MW MW MW MW MW MW MW MW MW                             Ther Ex             bike nv            Calf stretch  nv 3x30" ea  3x30" ea 3x30"ea        Soleus stretch nv   3x30" ea 3x30" ea        Great toe stretch nv            Wrist flexion and extension stretch nv 3x30" ea  3x30" ea 3x30" ea                                               Ther Activity                                       Gait Training Modalities

## 2022-08-25 ENCOUNTER — OFFICE VISIT (OUTPATIENT)
Dept: PHYSICAL THERAPY | Facility: CLINIC | Age: 52
End: 2022-08-25
Payer: COMMERCIAL

## 2022-08-25 DIAGNOSIS — M79.672 LEFT FOOT PAIN: Primary | ICD-10-CM

## 2022-08-25 DIAGNOSIS — L90.5 SCAR OF FOREARM: ICD-10-CM

## 2022-08-25 PROCEDURE — 97112 NEUROMUSCULAR REEDUCATION: CPT

## 2022-08-25 PROCEDURE — 97140 MANUAL THERAPY 1/> REGIONS: CPT

## 2022-08-25 NOTE — PROGRESS NOTES
Daily Note     Today's date: 2022  Patient name: Tri Ni  : 1970  MRN: [id number]  Referring provider: Moriah Duong DPM  Dx:   Encounter Diagnosis     ICD-10-CM    1  Left foot pain  M79 672    2  Scar of forearm  L90 5        Start Time: 1100  Stop Time: 1135  Total time in clinic (min): 35 minutes    Subjective: Patient noted that she started ART therapy with a chiropractor last week and noted improvements with the IASTM from PT  Patient noted that her foot feels better  Patient noted that she is also seeing a good improvement in her scar on her forearm  Patient noted her pain is at a minimum and is able to complete ADLs  Objective: See treatment diary below      Assessment: PT performed IASTM to the forearm and plantar fascia to assist c pain  PT noted a decrease in trigger points and improvements in tissue mobility  PT educated the patient on a IASTM device for the patient to use at home to assist c progress  PT educated the patient on her HEP  Patient would benefit from continued PT to allow the patient to return to her PLOF  Plan: Continue per plan of care  Patient will perform a HEP and follow up in a month        Precautions: none      Manuals    IASTM of calf, plantar fascia MW + post tib    MW + post tib MW + post tib MW  + post tib MW + post tib [address] + post tib MW +post tib   IASTM and scar massage L forearm MW    MW MW MW MW [address] MW                             Neuro Re-Ed             Heel raises     x10        Arch lift             [place]              Nerve glides             HEP edu MW + IASTM home tool    MW MW MW MW MW MW                             Ther Ex             bike             Calf stretch      3x30"ea        Soleus stretch     3x30" ea        Great toe stretch             Wrist flexion and extension stretch     3x30" ea                                               Ther Activity Gait Training                                       Modalities

## 2022-11-29 ENCOUNTER — TELEPHONE (OUTPATIENT)
Dept: GYNECOLOGY | Facility: CLINIC | Age: 52
End: 2022-11-29

## 2022-11-29 NOTE — TELEPHONE ENCOUNTER
Pt called stating she cannot tell if she is having a LS flare up or yeast infection  She is experiencing some itching, but mostly burning and redness  She tried monistat cream which  Burned so bad she took it off  No discharge only the slight itch  Please advise

## 2022-12-07 ENCOUNTER — OFFICE VISIT (OUTPATIENT)
Dept: GYNECOLOGY | Facility: CLINIC | Age: 52
End: 2022-12-07

## 2022-12-07 VITALS
HEIGHT: 65 IN | SYSTOLIC BLOOD PRESSURE: 112 MMHG | DIASTOLIC BLOOD PRESSURE: 78 MMHG | BODY MASS INDEX: 27.86 KG/M2 | WEIGHT: 167.2 LBS | HEART RATE: 73 BPM

## 2022-12-07 DIAGNOSIS — Z01.419 ENCOUNTER FOR GYNECOLOGICAL EXAMINATION WITHOUT ABNORMAL FINDING: Primary | ICD-10-CM

## 2022-12-07 DIAGNOSIS — Z01.419 ENCOUNTER FOR GYNECOLOGICAL EXAMINATION WITH PAPANICOLAOU SMEAR OF CERVIX: ICD-10-CM

## 2022-12-07 DIAGNOSIS — Z12.31 ENCOUNTER FOR SCREENING MAMMOGRAM FOR MALIGNANT NEOPLASM OF BREAST: ICD-10-CM

## 2022-12-07 DIAGNOSIS — L90.0 LICHEN SCLEROSUS: ICD-10-CM

## 2022-12-07 NOTE — PROGRESS NOTES
Assessment/Plan:       Diagnoses and all orders for this visit:    Encounter for gynecological examination without abnormal finding    Encounter for screening mammogram for malignant neoplasm of breast  -     Mammo screening bilateral w 3d & cad; Future    Lichen sclerosus      HRT: managed by Dr Jakob Chavez    Subjective:      Patient ID: Mariola Guardado is a 47 y o  female  HPI patient presents for annual examination  She offers no complaints  She recently had a lichen sclerosus flare  She is clobetasol  Symptoms have nearly completely resolved  She is presently on hormone replacement therapy  This is managed by Dr Jakob Chavez  She denies any vaginal irritation, burning discharge or bleeding  Her last episode of bleeding was in May 2022  Denies any dysuria, hematuria urgency or urinary incontinence  No GI complaints  Colonoscopy March 2021: Normal: Repeat in 10 years    Osteoporosis screening via he will scan December 2021: 0 3    The following portions of the patient's history were reviewed and updated as appropriate:   She  has a past medical history of Cancer ([facility] [address] ) ( 11/2021, 12/2021), Contraceptive use, and Missed ab  She There are no problems to display for this patient  She  has a past surgical history that includes Urethral prolapse excision; Gallbladder surgery; Skin cancer excision (Left); Skin cancer excision (Left, 3rd week of 11/2021, beginning of 12/2021); and Skin biopsy (Left, 11/2021)  Her family history includes Breast cancer (age of onset: 72) in her maternal grandmother; Heart attack in her father and sister; Heart disease in her mother; Lymphoma (age of onset: 48) in her sister; No Known Problems in her daughter, maternal aunt, maternal grandfather, paternal aunt, paternal grandfather, paternal grandmother, and sister  She  reports that she has never smoked  She has never used smokeless tobacco  She reports that she does not currently use alcohol   She reports that she does not use drugs   Current Outpatient Medications   Medication Sig Dispense Refill   • Cholecalciferol 2000 units TABS Take 1 tablet by mouth     • clobetasol (TEMOVATE) 0 05 % cream Apply topically 2 (two) times a day 30 g 2   • Magnesium Oxide 400 MG CAPS Apply 1 capsule topically     • thyroid (ARMOUR) 120 MG tablet Take 120 mg by mouth daily       No current facility-administered medications for this visit  Current Outpatient Medications on File Prior to Visit   Medication Sig   • Cholecalciferol 2000 units TABS Take 1 tablet by mouth   • clobetasol (TEMOVATE) 0 05 % cream Apply topically 2 (two) times a day   • Magnesium Oxide 400 MG CAPS Apply 1 capsule topically   • thyroid (ARMOUR) 120 MG tablet Take 120 mg by mouth daily     No current facility-administered medications on file prior to visit  She has No Known Allergies       Review of Systems   Constitutional: Negative  HENT: Negative for sore throat and trouble swallowing  Gastrointestinal: Negative  Genitourinary: Negative  Objective:      /78   Pulse 73   Ht 5' 5" (1 651 m)   Wt 75 8 kg (167 lb 3 2 oz)   LMP 05/01/2022   BMI 27 82 kg/m²          Physical Exam  Vitals reviewed  Cardiovascular:      Rate and Rhythm: Normal rate and regular rhythm  Pulses: Normal pulses  Heart sounds: Normal heart sounds  Pulmonary:      Effort: Pulmonary effort is normal  No respiratory distress  Breath sounds: Normal breath sounds  Chest:   Breasts:     Right: No swelling, bleeding, inverted nipple, mass, nipple discharge, skin change or tenderness  Left: No swelling, bleeding, inverted nipple, mass, nipple discharge, skin change or tenderness  Abdominal:      General: There is no distension  Palpations: Abdomen is soft  There is no mass  Tenderness: There is no abdominal tenderness  There is no guarding or rebound  Hernia: No hernia is present   There is no hernia in the left inguinal area or right inguinal area  Genitourinary:     General: Normal vulva  Labia:         Right: No rash, tenderness or lesion  Left: No rash, tenderness or lesion  Vagina: Normal       Cervix: Normal       Uterus: Normal        Adnexa:         Right: No mass, tenderness or fullness  Left: No mass, tenderness or fullness  Musculoskeletal:      Cervical back: Normal range of motion and neck supple  No tenderness  Lymphadenopathy:      Cervical: No cervical adenopathy  Upper Body:      Right upper body: No supraclavicular, axillary or pectoral adenopathy  Left upper body: No supraclavicular, axillary or pectoral adenopathy  Lower Body: No right inguinal adenopathy  No left inguinal adenopathy  Neurological:      Mental Status: She is alert

## 2022-12-19 LAB
LAB AP GYN PRIMARY INTERPRETATION: NORMAL
Lab: NORMAL

## 2023-05-04 ENCOUNTER — OFFICE VISIT (OUTPATIENT)
Dept: GYNECOLOGY | Facility: CLINIC | Age: 53
End: 2023-05-04

## 2023-05-04 VITALS
DIASTOLIC BLOOD PRESSURE: 80 MMHG | SYSTOLIC BLOOD PRESSURE: 130 MMHG | WEIGHT: 170.2 LBS | BODY MASS INDEX: 28.32 KG/M2 | HEART RATE: 100 BPM

## 2023-05-04 DIAGNOSIS — N64.4 BREAST PAIN, RIGHT: Primary | ICD-10-CM

## 2023-05-04 NOTE — PROGRESS NOTES
Assessment/Plan:         Diagnoses and all orders for this visit:    Breast pain, right; will check diagnostic right mammogram and ultrasound if needed  Patient will decrease caffeine intake  She will start on vitamin E 400 to 800 units daily        Subjective:      Patient ID: Asiya Yusuf is a 47 y o  female  HPI patient presents the office complaining of right breast pain  This has been intermittent over the past few weeks  This is aggravated with pressure or with palpation  She denies any breast trauma or discharge from either breast   No fever  Maternal grandmother breast cancer  Patient is presently on bioidentical hormones  This is managed by Dr Brenda Villarreal  Last mammogram was in May 2022 and was benign  The following portions of the patient's history were reviewed and updated as appropriate:   She  has a past medical history of Cancer ([facility] [address] ) ( 11/2021, 12/2021), Contraceptive use, Disease of thyroid gland, and Missed ab  She There are no problems to display for this patient  She  has a past surgical history that includes Urethral prolapse excision; Gallbladder surgery; Skin cancer excision (Left); Skin cancer excision (Left, 3rd week of 11/2021, beginning of 12/2021); Skin biopsy (Left, 11/2021); and Cholecystectomy  Her family history includes Breast cancer in her maternal grandmother; Heart attack in her father and sister; Heart disease in her mother; Lymphoma (age of onset: 48) in her sister; No Known Problems in her daughter, maternal aunt, maternal grandfather, paternal aunt, paternal grandfather, paternal grandmother, and sister  She  reports that she has never smoked  She has never used smokeless tobacco  She reports that she does not currently use alcohol  She reports that she does not use drugs    Current Outpatient Medications   Medication Sig Dispense Refill    Cholecalciferol 2000 units TABS Take 1 tablet by mouth      clobetasol (TEMOVATE) 0 05 % cream Apply topically 2 (two) times a day 30 g 2    Magnesium Oxide 400 MG CAPS Apply 1 capsule topically      thyroid (ARMOUR) 120 MG tablet Take 120 mg by mouth daily       No current facility-administered medications for this visit  Current Outpatient Medications on File Prior to Visit   Medication Sig    Cholecalciferol 2000 units TABS Take 1 tablet by mouth    clobetasol (TEMOVATE) 0 05 % cream Apply topically 2 (two) times a day    Magnesium Oxide 400 MG CAPS Apply 1 capsule topically    thyroid (ARMOUR) 120 MG tablet Take 120 mg by mouth daily     No current facility-administered medications on file prior to visit  She has No Known Allergies       Review of Systems      Objective:      /80   Pulse 100   Wt 77 2 kg (170 lb 3 2 oz)   LMP 05/22/2022 Comment: 5/22/22  BMI 28 32 kg/m²          Physical Exam  Vitals reviewed  Chest:   Breasts:     Right: Tenderness present  No swelling, bleeding, inverted nipple, mass, nipple discharge or skin change  Left: No swelling, bleeding, inverted nipple, mass, nipple discharge, skin change or tenderness  Comments: Dense breast tissue greater on the right than the left  Lymphadenopathy:      Upper Body:      Right upper body: No supraclavicular, axillary or pectoral adenopathy  Left upper body: No supraclavicular, axillary or pectoral adenopathy  Neurological:      Mental Status: She is alert

## 2023-06-07 ENCOUNTER — HOSPITAL ENCOUNTER (OUTPATIENT)
Dept: ULTRASOUND IMAGING | Facility: CLINIC | Age: 53
Discharge: HOME | End: 2023-06-07
Payer: COMMERCIAL

## 2023-06-07 ENCOUNTER — HOSPITAL ENCOUNTER (OUTPATIENT)
Dept: MAMMOGRAPHY | Facility: CLINIC | Age: 53
Discharge: HOME | End: 2023-06-07
Payer: COMMERCIAL

## 2023-06-07 VITALS — WEIGHT: 170 LBS | BODY MASS INDEX: 28.32 KG/M2 | HEIGHT: 65 IN

## 2023-06-07 DIAGNOSIS — N64.4 BREAST PAIN: ICD-10-CM

## 2023-06-07 PROCEDURE — 77066 DX MAMMO INCL CAD BI: CPT

## 2023-06-07 PROCEDURE — 76642 ULTRASOUND BREAST LIMITED: CPT

## 2023-06-07 PROCEDURE — G0279 TOMOSYNTHESIS, MAMMO: HCPCS

## 2023-08-30 ENCOUNTER — HOSPITAL ENCOUNTER (OUTPATIENT)
Dept: ULTRASOUND IMAGING | Facility: HOSPITAL | Age: 53
Discharge: HOME | End: 2023-08-30
Attending: OBSTETRICS & GYNECOLOGY
Payer: COMMERCIAL

## 2023-08-30 DIAGNOSIS — I51.9 MYXEDEMA HEART DISEASE: ICD-10-CM

## 2023-08-30 DIAGNOSIS — R68.82 DECREASED LIBIDO: ICD-10-CM

## 2023-08-30 DIAGNOSIS — N95.0 POSTMENOPAUSAL BLEEDING: ICD-10-CM

## 2023-08-30 DIAGNOSIS — N95.8 POSTARTIFICIAL MENOPAUSAL SYNDROME: ICD-10-CM

## 2023-08-30 DIAGNOSIS — E03.9 MYXEDEMA HEART DISEASE: ICD-10-CM

## 2023-08-30 DIAGNOSIS — G47.9 REPETITIVE INTRUSIONS OF SLEEP: ICD-10-CM

## 2023-08-30 DIAGNOSIS — M79.7 SCAPULOHUMERAL FIBROSITIS: ICD-10-CM

## 2023-08-30 PROCEDURE — 76830 TRANSVAGINAL US NON-OB: CPT

## 2023-08-30 PROCEDURE — 76856 US EXAM PELVIC COMPLETE: CPT

## 2023-09-06 ENCOUNTER — OFFICE VISIT (OUTPATIENT)
Dept: GYNECOLOGY | Facility: CLINIC | Age: 53
End: 2023-09-06
Payer: COMMERCIAL

## 2023-09-06 VITALS
HEART RATE: 93 BPM | SYSTOLIC BLOOD PRESSURE: 120 MMHG | BODY MASS INDEX: 28.99 KG/M2 | WEIGHT: 174.2 LBS | DIASTOLIC BLOOD PRESSURE: 78 MMHG

## 2023-09-06 DIAGNOSIS — R10.2 PELVIC PAIN: Primary | ICD-10-CM

## 2023-09-06 DIAGNOSIS — N30.10 INTERSTITIAL CYSTITIS: ICD-10-CM

## 2023-09-06 DIAGNOSIS — N64.4 BREAST PAIN: ICD-10-CM

## 2023-09-06 PROCEDURE — 99214 OFFICE O/P EST MOD 30 MIN: CPT | Performed by: OBSTETRICS & GYNECOLOGY

## 2023-09-06 NOTE — PROGRESS NOTES
Assessment/Plan:         Diagnoses and all orders for this visit:    Pelvic pain; likely secondary to IC flare. Since the pain is nearly subsided plan is to continue to follow. If the pain becomes worse she will contact the office for bladder instillation    Interstitial cystitis    Breast pain ; consultation with Dr. Jumana Lopez        Subjective:      Patient ID: Rainer Brown is a 47 y.o. female. HPI patient presents the office complaining of pelvic pain and persistent right breast pain. Approximate 2 and half weeks ago she began to experience pelvic pain where the fairly abrupt onset. The pain is diminished to just cramping. She thought that perhaps this was an IC flare and with dietary modification the pain is nearly resolved. She had no associated nausea, vomiting diarrhea. She does have a intermittent constipation. Her last colonoscopy was approximately 2-1/2 years ago and was normal.  She is due to follow-up with gastroenterology later today. She denies any dysuria, hematuria or urgency. She did have increased frequency of urination. With the dietary modification the IC flare has seemed to have resolved. Patient is presently on hormone replacement therapy with Dr. Lexy Purcell. He ordered an ultrasound. This was done on August 30. Ultrasound was normal with endometrium of 3 mm. There was overlying gas so there was some difficulty visualizing the left ovary. Patient also has having persistent right inner quadrant breast discomfort for the past 6 months. She had a mammogram with an ultrasound done on June 7. This was negative. Patient has decreased her caffeine intake she is presently on vitamin E. The following portions of the patient's history were reviewed and updated as appropriate:   She  has a past medical history of Cancer ([address]) ( 11/2021, 12/2021), Contraceptive use, Disease of thyroid gland, and Missed ab. She There are no problems to display for this patient.     She  has a past surgical history that includes Urethral prolapse excision; Gallbladder surgery; Skin cancer excision (Left); Skin cancer excision (Left, 3rd week of 11/2021, beginning of 12/2021); Skin biopsy (Left, 11/2021); and Cholecystectomy. Her family history includes Breast cancer (age of onset: 61) in her maternal grandmother; Heart attack in her father and sister; Heart disease in her mother; Lymphoma (age of onset: 48) in her sister; No Known Problems in her daughter, maternal aunt, maternal grandfather, paternal aunt, paternal grandfather, paternal grandmother, and sister. She  reports that she has never smoked. She has never used smokeless tobacco. She reports that she does not currently use alcohol. She reports that she does not use drugs. Current Outpatient Medications   Medication Sig Dispense Refill   • Cholecalciferol 2000 units TABS Take 1 tablet by mouth     • clobetasol (TEMOVATE) 0.05 % cream Apply topically 2 (two) times a day 30 g 2   • Magnesium Oxide 400 MG CAPS Apply 1 capsule topically     • thyroid (ARMOUR) 120 MG tablet Take 120 mg by mouth daily       No current facility-administered medications for this visit. Current Outpatient Medications on File Prior to Visit   Medication Sig   • Cholecalciferol 2000 units TABS Take 1 tablet by mouth   • clobetasol (TEMOVATE) 0.05 % cream Apply topically 2 (two) times a day   • Magnesium Oxide 400 MG CAPS Apply 1 capsule topically   • thyroid (ARMOUR) 120 MG tablet Take 120 mg by mouth daily     No current facility-administered medications on file prior to visit. She has No Known Allergies. .    Review of Systems   Constitutional: Negative. Gastrointestinal:        See HPI   Genitourinary: Positive for frequency and pelvic pain. Negative for difficulty urinating, dysuria, flank pain, hematuria, menstrual problem and vaginal discharge.          Objective:      /78   Pulse 93   Wt 79 kg (174 lb 3.2 oz)   LMP 05/22/2022 Comment: 5/22/22  BMI 28.99 kg/m²          Physical Exam  Vitals reviewed. Chest:   Breasts:     Right: Tenderness present. No swelling, bleeding, inverted nipple, mass, nipple discharge or skin change. Left: No swelling, bleeding, inverted nipple, mass, nipple discharge, skin change or tenderness. Abdominal:      General: There is no distension. Palpations: Abdomen is soft. There is no mass. Tenderness: There is no abdominal tenderness. There is no guarding or rebound. Hernia: No hernia is present. There is no hernia in the left inguinal area or right inguinal area. Genitourinary:     General: Normal vulva. Labia:         Right: No rash, tenderness or lesion. Left: No rash, tenderness or lesion. Vagina: Normal.      Cervix: Normal.      Uterus: Normal.       Adnexa:         Right: No mass, tenderness or fullness. Left: No mass, tenderness or fullness. Lymphadenopathy:      Upper Body:      Right upper body: No supraclavicular, axillary or pectoral adenopathy. Left upper body: No supraclavicular, axillary or pectoral adenopathy. Lower Body: No right inguinal adenopathy. No left inguinal adenopathy. Neurological:      Mental Status: She is alert.

## 2023-09-29 ENCOUNTER — CONSULT (OUTPATIENT)
Dept: SURGICAL ONCOLOGY | Facility: CLINIC | Age: 53
End: 2023-09-29
Payer: COMMERCIAL

## 2023-09-29 VITALS
DIASTOLIC BLOOD PRESSURE: 79 MMHG | WEIGHT: 173 LBS | RESPIRATION RATE: 14 BRPM | HEIGHT: 65 IN | BODY MASS INDEX: 28.82 KG/M2 | SYSTOLIC BLOOD PRESSURE: 145 MMHG | TEMPERATURE: 98.6 F | HEART RATE: 82 BPM | OXYGEN SATURATION: 98 %

## 2023-09-29 DIAGNOSIS — N64.4 BREAST PAIN: Primary | ICD-10-CM

## 2023-09-29 PROCEDURE — 99204 OFFICE O/P NEW MOD 45 MIN: CPT

## 2023-09-29 RX ORDER — METHOCARBAMOL 750 MG/1
TABLET, FILM COATED ORAL
COMMUNITY

## 2023-09-29 RX ORDER — GABAPENTIN 100 MG/1
CAPSULE ORAL
COMMUNITY

## 2023-09-29 RX ORDER — NALTREXONE HYDROCHLORIDE 50 MG/1
TABLET, FILM COATED ORAL
COMMUNITY
Start: 2023-08-10

## 2023-09-29 NOTE — PROGRESS NOTES
Surgical Oncology Follow Up       [facility] SURGICAL ONCOLOGY RUTHANNAlexandriaCARLYLE Velasquez  [place] [address]    Sunil Molt  1970  [id number]  [facility] SURGICAL ONCOLOGY [place] [id number]    Chief Complaint   Patient presents with   • New Patient Visit       Assessment/Plan:  1. Breast pain  - EPO, decrease salt/caffeine, warm compress, do not palpate       Discussion/Summary: Patient is a 56-year-old female presenting for a consult for right breast pain. She notes this pain began in April of this year. She had a bilateral diagnostic mammogram and ultrasound of the right breast on 6/7/2023 which was benign, BIRADS 2 category 1 density. She was referred by her GYN. She was instructed to decrease caffeine intake and she is on vitamin E. Her lifetime TC is 7%. She denies trauma to the breast or medication changes. Patient has been on hormone replacement therapy since 2019. Patient notes breast pain is present on palpation only at the 3 o'clock position 3 cm from the nipple of the right breast. She did not have pain today during my clinical exam.  In reviewing her imaging, patient has an area of increased dense breast tissue in this area. I am suspicious that this pain is r/t fibrocystic changes and possibly hormone replacement. I am recommending she try EPO orally as she stated that she was using it topically. I encouraged warm compress and a supportive bra. Additionally, I recommended she decrease salt and caffeine intake. She can take aleve for inflammation if she needed. There were no concerns on her clinical exam. She was encouraged to call with increased pain, masses, skin changes, nipple changes or adenopathy on exam. All questions were answered today.     History of Present Illness:     Oncology History    No history exists.        -Interval History: Patient is a 56-year-old female presenting for a consult for right breast pain at the 3 o'clock position. She had a bilateral diagnostic mammogram and ultrasound of the right breast on 6/7/2023 which was benign, BIRADS 2 category 1 density. She denies trauma to the breast or medication changes. Patient has been on hormone replacement therapy since 2019. She has a family hx of breast cancer in her maternal grandmother. Age of menarche was 16years old. She's had 7 pregnancies and 5 live births. First child was born at 26. She is post-menopausal. She has a hx of melanoma in 2021. She has never had genetic testing and is not of Ashkenazi Mosque descent. Review of Systems:  Review of Systems   Constitutional: Negative for activity change, appetite change, fatigue and unexpected weight change. Respiratory: Negative for cough and shortness of breath. Cardiovascular: Negative for chest pain. Gastrointestinal: Negative for abdominal pain, diarrhea, nausea and vomiting. Endocrine: Negative for heat intolerance. Musculoskeletal: Positive for back pain. Negative for arthralgias and myalgias. Skin: Negative for rash. Neurological: Negative for weakness and headaches. Hematological: Negative for adenopathy.        Patient Active Problem List   Diagnosis   • Breast pain     Past Medical History:   Diagnosis Date   • Cancer ([address])  11/2021, 12/2021    Melanoma in [place]    • Contraceptive use    • Disease of thyroid gland    • Missed ab      Past Surgical History:   Procedure Laterality Date   • CHOLECYSTECTOMY     • GALLBLADDER SURGERY     • SKIN BIOPSY Left 11/2021   • SKIN CANCER EXCISION Left     left arm melanoma and pre cancer   • SKIN CANCER EXCISION Left 3rd week of 11/2021, beginning of 12/2021   • URETHRAL PROLAPSE EXCISION       Family History   Problem Relation Age of Onset   • Heart disease Mother    • Heart attack Father    • Heart attack Sister    • Lymphoma Sister 48   • No Known Problems Sister    • No Known Problems Daughter    • Breast cancer Maternal Grandmother 60   • No Known Problems Maternal Grandfather    • No Known Problems Paternal Grandmother    • No Known Problems Paternal Grandfather    • No Known Problems Maternal Aunt    • No Known Problems Paternal Aunt    • BRCA2 Positive Neg Hx    • BRCA2 Negative Neg Hx    • BRCA1 Positive Neg Hx    • BRCA1 Negative Neg Hx    • BRCA 1/2 Neg Hx    • Ovarian cancer Neg Hx    • Endometrial cancer Neg Hx    • Colon cancer Neg Hx    • Breast cancer additional onset Neg Hx      Social History     Socioeconomic History   • Marital status: /Civil Union     Spouse name: Not on file   • Number of children: Not on file   • Years of education: Not on file   • Highest education level: Not on file   Occupational History   • Not on file   Tobacco Use   • Smoking status: Never   • Smokeless tobacco: Never   Vaping Use   • Vaping Use: Never used   Substance and Sexual Activity   • Alcohol use: Not Currently     Comment: SOCIAL   • Drug use: No   • Sexual activity: Yes     Partners: Male     Birth control/protection: Male Sterilization   Other Topics Concern   • Not on file   Social History Narrative   • Not on file     Social Determinants of Health     Financial Resource Strain: Not on file   Food Insecurity: Not on file   Transportation Needs: Not on file   Physical Activity: Not on file   Stress: Not on file   Social Connections: Not on file   Intimate Partner Violence: Not on file   Housing Stability: Not on file       Current Outpatient Medications:   •  Cholecalciferol 2000 units TABS, Take 1 tablet by mouth, Disp: , Rfl:   •  clobetasol (TEMOVATE) 0.05 % cream, Apply topically 2 (two) times a day, Disp: 30 g, Rfl: 2  •  diclofenac sodium (VOLTAREN) 50 mg EC tablet, diclofenac sodium 50 mg tablet,delayed release  TAKE 1 TABLET BY MOUTH TWICE DAILY WITH MEALS, Disp: , Rfl:   •  Magnesium Oxide 400 MG CAPS, Apply 1 capsule topically, Disp: , Rfl:   •  naltrexone (REVIA) 50 mg tablet, Please compound 6 mg tablet. Take 1 tablet a day., Disp: , Rfl:   •  thyroid (ARMOUR) 120 MG tablet, Take 120 mg by mouth daily, Disp: , Rfl:   •  gabapentin (NEURONTIN) 100 mg capsule, gabapentin 100 mg capsule (Patient not taking: Reported on 9/29/2023), Disp: , Rfl:   •  methocarbamol (ROBAXIN) 750 mg tablet, methocarbamol 750 mg tablet (Patient not taking: Reported on 9/29/2023), Disp: , Rfl:   No Known Allergies  Vitals:    09/29/23 1302   BP: 145/79   Pulse: 82   Resp: 14   Temp: 98.6 °F (37 °C)   SpO2: 98%       Physical Exam  Constitutional:       General: She is not in acute distress. Appearance: Normal appearance. Cardiovascular:      Rate and Rhythm: Normal rate and regular rhythm. Pulses: Normal pulses. Heart sounds: Normal heart sounds. Pulmonary:      Effort: Pulmonary effort is normal.      Breath sounds: Normal breath sounds. Chest:      Chest wall: No mass. Breasts:     Right: No swelling, bleeding, inverted nipple, mass, nipple discharge, skin change or tenderness. Left: No swelling, bleeding, inverted nipple, mass, nipple discharge, skin change or tenderness. Abdominal:      General: Abdomen is flat. Palpations: Abdomen is soft. Lymphadenopathy:      Upper Body:      Right upper body: No supraclavicular, axillary or pectoral adenopathy. Left upper body: No supraclavicular, axillary or pectoral adenopathy. Skin:     General: Skin is warm. Neurological:      General: No focal deficit present. Mental Status: She is alert and oriented to person, place, and time. Psychiatric:         Mood and Affect: Mood normal.         Behavior: Behavior normal.           Results:   Imaging  US pelvis complete w transvaginal    Result Date: 9/8/2023  Narrative: PELVIC ULTRASOUND, COMPLETE INDICATION:  The patient is 47years old.   N95.8: Other specified menopausal and perimenopausal disorders R68.82: Decreased libido E03.9: Hypothyroidism, unspecified I51.9: Heart disease, unspecified G47.9: Sleep disorder, unspecified M79.7: Fibromyalgia N95.0: Postmenopausal bleeding. COMPARISON: None TECHNIQUE:   Transabdominal pelvic ultrasound was performed in sagittal and transverse planes with a curvilinear transducer. Additional transvaginal imaging was performed to better evaluate the endometrium and ovaries. Imaging included volumetric sweeps as well as traditional still imaging technique. FINDINGS: UTERUS: The uterus is anteverted in position, measuring 9.3 x 3.7 x 5.1 cm. The uterus has a mildly bulbous contour and coarsened heterogeneous echotexture, suggesting adenomyosis. The cervix appears within normal limits. ENDOMETRIUM: The endometrial echo complex has an AP caliber of 3.0 mm. Its appearance is within normal limits for age and cycle and shows no filling defects. OVARIES/ADNEXA: Right ovary:  1.7 x 1.9 x 1.0 cm. 1.8 mL. Left ovary:  1.2 x 1.2 x 1.5 cm. 1.1 mL. Ovarian Doppler flow is within normal limits. No suspicious ovarian or adnexal abnormality. OTHER: No free fluid or loculated fluid collections. Impression: Findings suggesting adenomyosis. Workstation performed: [id number]       I reviewed the above imaging data. Advance Care Planning/Advance Directives:  Discussed disease status, cancer treatment plans and/or cancer treatment goals with the patient.

## 2024-03-05 ENCOUNTER — OFFICE VISIT (OUTPATIENT)
Dept: OBGYN CLINIC | Facility: CLINIC | Age: 54
End: 2024-03-05
Payer: COMMERCIAL

## 2024-03-05 DIAGNOSIS — G47.9 SLEEP DISTURBANCE: ICD-10-CM

## 2024-03-05 DIAGNOSIS — E27.0 ADRENAL CORTEX HYPERFUNCTION (HCC): ICD-10-CM

## 2024-03-05 DIAGNOSIS — N95.1 MENOPAUSAL SYMPTOMS: Primary | ICD-10-CM

## 2024-03-05 PROCEDURE — 99215 OFFICE O/P EST HI 40 MIN: CPT | Performed by: OBSTETRICS & GYNECOLOGY

## 2024-03-06 NOTE — PROGRESS NOTES
Zoraida presents for hormonal consultation/ second opinion, her first visit with me; self referred, sees Dr. Veliz for gyn care.  Zoraida is menopausal for 2 years, has been seeing Dr. Benjamin for past 5 years for BHRT. Evaluation extensive, but paying for  model now cumbersome. He has been ordering blood testing every 6 months alternating with saliva from [facility] labs every 6 months, which she finds a little much, she brings her last ZRT profile today for review.   She is on: Biest cream 50:50 2.5 mg/ progesterone 15 mg/ml, apply 1 ml daily, along with oral compounded PG  mg daily. Despite this, she complains of:  1) Hot flashes, mild, but they persist  2) Insomnia.   3) Chronic SI joint pain, treated with: CBD, naltrexone, PRP (one injection, scheduled for another this month), acupuncture, nerve hydrodissection, PT, pelvic floor PT. Surgery discussed, she does not prefer. Rheum eval neg, but is convinced there is an autoimmune component to pain. No other joints affected. Also has severe plantar fasciitis with chronic foot pain.

## 2024-03-06 NOTE — PROGRESS NOTES
Assessment/Plan:       Diagnoses and all orders for this visit:    Menopausal symptoms    Adrenal cortex hyperfunction (HCC)    Sleep disturbance        1) This was a lengthy visit spent discussing the HPATG (hypothalamus/pituitary/adrenal/thyroid/gonadal) axis and the impact that hormonal deviation in one gland can have on another. It is not uncommon for ovarian declined to coincide or invoke thyroid and adrenal dysfunction as well. Adrenal activation noted in higher cortisol levels by previous testing, the cause of her insomnia.  2) She is already well versed in the risks and benefits of HRT. I offered to combine higher PG dose to Biest cream to avoid taking concurrent high dose oral compounded. She agrees, new script for: Biest 50:50 2.5 mg/ progesterone 100 mg /ml 1 ml daily.  3)Consider different adrenal adaptagen, sent in Stress Balance from Laura from [employer]. No other changes in supplement list needed, she is on too many already.   4) Email me with progress report in 1 month, and determine future follow up up from there. I believe her joint pain is structural not autoimmune, she has done a great job in her evaluation. Is surgery the answer....  5)  Follow up prn.    This was a 60 minute visit with greater than 50% of time spent in face to face counseling and coordination of care.      Subjective:      Patient ID: Zoraida Schultz is a 54 y.o. female.    Zoraida presents for hormonal consultation/ second opinion, her first visit with me; self referred, sees Dr. Veliz for gyn care.  Zoraida is menopausal for 2 years, has been seeing Dr. Benjamin for past 5 years for BHRT. Evaluation extensive, but paying for  model now cumbersome. He has been ordering blood testing every 6 months alternating with saliva from Z[facility] labs every 6 months, which she finds a little much, she brings her last ZRT profile today for review.   She is on: Biest cream 50:50 2.5 mg/ progesterone 15 mg/ml, apply 1 ml daily,  "along with oral compounded PG  mg daily. Despite this, she complains of:  1) Hot flashes, mild, but they persist  2) Insomnia.   3) Chronic SI joint pain, treated with: CBD, naltrexone, PRP (one injection, scheduled for another this month), acupuncture, nerve hydrodissection, PT, pelvic floor PT. Surgery discussed, she does not prefer. Rheum eval neg, but is convinced there is an autoimmune component to pain. No other joints affected. Also has severe plantar fasciitis with chronic foot pain.   She takes many supplements: Vit D/K2, Ca, Rhemannia, \"immune plex\" Ox bile, Mag calm, thyrotropin, Zn, selenium,   PMHX: Hashimotos hypothyroid; interstitial cystitis; lichen sclerosus; SI joint dysfunction as above; melanoma; plantar fasciitis  SHX: Denies Tobacco, ETOH  FHX: Mom COD COPD, Afib; MGM Breast Ca dx in 60s; MGF unknown. Dad COD AMI 66, HTN, DM2. PGF AMI. 6 siblings, one sister COD AMI 49. 1 sister melanoma. 5 kids, ages 18-28, healthy        Review of Systems   Constitutional: Negative.    Gastrointestinal: Negative.    Endocrine: Positive for heat intolerance.   Genitourinary:  Positive for frequency.   Musculoskeletal:  Positive for back pain.        Hip pain  Plantar fasciitis   Psychiatric/Behavioral:  Positive for sleep disturbance. The patient is nervous/anxious.        Objective:      LMP 05/22/2022 Comment: 5/22/22         Physical Exam  Constitutional:       Appearance: Normal appearance.   Neurological:      Mental Status: She is alert.         "

## 2024-03-29 DIAGNOSIS — N95.1 MENOPAUSAL SYMPTOMS: ICD-10-CM

## 2024-04-29 ENCOUNTER — EVALUATION (OUTPATIENT)
Age: 54
End: 2024-04-29
Payer: COMMERCIAL

## 2024-04-29 DIAGNOSIS — M54.50 CHRONIC BILATERAL LOW BACK PAIN WITHOUT SCIATICA: ICD-10-CM

## 2024-04-29 DIAGNOSIS — M25.561 CHRONIC PAIN OF RIGHT KNEE: Primary | ICD-10-CM

## 2024-04-29 DIAGNOSIS — G89.29 CHRONIC PAIN OF LEFT KNEE: ICD-10-CM

## 2024-04-29 DIAGNOSIS — M25.562 CHRONIC PAIN OF LEFT KNEE: ICD-10-CM

## 2024-04-29 DIAGNOSIS — G89.29 CHRONIC BILATERAL LOW BACK PAIN WITHOUT SCIATICA: ICD-10-CM

## 2024-04-29 DIAGNOSIS — G89.29 CHRONIC PAIN OF RIGHT KNEE: Primary | ICD-10-CM

## 2024-04-29 PROCEDURE — 97112 NEUROMUSCULAR REEDUCATION: CPT | Performed by: PHYSICAL THERAPIST

## 2024-04-29 PROCEDURE — 97161 PT EVAL LOW COMPLEX 20 MIN: CPT | Performed by: PHYSICAL THERAPIST

## 2024-04-29 NOTE — PROGRESS NOTES
PT Evaluation     Today's date: 2024  Patient name: Zoraida Schultz  : 1970  MRN: [id number]  Referring provider: Roxann Hagen, PT  Dx:   Encounter Diagnosis     ICD-10-CM    1. Chronic pain of right knee  M25.561     G89.29       2. Chronic pain of left knee  M25.562     G89.29       3. Chronic bilateral low back pain without sciatica  M54.50     G89.29           Start Time: 0800  Stop Time: 0900  Total time in clinic (min): 60 minutes    Assessment  Assessment details: Patient is a 53 yo female presenting to her direct access physical therapy evaluation with symptoms consistent with (B) knee pain R > L that started about 6 weeks ago. Patient presents with decreased hip and knee strength, fair posture and decreased endurance. Patient has increased difficulty with steps, walking and kneeling. PT will address the noted impairments by performing hip and knee strengthening, stretching, balance, functional activities and manual techniques to allow the patient to return to her PLOF. PT recommended 2x/week for 4-6 weeks c a good prognosis 2* PLOF.    Impairments: abnormal or restricted ROM, activity intolerance, impaired balance, impaired physical strength, lacks appropriate home exercise program, pain with function, poor posture  and poor body mechanics    Goals  STG: In four weeks the patient will:    1. Be (I) with her HEP.  2. Increase hip and knee strength to 4+/5 MMT score to assist c ADLs.  3. Improve patellar mobility to assist c steps.       LTG: In six weeks, the patient will:    1. Increase FOTO score to 67 to demonstrate improvements in symptoms and function.  2. Demonstrate full R knee AROM without pain.  3. Amb 1-2 miles without pain.   4. Increase hip and knee strength to 5/5 MMT score to assist c prolonged activities.  5. Amb 4 miles with < 2/10 pain  6. Negotiate a flight of steps without pain.        Plan  Patient would benefit from: skilled physical therapy and PT eval  Planned modality  "interventions: cryotherapy and thermotherapy: hydrocollator packs  Planned therapy interventions: abdominal trunk stabilization, IASTM, joint mobilization, kinesiology taping, manual therapy, massage, Jack taping, balance, body mechanics training, breathing training, neuromuscular re-education, patient education, postural training, strengthening, stretching, therapeutic activities, therapeutic exercise, transfer training, home exercise program, gait training, functional ROM exercises and flexibility  Frequency: 2x week  Duration in visits: 12  Duration in weeks: 6  Plan of Care beginning date: 2024  Plan of Care expiration date: 6/10/2024  Treatment plan discussed with: patient        Subjective Evaluation    History of Present Illness  Mechanism of injury: Patient noted about 6 weeks ago she started to notice R knee pain. Patient noted that tried some exercises, went to [facility] and had a steroid injection. And then the left knee started to increase in pain. Patient noted increased pain with the steps descending < ascending and walking fast. Patient noted that the steriod injection helped, however it did not complete take the pain away. Patient noted that she had an ultrasound on her knee by a physician that is potentially performing back surgery on her and they noted that she had a R meniscal tear. Patient noted a hx of PRP to her L plantar fascia due to pain in 2024 and was NWB for 8 days. Patient noted that she might have back surgery due to pain for over 4 years. Patient noted in general she has a tough time kneeling.     Patient Goals  Patient goals for therapy: increased strength, independence with ADLs/IADLs, return to sport/leisure activities, increased motion, improved balance and decreased pain  Patient goal: \"to walk 4 miles without pain.\" \"to negotiate steps without pain.\"  Pain  Current pain ratin  At best pain ratin  At worst pain ratin  Location: R > L knee; R quad  Quality: dull " ache and knife-like  Relieving factors: ice and rest (ice 3x/day; tried ibuprophen, however is not taking currently)  Aggravating factors: walking, standing and stair climbing  Progression: worsening    Social Support  Steps to enter house: yes  Stairs in house: yes   Lives in: multiple-level home  Lives with: spouse and adult children    Employment status: not working  Treatments  Previous treatment: injection treatment        Objective     Postural Observations  Seated posture: fair  Standing posture: fair      Tenderness   Left Knee   Tenderness in the pes anserinus.     Right Knee   Tenderness in the MCL (distal), MCL (proximal), medial joint line and pes anserinus.     Mobility   Patellar Mobility:   Left Knee   WFL: superior and inferior.   Hypomobile: left medial and left lateral    Right Knee   Hypomobile: medial, lateral, superior and inferior   Tibiofemoral Mobility:   Left knee   Tibiofemoral tendons within functional limits include the anterior  Right knee Hypomobile in the anterior tibiofemoral tendon(s).     Strength/Myotome Testing     Left Hip   Planes of Motion   Flexion: 4  Extension: 4-  Abduction: 4-  Adduction: 4    Right Hip   Planes of Motion   Flexion: 4  Extension: 4-  Abduction: 4-  Adduction: 4    Left Knee   Flexion: 4  Extension: 4    Right Knee   Flexion: 4-  Extension: 4-  Quadriceps contraction: fair    Left Ankle/Foot   Dorsiflexion: 4+  Plantar flexion: 4+    Right Ankle/Foot   Dorsiflexion: 4+  Plantar flexion: 4+      Flowsheet Rows      Flowsheet Row Most Recent Value   PT/OT G-Codes    Current Score 53   Projected Score 67   Assessment Type Evaluation               Precautions: none      Manuals 4/29            Patellar mobs nv                                                   Neuro Re-Ed             HEP edu MW            Clams X20 ea            Sidelying hip abd X20 ea            bridge nv            DB + TAC nv            DB + TAC + marching nv            DB + TAC + fallouts  nv            Ther Ex             X-ride nv            Hip add nv            Hamstring curls nv            Leg press nv            TKE nv            3 way hip nv                                      Ther Activity                                       Gait Training                                       Modalities

## 2024-04-29 NOTE — LETTER
2024    Zuhair Lima MD  [address] [place] [id number]    Patient: Zoraida Schultz   YOB: 1970   Date of Visit: 2024     Encounter Diagnosis     ICD-10-CM    1. Chronic pain of right knee  M25.561     G89.29       2. Chronic pain of left knee  M25.562     G89.29       3. Chronic bilateral low back pain without sciatica  M54.50     G89.29           Dear Dr. Lima:    Thank you for your recent referral of Zoraida Schultz. Please review the attached evaluation summary from Zoraida's recent visit.     Please verify that you agree with the plan of care by signing the attached order.     If you have any questions or concerns, please do not hesitate to call.     I sincerely appreciate the opportunity to share in the care of one of your patients and hope to have another opportunity to work with you in the near future.       Sincerely,    Roxann Hagen, PT      Referring Provider:      I certify that I have read the below Plan of Care and certify the need for these services furnished under this plan of treatment while under my care.                    Zuhair Lima MD  [address] [facility] [address]  Via Fax: [phone]          PT Evaluation     Today's date: 2024  Patient name: Zoraida Schultz  : 1970  MRN: [id number]  Referring provider: Roxann Hagen, FERMIN  Dx:   Encounter Diagnosis     ICD-10-CM    1. Chronic pain of right knee  M25.561     G89.29       2. Chronic pain of left knee  M25.562     G89.29       3. Chronic bilateral low back pain without sciatica  M54.50     G89.29           Start Time: 0800  Stop Time: 0900  Total time in clinic (min): 60 minutes    Assessment  Assessment details: Patient is a 55 yo female presenting to her direct access physical therapy evaluation with symptoms consistent with (B) knee pain R > L that started about 6 weeks ago. Patient presents with decreased hip and knee strength, fair posture and  decreased endurance. Patient has increased difficulty with steps, walking and kneeling. PT will address the noted impairments by performing hip and knee strengthening, stretching, balance, functional activities and manual techniques to allow the patient to return to her PLOF. PT recommended 2x/week for 4-6 weeks c a good prognosis 2* PLOF.    Impairments: abnormal or restricted ROM, activity intolerance, impaired balance, impaired physical strength, lacks appropriate home exercise program, pain with function, poor posture  and poor body mechanics    Goals  STG: In four weeks the patient will:    1. Be (I) with her HEP.  2. Increase hip and knee strength to 4+/5 MMT score to assist c ADLs.  3. Improve patellar mobility to assist c steps.       LTG: In six weeks, the patient will:    1. Increase FOTO score to 67 to demonstrate improvements in symptoms and function.  2. Demonstrate full R knee AROM without pain.  3. Amb 1-2 miles without pain.   4. Increase hip and knee strength to 5/5 MMT score to assist c prolonged activities.  5. Amb 4 miles with < 2/10 pain  6. Negotiate a flight of steps without pain.        Plan  Patient would benefit from: skilled physical therapy and PT eval  Planned modality interventions: cryotherapy and thermotherapy: hydrocollator packs  Planned therapy interventions: abdominal trunk stabilization, IASTM, joint mobilization, kinesiology taping, manual therapy, massage, Jack taping, balance, body mechanics training, breathing training, neuromuscular re-education, patient education, postural training, strengthening, stretching, therapeutic activities, therapeutic exercise, transfer training, home exercise program, gait training, functional ROM exercises and flexibility  Frequency: 2x week  Duration in visits: 12  Duration in weeks: 6  Plan of Care beginning date: 4/29/2024  Plan of Care expiration date: 6/10/2024  Treatment plan discussed with: patient        Subjective  "Evaluation    History of Present Illness  Mechanism of injury: Patient noted about 6 weeks ago she started to notice R knee pain. Patient noted that tried some exercises, went to [facility] and had a steroid injection. And then the left knee started to increase in pain. Patient noted increased pain with the steps descending < ascending and walking fast. Patient noted that the steriod injection helped, however it did not complete take the pain away. Patient noted that she had an ultrasound on her knee by a physician that is potentially performing back surgery on her and they noted that she had a R meniscal tear. Patient noted a hx of PRP to her L plantar fascia due to pain in 2024 and was NWB for 8 days. Patient noted that she might have back surgery due to pain for over 4 years. Patient noted in general she has a tough time kneeling.     Patient Goals  Patient goals for therapy: increased strength, independence with ADLs/IADLs, return to sport/leisure activities, increased motion, improved balance and decreased pain  Patient goal: \"to walk 4 miles without pain.\" \"to negotiate steps without pain.\"  Pain  Current pain ratin  At best pain ratin  At worst pain ratin  Location: R > L knee; R quad  Quality: dull ache and knife-like  Relieving factors: ice and rest (ice 3x/day; tried ibuprophen, however is not taking currently)  Aggravating factors: walking, standing and stair climbing  Progression: worsening    Social Support  Steps to enter house: yes  Stairs in house: yes   Lives in: multiple-level home  Lives with: spouse and adult children    Employment status: not working  Treatments  Previous treatment: injection treatment        Objective     Postural Observations  Seated posture: fair  Standing posture: fair      Tenderness   Left Knee   Tenderness in the pes anserinus.     Right Knee   Tenderness in the MCL (distal), MCL (proximal), medial joint line and pes anserinus.     Mobility   Patellar Mobility: "   Left Knee   WFL: superior and inferior.   Hypomobile: left medial and left lateral    Right Knee   Hypomobile: medial, lateral, superior and inferior   Tibiofemoral Mobility:   Left knee   Tibiofemoral tendons within functional limits include the anterior  Right knee Hypomobile in the anterior tibiofemoral tendon(s).     Strength/Myotome Testing     Left Hip   Planes of Motion   Flexion: 4  Extension: 4-  Abduction: 4-  Adduction: 4    Right Hip   Planes of Motion   Flexion: 4  Extension: 4-  Abduction: 4-  Adduction: 4    Left Knee   Flexion: 4  Extension: 4    Right Knee   Flexion: 4-  Extension: 4-  Quadriceps contraction: fair    Left Ankle/Foot   Dorsiflexion: 4+  Plantar flexion: 4+    Right Ankle/Foot   Dorsiflexion: 4+  Plantar flexion: 4+      Flowsheet Rows      Flowsheet Row Most Recent Value   PT/OT G-Codes    Current Score 53   Projected Score 67   Assessment Type Evaluation               Precautions: none      Manuals 4/29            Patellar mobs nv                                                   Neuro Re-Ed             HEP edu MW            Clams X20 ea            Sidelying hip abd X20 ea            bridge nv            DB + TAC nv            DB + TAC + marching nv            DB + TAC + fallouts nv            Ther Ex             X-ride nv            Hip add nv            Hamstring curls nv            Leg press nv            TKE nv            3 way hip nv                                      Ther Activity                                       Gait Training                                       Modalities

## 2024-05-02 ENCOUNTER — OFFICE VISIT (OUTPATIENT)
Age: 54
End: 2024-05-02
Payer: COMMERCIAL

## 2024-05-02 DIAGNOSIS — G89.29 CHRONIC PAIN OF RIGHT KNEE: Primary | ICD-10-CM

## 2024-05-02 DIAGNOSIS — M25.562 CHRONIC PAIN OF LEFT KNEE: ICD-10-CM

## 2024-05-02 DIAGNOSIS — G89.29 CHRONIC BILATERAL LOW BACK PAIN WITHOUT SCIATICA: ICD-10-CM

## 2024-05-02 DIAGNOSIS — G89.29 CHRONIC PAIN OF LEFT KNEE: ICD-10-CM

## 2024-05-02 DIAGNOSIS — M54.50 CHRONIC BILATERAL LOW BACK PAIN WITHOUT SCIATICA: ICD-10-CM

## 2024-05-02 DIAGNOSIS — M25.561 CHRONIC PAIN OF RIGHT KNEE: Primary | ICD-10-CM

## 2024-05-02 PROCEDURE — 97112 NEUROMUSCULAR REEDUCATION: CPT | Performed by: PHYSICAL THERAPIST

## 2024-05-02 PROCEDURE — 97110 THERAPEUTIC EXERCISES: CPT | Performed by: PHYSICAL THERAPIST

## 2024-05-02 NOTE — PROGRESS NOTES
"Daily Note     Today's date: 2024  Patient name: Zoraida Schultz  : 1970  MRN: [id number]  Referring provider: Roxann Hagen, FERMIN  Dx:   Encounter Diagnosis     ICD-10-CM    1. Chronic pain of right knee  M25.561     G89.29       2. Chronic pain of left knee  M25.562     G89.29       3. Chronic bilateral low back pain without sciatica  M54.50     G89.29           Start Time: 915  Stop Time: 945  Total time in clinic (min): 30 minutes    Subjective: Patient noted that she feels muscle soreness/muscle burning with the sidelying hip abd and clams. Patient noted that she is completing her HEP.      Objective: See treatment diary below      Assessment: Patient performed X-ride  aerobic exercise to increase blood flow to the area being treated, prepare the muscles for strength training and stretching, improve overall tolerance to activity, and aerobic endurance. Patient performed various strengthening and stretching exercises with min Vcs for form. Patient noted muscle fatigue during the session. Patient noted muscle tightness during piriformis stretch. PT educated the patient on her HEP. Patient would benefit from continued PT to allow the patient to return to her PLOF.         Plan: Continue per plan of care.      Precautions: none      Manuals  52           Patellar mobs nv                                                   Neuro Re-Ed             HEP edu MW MW           Clams X20 ea X20 ea           Sidelying hip abd X20 ea X20 ea           bridge nv x20           DB + TAC nv            DB + TAC + marching nv            DB + TAC + fallouts nv            Ther Ex             X-ride nv 5'/5'           Hip add nv X20  5\" hold           Hamstring curls nv            Leg press nv            TKE nv            3 way hip nv            SLR  X20 ea           Butterfly stretch  3x30\"           Piriformis stretch  3x30\" ea           Ther Activity                                       Gait Training                   "                     Modalities

## 2024-05-06 ENCOUNTER — OFFICE VISIT (OUTPATIENT)
Age: 54
End: 2024-05-06
Payer: COMMERCIAL

## 2024-05-06 DIAGNOSIS — G89.29 CHRONIC PAIN OF RIGHT KNEE: Primary | ICD-10-CM

## 2024-05-06 DIAGNOSIS — M25.561 CHRONIC PAIN OF RIGHT KNEE: Primary | ICD-10-CM

## 2024-05-06 DIAGNOSIS — G89.29 CHRONIC PAIN OF LEFT KNEE: ICD-10-CM

## 2024-05-06 DIAGNOSIS — G89.29 CHRONIC BILATERAL LOW BACK PAIN WITHOUT SCIATICA: ICD-10-CM

## 2024-05-06 DIAGNOSIS — M25.562 CHRONIC PAIN OF LEFT KNEE: ICD-10-CM

## 2024-05-06 DIAGNOSIS — M54.50 CHRONIC BILATERAL LOW BACK PAIN WITHOUT SCIATICA: ICD-10-CM

## 2024-05-06 PROCEDURE — 97112 NEUROMUSCULAR REEDUCATION: CPT | Performed by: PHYSICAL THERAPIST

## 2024-05-06 PROCEDURE — 97110 THERAPEUTIC EXERCISES: CPT | Performed by: PHYSICAL THERAPIST

## 2024-05-06 NOTE — PROGRESS NOTES
"Daily Note     Today's date: 2024  Patient name: Zoraida Schultz  : 1970  MRN: [id number]  Referring provider: Roxann Hagen, FERMIN  Dx:   Encounter Diagnosis     ICD-10-CM    1. Chronic pain of right knee  M25.561     G89.29       2. Chronic pain of left knee  M25.562     G89.29       3. Chronic bilateral low back pain without sciatica  M54.50     G89.29           Start Time: 1045  Stop Time: 1130  Total time in clinic (min): 45 minutes    Subjective: Patient noted some back pain after last session. Patient noted that her knees are feeling better and feels less pain on the stairs.       Objective: See treatment diary below      Assessment: Patient performed X-ride  aerobic exercise to increase blood flow to the area being treated, prepare the muscles for strength training and stretching, improve overall tolerance to activity, and aerobic endurance. PT held bridges due to pain after last session. Patient performed glute sets to assist c glute strength. Patient noted no increased pain. PT introduced shoulder strengthening exercises with min VCS for form. Patient noted fatigue post session. PT educated the patient on her HEP. Patient would benefit from continued PT to allow the patient to return to her PLOF.         Plan: Continue per plan of care.      Precautions: none      Manuals  5 5/6          Patellar mobs nv                                                   Neuro Re-Ed             HEP edu MW MW MW          Clams X20 ea X20 ea X20 ea          Sidelying hip abd X20 ea X20 ea X20 ea          bridge nv x20 hold          Glute set   X20  5\" hold          Shoulder extensions (palm up and down), rows, ER   Green COB  X20 ea          DB + TAC nv            DB + TAC + marching nv            DB + TAC + fallouts nv            Ther Ex             X-ride nv 5'/5' 5'/5'          Hip add nv X20  5\" hold X20  5\" hold          Hamstring curls nv            Leg press nv            TKE nv            3 way hip nv    " "        SLR  X20 ea X20 ea          Butterfly stretch  3x30\" 3x30\"          Piriformis stretch  3x30\" ea 3x30\" ea          Ther Activity                                       Gait Training                                       Modalities                                              "

## 2024-05-09 ENCOUNTER — OFFICE VISIT (OUTPATIENT)
Age: 54
End: 2024-05-09
Payer: COMMERCIAL

## 2024-05-09 DIAGNOSIS — G89.29 CHRONIC PAIN OF RIGHT KNEE: Primary | ICD-10-CM

## 2024-05-09 DIAGNOSIS — M25.561 CHRONIC PAIN OF RIGHT KNEE: Primary | ICD-10-CM

## 2024-05-09 DIAGNOSIS — M25.562 CHRONIC PAIN OF LEFT KNEE: ICD-10-CM

## 2024-05-09 DIAGNOSIS — G89.29 CHRONIC BILATERAL LOW BACK PAIN WITHOUT SCIATICA: ICD-10-CM

## 2024-05-09 DIAGNOSIS — M54.50 CHRONIC BILATERAL LOW BACK PAIN WITHOUT SCIATICA: ICD-10-CM

## 2024-05-09 DIAGNOSIS — G89.29 CHRONIC PAIN OF LEFT KNEE: ICD-10-CM

## 2024-05-09 PROCEDURE — 97110 THERAPEUTIC EXERCISES: CPT | Performed by: PHYSICAL THERAPIST

## 2024-05-09 PROCEDURE — 97112 NEUROMUSCULAR REEDUCATION: CPT | Performed by: PHYSICAL THERAPIST

## 2024-05-09 NOTE — PROGRESS NOTES
"Daily Note     Today's date: 2024  Patient name: Zoraida Schultz  : 1970  MRN: [id number]  Referring provider: Roxann Hagen, FERMIN  Dx:   Encounter Diagnosis     ICD-10-CM    1. Chronic pain of right knee  M25.561     G89.29       2. Chronic pain of left knee  M25.562     G89.29       3. Chronic bilateral low back pain without sciatica  M54.50     G89.29           Start Time: 1030  Stop Time: 1115  Total time in clinic (min): 45 minutes    Subjective: Patient noted that she is feeling better. Patient noted that she is feeling less muscle fatigue with her HEP.      Objective: See treatment diary below      Assessment: Patient performed X-ride  aerobic exercise to increase blood flow to the area being treated, prepare the muscles for strength training and stretching, improve overall tolerance to activity, and aerobic endurance. PT introduced the leg press to assist c strength with functional activities. Patient performed shoulder strengthening exercises with min Vcs for form. Patient asked about using her elliptical at home and noted some pain when she rode it two days in a row. PT educated the patient on picking up her heels while riding and perform the elliptical every other day. Patient would benefit from continued PT to allow the patient to return to her PLOF.         Plan: Continue per plan of care.      Precautions: none      Manuals          Patellar mobs nv                                                   Neuro Re-Ed             HEP edu MW MW MW MW         Clams X20 ea X20 ea X20 ea X20 ea         Sidelying hip abd X20 ea X20 ea X20 ea X20 ea         bridge nv x20 hold          Glute set   X20  5\" hold          Shoulder extensions (palm up and down), rows, ER   Green COB  X20 ea Green COB  X20 ea         DB + TAC nv            DB + TAC + marching nv            DB + TAC + fallouts nv            Ther Ex             X-ride nv 5'/5' 5'/5' 5'/5'         Hip add nv X20  5\" hold X20  5\" hold " "X20  5\" hold         Hamstring curls nv            Leg press nv   Plate 7  DL  55#  X30  SL  35#  X30 ea         TKE nv            3 way hip nv            SLR  X20 ea X20 ea X20 ea         Butterfly stretch  3x30\" 3x30\" 3x30\"         Piriformis stretch  3x30\" ea 3x30\" ea 3x30\"ea         Ther Activity                                       Gait Training                                       Modalities                                                "

## 2024-05-13 ENCOUNTER — OFFICE VISIT (OUTPATIENT)
Age: 54
End: 2024-05-13
Payer: COMMERCIAL

## 2024-05-13 DIAGNOSIS — G89.29 CHRONIC PAIN OF LEFT KNEE: ICD-10-CM

## 2024-05-13 DIAGNOSIS — G89.29 CHRONIC PAIN OF RIGHT KNEE: Primary | ICD-10-CM

## 2024-05-13 DIAGNOSIS — M25.561 CHRONIC PAIN OF RIGHT KNEE: Primary | ICD-10-CM

## 2024-05-13 DIAGNOSIS — M54.50 CHRONIC BILATERAL LOW BACK PAIN WITHOUT SCIATICA: ICD-10-CM

## 2024-05-13 DIAGNOSIS — G89.29 CHRONIC BILATERAL LOW BACK PAIN WITHOUT SCIATICA: ICD-10-CM

## 2024-05-13 DIAGNOSIS — M25.562 CHRONIC PAIN OF LEFT KNEE: ICD-10-CM

## 2024-05-13 PROCEDURE — 97112 NEUROMUSCULAR REEDUCATION: CPT | Performed by: PHYSICAL THERAPIST

## 2024-05-13 PROCEDURE — 97110 THERAPEUTIC EXERCISES: CPT | Performed by: PHYSICAL THERAPIST

## 2024-05-13 NOTE — PROGRESS NOTES
"Daily Note     Today's date: 2024  Patient name: Zoraida Schultz  : 1970  MRN: [id number]  Referring provider: Roxann Hagen, FERMIN  Dx:   Encounter Diagnosis     ICD-10-CM    1. Chronic pain of right knee  M25.561     G89.29       2. Chronic pain of left knee  M25.562     G89.29       3. Chronic bilateral low back pain without sciatica  M54.50     G89.29           Start Time: 1430  Stop Time: 1515  Total time in clinic (min): 45 minutes    Subjective: Patient noted that she is feeling good.      Objective: See treatment diary below      Assessment: Patient performed X-ride  aerobic exercise to increase blood flow to the area being treated, prepare the muscles for strength training and stretching, improve overall tolerance to activity, and aerobic endurance. PT introduced step ups to assist c strengthening and functional activities. Patient performed with no increase in pain, however noted fatigue. Patient required min VCS for form with pall off press. PT educated the patient on her HEP. Patient would benefit from continued PT to allow the patient to return to her PLOF.         Plan: Continue per plan of care.      Precautions: none      Manuals  5/ 5/ 5        Patellar mobs nv                                                   Neuro Re-Ed             HEP edu MW MW MW MW MW        Clams X20 ea X20 ea X20 ea X20 ea         Sidelying hip abd X20 ea X20 ea X20 ea X20 ea         bridge nv x20 hold          Glute set   X20  5\" hold          Forward and lateral step ups     6\"  X20 ea (B)        Shoulder extensions (palm up and down), rows, ER   Green COB  X20 ea Green COB  X20 ea Green COB  X20 ea        Pall - off Press     Green COB  X20 ea single        DB + TAC nv            DB + TAC + marching nv            DB + TAC + fallouts nv            Ther Ex             X-ride nv 5'/5' 5'/5' 5'/5' 5'/5'        Hip add nv X20  5\" hold X20  5\" hold X20  5\" hold         Hamstring curls nv            Leg " "press nv   Plate 7  DL  55#  X30  SL  35#  X30 ea Plate 7  DL  55#  X30  SL  35#  X30 ea        TKE nv            3 way hip nv    Green foam  X20 ea (B)        SLR  X20 ea X20 ea X20 ea         Butterfly stretch  3x30\" 3x30\" 3x30\"         Piriformis stretch  3x30\" ea 3x30\" ea 3x30\"ea         Ther Activity                                       Gait Training                                       Modalities                                                  "

## 2024-05-15 ENCOUNTER — OFFICE VISIT (OUTPATIENT)
Dept: GYNECOLOGY | Facility: CLINIC | Age: 54
End: 2024-05-15
Payer: COMMERCIAL

## 2024-05-15 ENCOUNTER — OFFICE VISIT (OUTPATIENT)
Age: 54
End: 2024-05-15
Payer: COMMERCIAL

## 2024-05-15 VITALS
DIASTOLIC BLOOD PRESSURE: 80 MMHG | HEART RATE: 95 BPM | OXYGEN SATURATION: 98 % | SYSTOLIC BLOOD PRESSURE: 140 MMHG | HEIGHT: 65 IN | BODY MASS INDEX: 29.16 KG/M2 | WEIGHT: 175 LBS

## 2024-05-15 DIAGNOSIS — M54.50 CHRONIC BILATERAL LOW BACK PAIN WITHOUT SCIATICA: ICD-10-CM

## 2024-05-15 DIAGNOSIS — M25.561 CHRONIC PAIN OF RIGHT KNEE: Primary | ICD-10-CM

## 2024-05-15 DIAGNOSIS — Z01.419 ENCOUNTER FOR GYNECOLOGICAL EXAMINATION WITHOUT ABNORMAL FINDING: Primary | ICD-10-CM

## 2024-05-15 DIAGNOSIS — M25.562 CHRONIC PAIN OF LEFT KNEE: ICD-10-CM

## 2024-05-15 DIAGNOSIS — Z13.820 SCREENING FOR OSTEOPOROSIS: ICD-10-CM

## 2024-05-15 DIAGNOSIS — Z79.890 HORMONE REPLACEMENT THERAPY (HRT): ICD-10-CM

## 2024-05-15 DIAGNOSIS — G89.29 CHRONIC PAIN OF LEFT KNEE: ICD-10-CM

## 2024-05-15 DIAGNOSIS — G89.29 CHRONIC BILATERAL LOW BACK PAIN WITHOUT SCIATICA: ICD-10-CM

## 2024-05-15 DIAGNOSIS — G89.29 CHRONIC PAIN OF RIGHT KNEE: Primary | ICD-10-CM

## 2024-05-15 DIAGNOSIS — Z12.4 SCREENING FOR CERVICAL CANCER: ICD-10-CM

## 2024-05-15 PROCEDURE — 87624 HPV HI-RISK TYP POOLED RSLT: CPT | Performed by: OBSTETRICS & GYNECOLOGY

## 2024-05-15 PROCEDURE — G0124 SCREEN C/V THIN LAYER BY MD: HCPCS | Performed by: PATHOLOGY

## 2024-05-15 PROCEDURE — 76977 US BONE DENSITY MEASURE: CPT | Performed by: OBSTETRICS & GYNECOLOGY

## 2024-05-15 PROCEDURE — S0612 ANNUAL GYNECOLOGICAL EXAMINA: HCPCS | Performed by: OBSTETRICS & GYNECOLOGY

## 2024-05-15 PROCEDURE — G0145 SCR C/V CYTO,THINLAYER,RESCR: HCPCS | Performed by: PATHOLOGY

## 2024-05-15 PROCEDURE — 97110 THERAPEUTIC EXERCISES: CPT | Performed by: PHYSICAL THERAPIST

## 2024-05-15 PROCEDURE — 97112 NEUROMUSCULAR REEDUCATION: CPT | Performed by: PHYSICAL THERAPIST

## 2024-05-15 NOTE — PROGRESS NOTES
"Daily Note     Today's date: 5/15/2024  Patient name: Zoraida Schultz  : 1970  MRN: [id number]  Referring provider: Roxann Hagen, FERMIN  Dx:   Encounter Diagnosis     ICD-10-CM    1. Chronic pain of right knee  M25.561     G89.29       2. Chronic pain of left knee  M25.562     G89.29       3. Chronic bilateral low back pain without sciatica  M54.50     G89.29           Start Time: 1045  Stop Time: 1120  Total time in clinic (min): 35 minutes    Subjective: Patient noted that her knees feel stable. Patient noted that she moved wrong and pulled a muscle between her shoulder blades.       Objective: See treatment diary below      Assessment: Patient performed X-ride  aerobic exercise to increase blood flow to the area being treated, prepare the muscles for strength training and stretching, improve overall tolerance to activity, and aerobic endurance. Patient performed exercises with improved form 2* improved strength. Patient noted no increased pain during the session. PT educated the patient on her HEP. Patient would benefit from continued PT to allow the patient to return to her PLOF.         Plan: Continue per plan of care.      Precautions: none      Manuals  5/2 5/6 5/9 5/13 5/15       Patellar mobs nv                                                   Neuro Re-Ed             HEP edu MW MW MW MW MW MW       Clams X20 ea X20 ea X20 ea X20 ea         Sidelying hip abd X20 ea X20 ea X20 ea X20 ea         bridge nv x20 hold          Glute set   X20  5\" hold          Forward and lateral step ups     6\"  X20 ea (B) 8\"  X20 ea (B)       Shoulder extensions (palm up and down), rows, ER   Green COB  X20 ea Green COB  X20 ea Green COB  X20 ea Green COB  X20 ea       Pall - off Press     Green COB  X20 ea single        DB + TAC nv            DB + TAC + marching nv            DB + TAC + fallouts nv            Ther Ex             X-ride nv 5'/5' 5'/5' 5'/5' 5'/5' 5'/5'       Hip add nv X20  5\" hold X20  5\" hold X20  5\" " "hold         Hamstring curls nv            Leg press nv   Plate 7  DL  55#  X30  SL  35#  X30 ea Plate 7  DL  55#  X30  SL  35#  X30 ea Plate 7  DL  65#  X30  SL  45#  X30 ea       TKE nv            3 way hip nv    Green foam  X20 ea (B) Green foam  X20 ea (B)       SLR  X20 ea X20 ea X20 ea         Butterfly stretch  3x30\" 3x30\" 3x30\"         Piriformis stretch  3x30\" ea 3x30\" ea 3x30\"ea         Ther Activity                                       Gait Training                                       Modalities                                                    "

## 2024-05-15 NOTE — PROGRESS NOTES
"Ambulatory Visit  Name: Zoraida Schultz      : 1970      MRN: [id number]  Encounter Provider: Pete Flores DO  Encounter Date: 5/15/2024   Encounter department: [facility]    Assessment & Plan   1. Encounter for gynecological examination without abnormal finding  2. Hormone replacement therapy (HRT)  3. Screening for osteoporosis    Peripheral scan: -0.2    History of Present Illness     Zoraida Schultz is a 54 y.o. female who presents patient presents to the office for annual examination.  She offers no complaints.  She is presently on hormone replacement therapy.  This was previously managed by Dr. Benjamin.  Patient now sees Dr. Leena Jordan.  Patient is doing well on HRT and would like to continue.  She is well aware of risks.  She denies any vaginal irritation, burning, discharge or bleeding.  Denies any dysuria, hematuria urgency or urinary incontinence.  No GI complaints.    Colonoscopy 2021.  Normal.  Repeat in 10 years    Osteoporosis screening via peripheral scan 2021.  0.3    Review of Systems   Constitutional: Negative.    HENT:  Negative for sore throat and trouble swallowing.    Gastrointestinal: Negative.    Genitourinary: Negative.        Objective     /80   Pulse 95   Ht 5' 5\" (1.651 m)   Wt 79.4 kg (175 lb)   LMP 2022 Comment: 22  SpO2 98%   BMI 29.12 kg/m²     Physical Exam  Vitals reviewed.   Constitutional:       Appearance: Normal appearance.   Cardiovascular:      Rate and Rhythm: Normal rate and regular rhythm.      Pulses: Normal pulses.      Heart sounds: Normal heart sounds. No murmur heard.  Pulmonary:      Effort: Pulmonary effort is normal. No respiratory distress.      Breath sounds: Normal breath sounds.   Chest:   Breasts:     Right: No swelling, bleeding, inverted nipple, mass, nipple discharge, skin change or tenderness.      Left: No swelling, bleeding, inverted nipple, mass, nipple " discharge, skin change or tenderness.   Abdominal:      General: There is no distension.      Palpations: Abdomen is soft. There is no mass.      Tenderness: There is no abdominal tenderness. There is no guarding or rebound.      Hernia: No hernia is present. There is no hernia in the left inguinal area or right inguinal area.   Genitourinary:     General: Normal vulva.      Labia:         Right: No rash, tenderness or lesion.         Left: No rash, tenderness or lesion.       Vagina: Normal.      Cervix: Normal.      Uterus: Normal.       Adnexa:         Right: No mass, tenderness or fullness.          Left: No mass, tenderness or fullness.     Musculoskeletal:      Cervical back: Normal range of motion and neck supple. No tenderness.   Lymphadenopathy:      Cervical: No cervical adenopathy.      Upper Body:      Right upper body: No supraclavicular, axillary or pectoral adenopathy.      Left upper body: No supraclavicular, axillary or pectoral adenopathy.      Lower Body: No right inguinal adenopathy. No left inguinal adenopathy.   Neurological:      Mental Status: She is alert.       Administrative Statements

## 2024-05-22 ENCOUNTER — OFFICE VISIT (OUTPATIENT)
Age: 54
End: 2024-05-22
Payer: COMMERCIAL

## 2024-05-22 DIAGNOSIS — G89.29 CHRONIC BILATERAL LOW BACK PAIN WITHOUT SCIATICA: ICD-10-CM

## 2024-05-22 DIAGNOSIS — G89.29 CHRONIC PAIN OF RIGHT KNEE: Primary | ICD-10-CM

## 2024-05-22 DIAGNOSIS — M54.50 CHRONIC BILATERAL LOW BACK PAIN WITHOUT SCIATICA: ICD-10-CM

## 2024-05-22 DIAGNOSIS — G89.29 CHRONIC PAIN OF LEFT KNEE: ICD-10-CM

## 2024-05-22 DIAGNOSIS — M25.561 CHRONIC PAIN OF RIGHT KNEE: Primary | ICD-10-CM

## 2024-05-22 DIAGNOSIS — M25.562 CHRONIC PAIN OF LEFT KNEE: ICD-10-CM

## 2024-05-22 LAB
LAB AP GYN PRIMARY INTERPRETATION: ABNORMAL
Lab: ABNORMAL
PATH INTERP SPEC-IMP: ABNORMAL

## 2024-05-22 PROCEDURE — 97110 THERAPEUTIC EXERCISES: CPT | Performed by: PHYSICAL THERAPIST

## 2024-05-22 PROCEDURE — 97112 NEUROMUSCULAR REEDUCATION: CPT | Performed by: PHYSICAL THERAPIST

## 2024-05-22 NOTE — PROGRESS NOTES
"Daily Note     Today's date: 2024  Patient name: Zoraida Schultz  : 1970  MRN: [id number]  Referring provider: Roxann Hagen, FERMIN  Dx:   Encounter Diagnosis     ICD-10-CM    1. Chronic pain of right knee  M25.561     G89.29       2. Chronic pain of left knee  M25.562     G89.29       3. Chronic bilateral low back pain without sciatica  M54.50     G89.29           Start Time: 08  Stop Time: 920  Total time in clinic (min): 35 minutes    Subjective: Patient noted that she is feeling stronger, however she has pain in the knees and back.       Objective: See treatment diary below      Assessment: Patient performed X-ride  aerobic exercise to increase blood flow to the area being treated, prepare the muscles for strength training and stretching, improve overall tolerance to activity, and aerobic endurance. Patient performed exercises with min Vcs for form. Patient continues to improve with strength 2* increased activities at home with less pain. PT educated the patient on her HEP. Patient would benefit from continued PT to allow the patient to return to her PLOF.         Plan: Continue per plan of care.      Precautions: none      Manuals  5/2 5/6 5/9 5/13 5/15 5/22      Patellar mobs nv                                                   Neuro Re-Ed             HEP edu MW MW MW MW MW MW MW      Clams X20 ea X20 ea X20 ea X20 ea   X20 ea      Sidelying hip abd X20 ea X20 ea X20 ea X20 ea   X20 ea      bridge nv x20 hold          Glute set   X20  5\" hold          Forward and lateral step ups     6\"  X20 ea (B) 8\"  X20 ea (B) 8\"  X20 ea (B)      Shoulder extensions (palm up and down), rows, ER   Green COB  X20 ea Green COB  X20 ea Green COB  X20 ea Green COB  X20 ea       Pall - off Press     Green COB  X20 ea single        DB + TAC nv            DB + TAC + marching nv            DB + TAC + fallouts nv            Ther Ex             X-ride nv 5'/5' 5'/5' 5'/5' 5'/5' 5'/5' 5'/5'      Hip add nv X20  5\" hold " "X20  5\" hold X20  5\" hold         Hamstring curls nv            Leg press nv   Plate 7  DL  55#  X30  SL  35#  X30 ea Plate 7  DL  55#  X30  SL  35#  X30 ea Plate 7  DL  65#  X30  SL  45#  X30 ea Plate 7  DL  65#  X30  SL  45#  X30 ea      TKE nv            3 way hip nv    Green foam  X20 ea (B) Green foam  X20 ea (B) Green foam  X20 ea (B)      SLR  X20 ea X20 ea X20 ea   X20 ea      Butterfly stretch  3x30\" 3x30\" 3x30\"   3x30\"       Piriformis stretch  3x30\" ea 3x30\" ea 3x30\"ea   3x30\" ea      Ther Activity                                       Gait Training                                       Modalities                                                      "

## 2024-05-23 LAB
HPV HR 12 DNA CVX QL NAA+PROBE: NEGATIVE
HPV16 DNA CVX QL NAA+PROBE: NEGATIVE
HPV18 DNA CVX QL NAA+PROBE: NEGATIVE

## 2024-05-24 ENCOUNTER — TELEPHONE (OUTPATIENT)
Dept: GYNECOLOGY | Facility: CLINIC | Age: 54
End: 2024-05-24

## 2024-05-24 NOTE — TELEPHONE ENCOUNTER
----- Message from Pete Flores DO sent at 5/24/2024  7:26 AM EDT -----  Pap with ASCUS but negative HPV. Repap 1 year

## 2024-06-03 ENCOUNTER — OFFICE VISIT (OUTPATIENT)
Age: 54
End: 2024-06-03
Payer: COMMERCIAL

## 2024-06-03 DIAGNOSIS — G89.29 CHRONIC PAIN OF LEFT KNEE: ICD-10-CM

## 2024-06-03 DIAGNOSIS — M54.50 CHRONIC BILATERAL LOW BACK PAIN WITHOUT SCIATICA: ICD-10-CM

## 2024-06-03 DIAGNOSIS — G89.29 CHRONIC PAIN OF RIGHT KNEE: Primary | ICD-10-CM

## 2024-06-03 DIAGNOSIS — M25.561 CHRONIC PAIN OF RIGHT KNEE: Primary | ICD-10-CM

## 2024-06-03 DIAGNOSIS — M25.562 CHRONIC PAIN OF LEFT KNEE: ICD-10-CM

## 2024-06-03 DIAGNOSIS — G89.29 CHRONIC BILATERAL LOW BACK PAIN WITHOUT SCIATICA: ICD-10-CM

## 2024-06-03 PROCEDURE — 97110 THERAPEUTIC EXERCISES: CPT | Performed by: PHYSICAL THERAPIST

## 2024-06-03 PROCEDURE — 97112 NEUROMUSCULAR REEDUCATION: CPT | Performed by: PHYSICAL THERAPIST

## 2024-06-03 NOTE — PROGRESS NOTES
"Daily Note     Today's date: 6/3/2024  Patient name: Zoraida Schultz  : 1970  MRN: [id number]  Referring provider: Roxann Hagen, FERMIN  Dx:   Encounter Diagnosis     ICD-10-CM    1. Chronic pain of right knee  M25.561     G89.29       2. Chronic pain of left knee  M25.562     G89.29       3. Chronic bilateral low back pain without sciatica  M54.50     G89.29           Start Time: 1215  Stop Time: 1300  Total time in clinic (min): 45 minutes    Subjective: Patient noted that she is now able to descend the steps in a reciprocal pattern without pain which is an improvement.       Objective: See treatment diary below      Assessment: Patient performed X-ride  aerobic exercise to increase blood flow to the area being treated, prepare the muscles for strength training and stretching, improve overall tolerance to activity, and aerobic endurance. Patient continues to improve with strength 2* less pain with steps. Patient required min VCS for lateral step ups. PT educated the patient on her HEP. Patient would benefit from continued PT to allow the patient to return to her PLOF.         Plan: Continue per plan of care.      Precautions: none      Manuals  5/2 5/6 5/9 5/13 5/15 5/22 6/3     Patellar mobs nv                                                   Neuro Re-Ed             HEP edu MW MW MW MW MW MW MW MW     Clams X20 ea X20 ea X20 ea X20 ea   X20 ea      Sidelying hip abd X20 ea X20 ea X20 ea X20 ea   X20 ea      bridge nv x20 hold          Glute set   X20  5\" hold          Forward and lateral step ups     6\"  X20 ea (B) 8\"  X20 ea (B) 8\"  X20 ea (B) 8\"  X20 ea (B)     Shoulder extensions (palm up and down), rows, ER   Green COB  X20 ea Green COB  X20 ea Green COB  X20 ea Green COB  X20 ea       Pall - off Press     Green COB  X20 ea single        DB + TAC nv            DB + TAC + marching nv            DB + TAC + fallouts nv            Ther Ex             X-ride nv 5'/5' 5'/5' 5'/5' 5'/5' 5'/5' 5'/5' 5'/5'   " "  Hip add nv X20  5\" hold X20  5\" hold X20  5\" hold         Hamstring curls nv            Leg press nv   Plate 7  DL  55#  X30  SL  35#  X30 ea Plate 7  DL  55#  X30  SL  35#  X30 ea Plate 7  DL  65#  X30  SL  45#  X30 ea Plate 7  DL  65#  X30  SL  45#  X30 ea Plate 7  DL  75#  X30  SL  45#  X30 ea     TKE nv            3 way hip nv    Green foam  X20 ea (B) Green foam  X20 ea (B) Green foam  X20 ea (B) Green foam  X20 ea (B)     SLR  X20 ea X20 ea X20 ea   X20 ea      Butterfly stretch  3x30\" 3x30\" 3x30\"   3x30\"       Piriformis stretch  3x30\" ea 3x30\" ea 3x30\"ea   3x30\" ea 3x30\" ea     Ther Activity                                       Gait Training                                       Modalities                                                        "

## 2024-06-10 ENCOUNTER — HOSPITAL ENCOUNTER (OUTPATIENT)
Dept: RADIOLOGY | Age: 54
Discharge: HOME | End: 2024-06-10
Payer: COMMERCIAL

## 2024-06-10 VITALS — BODY MASS INDEX: 29.16 KG/M2 | HEIGHT: 65 IN | WEIGHT: 175 LBS

## 2024-06-10 DIAGNOSIS — Z12.31 ENCOUNTER FOR SCREENING MAMMOGRAM FOR MALIGNANT NEOPLASM OF BREAST: ICD-10-CM

## 2024-06-10 PROCEDURE — 77063 BREAST TOMOSYNTHESIS BI: CPT

## 2024-06-10 PROCEDURE — 77067 SCR MAMMO BI INCL CAD: CPT

## 2024-06-12 ENCOUNTER — OFFICE VISIT (OUTPATIENT)
Age: 54
End: 2024-06-12
Payer: COMMERCIAL

## 2024-06-12 DIAGNOSIS — M54.50 CHRONIC BILATERAL LOW BACK PAIN WITHOUT SCIATICA: ICD-10-CM

## 2024-06-12 DIAGNOSIS — G89.29 CHRONIC PAIN OF RIGHT KNEE: Primary | ICD-10-CM

## 2024-06-12 DIAGNOSIS — G89.29 CHRONIC PAIN OF LEFT KNEE: ICD-10-CM

## 2024-06-12 DIAGNOSIS — G89.29 CHRONIC BILATERAL LOW BACK PAIN WITHOUT SCIATICA: ICD-10-CM

## 2024-06-12 DIAGNOSIS — M25.561 CHRONIC PAIN OF RIGHT KNEE: Primary | ICD-10-CM

## 2024-06-12 DIAGNOSIS — M25.562 CHRONIC PAIN OF LEFT KNEE: ICD-10-CM

## 2024-06-12 PROCEDURE — 97112 NEUROMUSCULAR REEDUCATION: CPT | Performed by: PHYSICAL THERAPIST

## 2024-06-12 PROCEDURE — 97110 THERAPEUTIC EXERCISES: CPT | Performed by: PHYSICAL THERAPIST

## 2024-06-12 NOTE — PROGRESS NOTES
"Daily Note     Today's date: 2024  Patient name: Zoraida Schultz  : 1970  MRN: [id number]  Referring provider: Roxann Hagen PT  Dx:   Encounter Diagnosis     ICD-10-CM    1. Chronic pain of right knee  M25.561     G89.29       2. Chronic pain of left knee  M25.562     G89.29       3. Chronic bilateral low back pain without sciatica  M54.50     G89.29           Start Time: 1115  Stop Time: 1200  Total time in clinic (min): 45 minutes    Subjective: Patient noted that she is still a little sore in both knees.       Objective: See treatment diary below      Assessment: Patient performed X-ride  aerobic exercise to increase blood flow to the area being treated, prepare the muscles for strength training and stretching, improve overall tolerance to activity, and aerobic endurance. Patient performed exercises with improved form which demonstrates improvements with strength. Patient noted no increased pain during the session. PT educated the patient on her HEP. Patient would benefit from continued PT to allow the patient to return to her PLOF.         Plan: Continue per plan of care.      Precautions: none      Manuals  5 5/6 5/9 5/13 5/15 5/22 6/3 6/12    Patellar mobs nv                                                   Neuro Re-Ed             HEP edu MW MW MW MW MW MW MW MW MW    Clams X20 ea X20 ea X20 ea X20 ea   X20 ea      Sidelying hip abd X20 ea X20 ea X20 ea X20 ea   X20 ea      bridge nv x20 hold          Glute set   X20  5\" hold          Forward and lateral step ups     6\"  X20 ea (B) 8\"  X20 ea (B) 8\"  X20 ea (B) 8\"  X20 ea (B) 8\"  X20 ea (B)    Shoulder extensions (palm up and down), rows, ER   Green COB  X20 ea Green COB  X20 ea Green COB  X20 ea Green COB  X20 ea   Green COB  X20 ea    Pall - off Press     Green COB  X20 ea single        DB + TAC nv            DB + TAC + marching nv            DB + TAC + fallouts nv            Ther Ex             X-ride nv 5'/5' 5'/5' 5'/5' 5'/5' 5'/5' " "5'/5' 5'/5' 5'/5'    Calf stretch         3x30\"    Hip add nv X20  5\" hold X20  5\" hold X20  5\" hold         Hamstring curls nv            Leg press nv   Plate 7  DL  55#  X30  SL  35#  X30 ea Plate 7  DL  55#  X30  SL  35#  X30 ea Plate 7  DL  65#  X30  SL  45#  X30 ea Plate 7  DL  65#  X30  SL  45#  X30 ea Plate 7  DL  75#  X30  SL  45#  X30 ea Plate 7  DL  75#  X30  SL  45#  X30 ea    TKE nv            3 way hip nv    Green foam  X20 ea (B) Green foam  X20 ea (B) Green foam  X20 ea (B) Green foam  X20 ea (B)     SLR  X20 ea X20 ea X20 ea   X20 ea      Butterfly stretch  3x30\" 3x30\" 3x30\"   3x30\"       Piriformis stretch  3x30\" ea 3x30\" ea 3x30\"ea   3x30\" ea 3x30\" ea     Ther Activity                                       Gait Training                                       Modalities                                                          "

## 2024-06-18 ENCOUNTER — OFFICE VISIT (OUTPATIENT)
Age: 54
End: 2024-06-18
Payer: COMMERCIAL

## 2024-06-18 DIAGNOSIS — M25.562 CHRONIC PAIN OF LEFT KNEE: ICD-10-CM

## 2024-06-18 DIAGNOSIS — M54.50 CHRONIC BILATERAL LOW BACK PAIN WITHOUT SCIATICA: ICD-10-CM

## 2024-06-18 DIAGNOSIS — G89.29 CHRONIC BILATERAL LOW BACK PAIN WITHOUT SCIATICA: ICD-10-CM

## 2024-06-18 DIAGNOSIS — M25.561 CHRONIC PAIN OF RIGHT KNEE: Primary | ICD-10-CM

## 2024-06-18 DIAGNOSIS — G89.29 CHRONIC PAIN OF RIGHT KNEE: Primary | ICD-10-CM

## 2024-06-18 DIAGNOSIS — G89.29 CHRONIC PAIN OF LEFT KNEE: ICD-10-CM

## 2024-06-18 PROCEDURE — 97112 NEUROMUSCULAR REEDUCATION: CPT | Performed by: PHYSICAL THERAPIST

## 2024-06-18 PROCEDURE — 97164 PT RE-EVAL EST PLAN CARE: CPT | Performed by: PHYSICAL THERAPIST

## 2024-06-18 PROCEDURE — 97110 THERAPEUTIC EXERCISES: CPT | Performed by: PHYSICAL THERAPIST

## 2024-06-18 NOTE — PROGRESS NOTES
PT Re-Evaluation  and PT Discharge    Today's date: 2024  Patient name: Zoraida Schultz  : 1970  MRN: [id number]  Referring provider: Roxann Hagen, FERMIN  Dx:   Encounter Diagnosis     ICD-10-CM    1. Chronic pain of right knee  M25.561     G89.29       2. Chronic pain of left knee  M25.562     G89.29       3. Chronic bilateral low back pain without sciatica  M54.50     G89.29           Start Time: 1615  Stop Time: 1650  Total time in clinic (min): 35 minutes    Assessment    Assessment details: Patient is a 53 yo female presenting to her physical therapy re-evaluation with symptoms consistent with (B) knee pain R > L. Since starting physical therapy the patient has improved with strength, activity tolerance and pain levels. Patient is able to negotiate steps with less pain, however she continues to have pain with fast walking. Patient also notes some pain with kneeling. Patient feels (I) with her HEP and ADLs. Due to noted improvements, the patient will be D/C from PT with a HEP. PT and patient agree with POC.           Goals  STG: In four weeks the patient will:    1. Be (I) with her HEP. (MET)  2. Increase hip and knee strength to 4+/5 MMT score to assist c ADLs. (MET)  3. Improve patellar mobility to assist c steps. (In progress)      LTG: In six weeks, the patient will:    1. Increase FOTO score to 67 to demonstrate improvements in symptoms and function. (MET)  2. Demonstrate full R knee AROM without pain. (MET)  3. Amb 1-2 miles without pain. (MET)  4. Increase hip and knee strength to 5/5 MMT score to assist c prolonged activities. (In progress)  5. Amb 4 miles with < 2/10 pain (MET)  6. Negotiate a flight of steps without pain.  (MET -90%)      Plan  Therapy options: D/C from PT due to improvements.    Frequency: 1x week  Duration in weeks: 1  Plan of Care beginning date: 6/10/2024  Plan of Care expiration date: 2024  Treatment plan discussed with: patient        Subjective  "Evaluation    History of Present Illness  Mechanism of injury: Patient noted an improved with steps since starting physical therapy. Patient noted in general she feels stronger. Patient noted a 50% improvement since starting PT. Patient noted that she avoids walking fast at times due to instability and pain. Patient is having stem cell procedure on her back in 24. Patient noted that she is now having pain in both knees at times. Patient feels (I) with her HEP and ADLs.     Patient Goals  Patient goal: \"to walk 4 miles without pain.\" (MET) \"to negotiate steps without pain.\" (90% MET)  Pain  Current pain ratin  At best pain ratin  At worst pain rating: 3  Location: R > L knee; R quad  Quality: dull ache and knife-like  Relieving factors: ice and rest (ice 3x/day; tried ibuprophen, however is not taking currently)  Aggravating factors: walking, standing and stair climbing (improving)  Progression: improved    Social Support  Steps to enter house: yes  Stairs in house: yes   Lives in: multiple-level home  Lives with: spouse and adult children    Employment status: not working  Treatments  Previous treatment: injection treatment        Objective     Postural Observations  Seated posture: fair  Standing posture: fair      Tenderness   Left Knee   Tenderness in the pes anserinus.     Right Knee   Tenderness in the MCL (distal), MCL (proximal), medial joint line and pes anserinus.     Mobility   Patellar Mobility:   Left Knee   WFL: superior and inferior.   Hypomobile: left medial and left lateral    Right Knee   Hypomobile: medial, lateral, superior and inferior   Tibiofemoral Mobility:   Left knee   Tibiofemoral tendons within functional limits include the anterior  Right knee Hypomobile in the anterior tibiofemoral tendon(s).     Strength/Myotome Testing     Left Hip   Planes of Motion   Flexion: 4+  Extension: 4+  Abduction: 4+  Adduction: 4+    Right Hip   Planes of Motion   Flexion: 4+  Extension: " "4+  Abduction: 4+  Adduction: 4+    Left Knee   Flexion: 4+  Extension: 4+    Right Knee   Flexion: 4+  Extension: 4+  Quadriceps contraction: fair    Left Ankle/Foot   Dorsiflexion: 4+  Plantar flexion: 4+    Right Ankle/Foot   Dorsiflexion: 4+  Plantar flexion: 4+               Precautions: none    Manuals 4/29 5/2 5/6 5/9 5/13 5/15 5/22 6/3 6/12 6/18   Patellar mobs nv            RE          MW                             Neuro Re-Ed             HEP edu MW MW MW MW MW MW MW MW MW MW   Clams X20 ea X20 ea X20 ea X20 ea   X20 ea      Sidelying hip abd X20 ea X20 ea X20 ea X20 ea   X20 ea      bridge nv x20 hold          Glute set   X20  5\" hold          Forward and lateral step ups     6\"  X20 ea (B) 8\"  X20 ea (B) 8\"  X20 ea (B) 8\"  X20 ea (B) 8\"  X20 ea (B)    Shoulder extensions (palm up and down), rows, ER   Green COB  X20 ea Green COB  X20 ea Green COB  X20 ea Green COB  X20 ea   Green COB  X20 ea Green COB  X20 ea   Pall - off Press     Green COB  X20 ea single        DB + TAC nv            DB + TAC + marching nv            DB + TAC + fallouts nv            Ther Ex             X-ride nv 5'/5' 5'/5' 5'/5' 5'/5' 5'/5' 5'/5' 5'/5' 5'/5' 5'/5'   Calf stretch         3x30\"    Hip add nv X20  5\" hold X20  5\" hold X20  5\" hold         Hamstring curls nv            Leg press nv   Plate 7  DL  55#  X30  SL  35#  X30 ea Plate 7  DL  55#  X30  SL  35#  X30 ea Plate 7  DL  65#  X30  SL  45#  X30 ea Plate 7  DL  65#  X30  SL  45#  X30 ea Plate 7  DL  75#  X30  SL  45#  X30 ea Plate 7  DL  75#  X30  SL  45#  X30 ea Plate 7  DL  75#  X30  SL  45#  X30 ea   TKE nv            3 way hip nv    Green foam  X20 ea (B) Green foam  X20 ea (B) Green foam  X20 ea (B) Green foam  X20 ea (B)     SLR  X20 ea X20 ea X20 ea   X20 ea      Butterfly stretch  3x30\" 3x30\" 3x30\"   3x30\"       Piriformis stretch  3x30\" ea 3x30\" ea 3x30\"ea   3x30\" ea 3x30\" ea     Ther Activity                                       Gait Training                   "                     Modalities

## 2024-08-09 ENCOUNTER — NURSE TRIAGE (OUTPATIENT)
Age: 54
End: 2024-08-09

## 2024-08-09 NOTE — TELEPHONE ENCOUNTER
"Patient called with c/o Vaginal burning, started late Monday into Tuesday AM. Noticed vaginal redness and irritation yesterday. Has urinary frequency and urgency. Has not had UTI in long time. Foul odor to urine, no vaginal discharge. Wants appointment with Dr. Veliz this AM, leaving at noon for vacation. No appts available in AM but offered 3 Pm and 3:15 PM. Patient states she usually calls office and gets right in, states she will go to urgent care.  Called , spoke with Sunshine, no add on appts available for either provider today. Unfortunately call was dropped with patient.       Answer Assessment - Initial Assessment Questions  1. SEVERITY: \"How bad is the pain?\"  (e.g., Scale 1-10; mild, moderate, or severe)    - MILD (1-3): complains slightly about urination hurting    - MODERATE (4-7): interferes with normal activities      - SEVERE (8-10): excruciating, unwilling or unable to urinate because of the pain       Back pain and pain urination, constant pain 5/10  2. FREQUENCY: \"How many times have you had painful urination today?\"       Yes, feels urgency and frequency  3. PATTERN: \"Is pain present every time you urinate or just sometimes?\"       yes  4. ONSET: \"When did the painful urination start?\"       Monday into Tuesday  5. FEVER: \"Do you have a fever?\" If Yes, ask: \"What is your temperature, how was it measured, and when did it start?\"      denies  6. PAST UTI: \"Have you had a urine infection before?\" If Yes, ask: \"When was the last time?\" and \"What happened that time?\"       Not in a long time  7. CAUSE: \"What do you think is causing the painful urination?\"  (e.g., UTI, scratch, Herpes sore)      Unsure if it UTI  8. OTHER SYMPTOMS: \"Do you have any other symptoms?\" (e.g., flank pain, vaginal discharge, genital sores, urgency, blood in urine)      Burning in vaginal area  9. PREGNANCY: \"Is there any chance you are pregnant?\" \"When was your last menstrual period?\"      meopause    Protocols " used: Urination Pain - Female-ADULT-[place]

## 2024-10-31 DIAGNOSIS — F41.9 ANXIETY: Primary | ICD-10-CM

## 2024-10-31 RX ORDER — HYDROXYZINE HYDROCHLORIDE 50 MG/1
50 TABLET, FILM COATED ORAL
Qty: 30 TABLET | Refills: 11 | Status: SHIPPED | OUTPATIENT
Start: 2024-10-31

## 2024-11-04 DIAGNOSIS — N95.1 MENOPAUSAL SYMPTOMS: Primary | ICD-10-CM

## 2024-11-05 LAB
CORTIS SERPL-MCNC: 6.3 UG/DL
DHEA-S SERPL-MCNC: 78 UG/DL (ref 8–188)
ESTRADIOL SERPL-MCNC: 17 PG/ML
PROGEST SERPL-MCNC: 4.33 NG/ML
TESTOST SERPL-MCNC: 19 NG/DL

## 2025-02-06 DIAGNOSIS — N95.1 MENOPAUSAL SYMPTOMS: ICD-10-CM

## 2025-04-02 ENCOUNTER — NURSE TRIAGE (OUTPATIENT)
Age: 55
End: 2025-04-02

## 2025-04-02 NOTE — TELEPHONE ENCOUNTER
"FOLLOW UP: Scheduled appt     REASON FOR CONVERSATION: Breast Problem    SYMPTOMS: Ongoing right breast pain     OTHER: patient with continued right breast pain, feels it with pressure or any activity that applies any pressure. Denies any other breast symptoms and was evaluated for this before.     DISPOSITION: See Within 2 Weeks in Office    Reason for Disposition   Breast pain and cause is not known    Answer Assessment - Initial Assessment Questions  1. SYMPTOM: \"What's the main symptom you're concerned about?\"  (e.g., lump, nipple discharge, pain, rash )      Breast pain   2. LOCATION: \"Where is the sx located?\"      Right breast, outer area   3. ONSET: \"When did sx  start?\"      3 months ago   4. PRIOR HISTORY: \"Do you have any history of prior problems with your breasts?\" (e.g., breast cancer, breast implant, fibrocystic breast disease)      Has had ongoing breast pain for years   5. CAUSE: \"What do you think is causing this symptom?\"      Unsure   6. OTHER SYMPTOMS: \"Do you have any other symptoms?\" (e.g., fever, breast pain, nipple discharge, redness or rash)      Denies   7. PREGNANCY-BREASTFEEDING: \"Is there any chance you are pregnant?\" \"When was your last menstrual period?\" \"Are you breastfeeding?\"      Post menopausal    Protocols used: Breast Symptoms-Adult-[place]    "

## 2025-04-03 ENCOUNTER — OFFICE VISIT (OUTPATIENT)
Dept: GYNECOLOGY | Facility: CLINIC | Age: 55
End: 2025-04-03
Payer: COMMERCIAL

## 2025-04-03 VITALS — WEIGHT: 168.6 LBS | BODY MASS INDEX: 28.06 KG/M2

## 2025-04-03 DIAGNOSIS — N64.4 BREAST PAIN: Primary | ICD-10-CM

## 2025-04-03 PROCEDURE — 99213 OFFICE O/P EST LOW 20 MIN: CPT | Performed by: OBSTETRICS & GYNECOLOGY

## 2025-04-03 RX ORDER — ATOVAQUONE 750 MG/5ML
SUSPENSION ORAL
COMMUNITY
Start: 2025-03-14

## 2025-04-03 RX ORDER — NYSTATIN 500000 [USP'U]/1
1 TABLET, COATED ORAL 2 TIMES DAILY
COMMUNITY
Start: 2025-03-25

## 2025-04-03 RX ORDER — AZITHROMYCIN 250 MG/1
1 TABLET, FILM COATED ORAL 2 TIMES DAILY
COMMUNITY
Start: 2025-03-25

## 2025-04-03 RX ORDER — THYROID,PORK 90 MG
TABLET ORAL
COMMUNITY
Start: 2025-04-01

## 2025-04-03 RX ORDER — DOXYCYCLINE 100 MG/1
1 CAPSULE ORAL 2 TIMES DAILY
COMMUNITY
Start: 2025-03-26

## 2025-04-03 NOTE — ASSESSMENT & PLAN NOTE
Orders:    Mammo diagnostic bilateral w cad; Future    US breast bilateral limited (diagnostic); Future

## 2025-04-03 NOTE — PROGRESS NOTES
:  Assessment & Plan  Breast pain    Orders:    Mammo diagnostic bilateral w cad; Future    US breast bilateral limited (diagnostic); Future        History of Present Illness     Zoraida Schultz is a 55 y.o. female   HPI patient presents to the office complaining of bilateral breast pain since January.  Any pressure to the breast primarily the right side increases the discomfort.  She denies any trauma to the breast tissue she denies any breast drainage.  Denies any fever.  She is presently on hormone replacement therapy managed by Dr. Leena Muller.  Her last mammogram was in June 2024 and was benign  Review of Systems  Objective   Wt 76.5 kg (168 lb 9.6 oz)   LMP 05/22/2022 Comment: 5/22/22  BMI 28.06 kg/m²      Physical Exam  Vitals reviewed.   Cardiovascular:      Rate and Rhythm: Normal rate and regular rhythm.      Pulses: Normal pulses.      Heart sounds: Normal heart sounds.   Pulmonary:      Effort: Pulmonary effort is normal.      Breath sounds: Normal breath sounds.   Chest:   Breasts:     Right: Tenderness present. No swelling, bleeding, inverted nipple, mass, nipple discharge or skin change.      Left: Tenderness present. No swelling, bleeding, inverted nipple, mass, nipple discharge or skin change.      Comments: Dense breast tissue bilaterally greater on the right than left  Lymphadenopathy:      Upper Body:      Right upper body: No supraclavicular, axillary or pectoral adenopathy.      Left upper body: No supraclavicular, axillary or pectoral adenopathy.   Neurological:      Mental Status: She is alert.

## 2025-05-28 ENCOUNTER — OFFICE VISIT (OUTPATIENT)
Dept: GYNECOLOGY | Facility: CLINIC | Age: 55
End: 2025-05-28
Payer: COMMERCIAL

## 2025-05-28 VITALS
HEART RATE: 81 BPM | SYSTOLIC BLOOD PRESSURE: 118 MMHG | WEIGHT: 169.4 LBS | BODY MASS INDEX: 28.22 KG/M2 | HEIGHT: 65 IN | DIASTOLIC BLOOD PRESSURE: 78 MMHG

## 2025-05-28 DIAGNOSIS — Z79.890 HORMONE REPLACEMENT THERAPY (HRT): ICD-10-CM

## 2025-05-28 DIAGNOSIS — Z01.419 ENCOUNTER FOR GYNECOLOGICAL EXAMINATION WITHOUT ABNORMAL FINDING: Primary | ICD-10-CM

## 2025-05-28 PROCEDURE — G0145 SCR C/V CYTO,THINLAYER,RESCR: HCPCS | Performed by: OBSTETRICS & GYNECOLOGY

## 2025-05-28 PROCEDURE — S0612 ANNUAL GYNECOLOGICAL EXAMINA: HCPCS | Performed by: OBSTETRICS & GYNECOLOGY

## 2025-05-28 RX ORDER — CEFUROXIME AXETIL 500 MG/1
1 TABLET ORAL 2 TIMES DAILY
COMMUNITY
Start: 2025-05-14

## 2025-05-28 NOTE — PROGRESS NOTES
"Name: Zoraida Schultz      : 1970      MRN: [id number]  Encounter Provider: Pete Flores DO  Encounter Date: 2025   Encounter department: [facility]  :  Assessment & Plan  Encounter for gynecological examination without abnormal finding         Hormone replacement therapy (HRT)  Managed by Dr. Leena Jordan           History of Present Illness   HPI  Zoraida Schultz is a 55 y.o. female who presents for annual examination.  She offers no complaints.  Denies any vaginal irritation, burning, discharge or bleeding.  Denies any dysuria, hematuria urgency urinary incontinence.  Patient is presently on HRT.  This is managed by Dr. Leena Jordan.    Osteoporosis screening via peripheral scan .  -0.2    Colonoscopy 2021.  Normal.  Repeat in 10 years.      Review of Systems   Constitutional: Negative.    HENT:  Negative for sore throat and trouble swallowing.    Gastrointestinal: Negative.    Genitourinary: Negative.           Objective   /78 (BP Location: Left arm, Patient Position: Sitting, Cuff Size: Standard)   Pulse 81   Ht 5' 5\" (1.651 m)   Wt 76.8 kg (169 lb 6.4 oz)   LMP 2022 Comment: 22  BMI 28.19 kg/m²      Physical Exam  Vitals reviewed.     Cardiovascular:      Rate and Rhythm: Normal rate and regular rhythm.      Pulses: Normal pulses.      Heart sounds: Normal heart sounds.   Pulmonary:      Effort: Pulmonary effort is normal.      Breath sounds: Normal breath sounds.   Chest:   Breasts:     Right: No swelling, bleeding, inverted nipple, mass, nipple discharge, skin change or tenderness.      Left: No swelling, bleeding, inverted nipple, mass, nipple discharge, skin change or tenderness.   Abdominal:      General: There is no distension.      Palpations: Abdomen is soft. There is no mass.      Tenderness: There is no abdominal tenderness. There is no guarding or rebound.      Hernia: No hernia is present. There " is no hernia in the left inguinal area or right inguinal area.   Genitourinary:     General: Normal vulva.      Labia:         Right: No rash, tenderness or lesion.         Left: No rash, tenderness or lesion.       Vagina: Normal.      Cervix: Normal.      Uterus: Normal.       Adnexa:         Right: No mass, tenderness or fullness.          Left: No mass, tenderness or fullness.       Musculoskeletal:      Cervical back: Normal range of motion and neck supple. No tenderness.   Lymphadenopathy:      Cervical: No cervical adenopathy.      Upper Body:      Right upper body: No supraclavicular, axillary or pectoral adenopathy.      Left upper body: No supraclavicular, axillary or pectoral adenopathy.      Lower Body: No right inguinal adenopathy. No left inguinal adenopathy.     Neurological:      Mental Status: She is alert.

## 2025-06-04 LAB
LAB AP GYN PRIMARY INTERPRETATION: NORMAL
Lab: NORMAL

## 2025-08-17 DIAGNOSIS — L90.0 LICHEN SCLEROSUS: ICD-10-CM

## 2025-08-19 RX ORDER — CLOBETASOL PROPIONATE 0.5 MG/G
CREAM TOPICAL 2 TIMES DAILY
Qty: 30 G | Refills: 0 | Status: SHIPPED | OUTPATIENT
Start: 2025-08-19